# Patient Record
Sex: MALE | Race: WHITE | NOT HISPANIC OR LATINO | Employment: OTHER | URBAN - METROPOLITAN AREA
[De-identification: names, ages, dates, MRNs, and addresses within clinical notes are randomized per-mention and may not be internally consistent; named-entity substitution may affect disease eponyms.]

---

## 2023-03-06 ENCOUNTER — TELEPHONE (OUTPATIENT)
Dept: PRIMARY CARE | Facility: CLINIC | Age: 38
End: 2023-03-06
Payer: COMMERCIAL

## 2023-03-07 NOTE — TELEPHONE ENCOUNTER
Pt would like PCP to contact GI doctor. Per patient GI doctor states they will speak with PCP if they have further questions/issues because GI has not found anything.

## 2023-03-08 DIAGNOSIS — G89.29 CHRONIC ABDOMINAL PAIN: Primary | ICD-10-CM

## 2023-03-08 DIAGNOSIS — R10.9 CHRONIC ABDOMINAL PAIN: Primary | ICD-10-CM

## 2023-03-08 NOTE — TELEPHONE ENCOUNTER
Instructions per colonoscopy visit were to fu in office with them to discuss results and tx. I still discussed with Dr Fleming and she doesn't anticipate further GI work up and at this point feels it is muscular. Pain mngt consult would be next step.

## 2023-04-11 DIAGNOSIS — L65.9 ALOPECIA: Primary | ICD-10-CM

## 2023-04-11 RX ORDER — FINASTERIDE 5 MG/1
TABLET, FILM COATED ORAL
Qty: 30 TABLET | Refills: 3 | Status: SHIPPED | OUTPATIENT
Start: 2023-04-11 | End: 2024-04-05 | Stop reason: WASHOUT

## 2023-05-29 DIAGNOSIS — B00.1 RECURRENT COLD SORES: Primary | ICD-10-CM

## 2023-05-30 RX ORDER — VALACYCLOVIR HYDROCHLORIDE 1 G/1
TABLET, FILM COATED ORAL
Qty: 40 TABLET | Refills: 0 | Status: SHIPPED | OUTPATIENT
Start: 2023-05-30 | End: 2023-10-09 | Stop reason: SDUPTHER

## 2023-06-20 DIAGNOSIS — Z72.51 SEXUAL BEHAVIOR WITH HIGH RISK OF EXPOSURE TO COMMUNICABLE DISEASE: Primary | ICD-10-CM

## 2023-06-20 DIAGNOSIS — Z20.9 SEXUAL BEHAVIOR WITH HIGH RISK OF EXPOSURE TO COMMUNICABLE DISEASE: Primary | ICD-10-CM

## 2023-06-26 LAB
ALBUMIN (G/DL) IN SER/PLAS: 4.7 G/DL (ref 3.4–5)
ANION GAP IN SER/PLAS: 11 MMOL/L (ref 10–20)
CALCIUM (MG/DL) IN SER/PLAS: 9.6 MG/DL (ref 8.6–10.6)
CARBON DIOXIDE, TOTAL (MMOL/L) IN SER/PLAS: 30 MMOL/L (ref 21–32)
CHLORIDE (MMOL/L) IN SER/PLAS: 103 MMOL/L (ref 98–107)
CREATININE (MG/DL) IN SER/PLAS: 1.07 MG/DL (ref 0.5–1.3)
GFR MALE: >90 ML/MIN/1.73M2
GLUCOSE (MG/DL) IN SER/PLAS: 76 MG/DL (ref 74–99)
HIV 1/ 2 AG/AB SCREEN: NONREACTIVE
PHOSPHATE (MG/DL) IN SER/PLAS: 3.2 MG/DL (ref 2.5–4.9)
POTASSIUM (MMOL/L) IN SER/PLAS: 4.1 MMOL/L (ref 3.5–5.3)
SODIUM (MMOL/L) IN SER/PLAS: 140 MMOL/L (ref 136–145)
SYPHILIS TOTAL AB: NONREACTIVE
UREA NITROGEN (MG/DL) IN SER/PLAS: 19 MG/DL (ref 6–23)

## 2023-06-27 LAB
CHLAMYDIA TRACH., AMPLIFIED: NEGATIVE
N. GONORRHEA, AMPLIFIED: NEGATIVE

## 2023-07-12 DIAGNOSIS — K21.9 GASTROESOPHAGEAL REFLUX DISEASE, UNSPECIFIED WHETHER ESOPHAGITIS PRESENT: Primary | ICD-10-CM

## 2023-07-12 DIAGNOSIS — E78.5 DYSLIPIDEMIA: ICD-10-CM

## 2023-07-12 RX ORDER — ATORVASTATIN CALCIUM 40 MG/1
TABLET, FILM COATED ORAL
Qty: 90 TABLET | Refills: 0 | Status: SHIPPED | OUTPATIENT
Start: 2023-07-12 | End: 2023-10-09 | Stop reason: SDUPTHER

## 2023-07-12 RX ORDER — HYDROGEN PEROXIDE 3 %
SOLUTION, NON-ORAL MISCELLANEOUS
Qty: 90 CAPSULE | Refills: 0 | Status: SHIPPED | OUTPATIENT
Start: 2023-07-12 | End: 2023-10-09 | Stop reason: SDUPTHER

## 2023-08-03 PROBLEM — G89.29 CHRONIC HEADACHES: Status: ACTIVE | Noted: 2023-08-03

## 2023-08-03 PROBLEM — U07.1 COVID-19 VIRUS INFECTION: Status: ACTIVE | Noted: 2023-08-03

## 2023-08-03 PROBLEM — M54.50 LOWER BACK PAIN: Status: ACTIVE | Noted: 2023-08-03

## 2023-08-03 PROBLEM — K21.9 GERD (GASTROESOPHAGEAL REFLUX DISEASE): Status: ACTIVE | Noted: 2023-08-03

## 2023-08-03 PROBLEM — R51.9 CHRONIC HEADACHES: Status: ACTIVE | Noted: 2023-08-03

## 2023-08-03 PROBLEM — H69.93 DYSFUNCTION OF BOTH EUSTACHIAN TUBES: Status: ACTIVE | Noted: 2023-08-03

## 2023-08-03 PROBLEM — E78.6 LOW HDL (UNDER 40): Status: ACTIVE | Noted: 2023-08-03

## 2023-08-03 PROBLEM — R73.01 IFG (IMPAIRED FASTING GLUCOSE): Status: ACTIVE | Noted: 2023-08-03

## 2023-08-03 PROBLEM — L65.9 ALOPECIA: Status: ACTIVE | Noted: 2023-08-03

## 2023-08-03 PROBLEM — G44.209 TENSION HEADACHE: Status: ACTIVE | Noted: 2023-08-03

## 2023-08-03 PROBLEM — F31.9 BIPOLAR AFFECTIVE DISORDER (MULTI): Status: ACTIVE | Noted: 2023-08-03

## 2023-08-03 PROBLEM — Z77.21 PERSONAL HISTORY OF EXPOSURE TO POTENTIALLY HAZARDOUS BODY FLUIDS: Status: ACTIVE | Noted: 2023-08-03

## 2023-08-03 PROBLEM — R10.9 ABDOMINAL PAIN: Status: ACTIVE | Noted: 2023-08-03

## 2023-08-03 PROBLEM — R31.21 ASYMPTOMATIC MICROSCOPIC HEMATURIA: Status: ACTIVE | Noted: 2023-08-03

## 2023-08-03 PROBLEM — N50.89 LUMP IN SCROTUM: Status: ACTIVE | Noted: 2023-08-03

## 2023-08-03 PROBLEM — K27.9 PEPTIC ULCER: Status: ACTIVE | Noted: 2023-08-03

## 2023-08-03 PROBLEM — M77.12 LATERAL EPICONDYLITIS OF LEFT ELBOW: Status: ACTIVE | Noted: 2023-08-03

## 2023-08-03 PROBLEM — M79.18 MUSCULOSKELETAL PAIN: Status: ACTIVE | Noted: 2023-08-03

## 2023-08-03 PROBLEM — M77.10 TENNIS ELBOW: Status: ACTIVE | Noted: 2023-08-03

## 2023-08-03 PROBLEM — R10.31 RIGHT LOWER QUADRANT ABDOMINAL PAIN: Status: ACTIVE | Noted: 2023-08-03

## 2023-08-03 PROBLEM — G56.22 CUBITAL TUNNEL SYNDROME ON LEFT: Status: ACTIVE | Noted: 2023-08-03

## 2023-08-03 PROBLEM — R63.4 WEIGHT LOSS: Status: ACTIVE | Noted: 2023-08-03

## 2023-08-03 PROBLEM — Q78.2 BONY SCLEROSIS (HHS-HCC): Status: ACTIVE | Noted: 2023-08-03

## 2023-08-03 PROBLEM — D64.9 ANEMIA: Status: ACTIVE | Noted: 2023-08-03

## 2023-08-03 PROBLEM — F41.9 ANXIETY: Status: ACTIVE | Noted: 2023-08-03

## 2023-08-03 PROBLEM — E78.00 ELEVATED LDL CHOLESTEROL LEVEL: Status: ACTIVE | Noted: 2023-08-03

## 2023-08-03 RX ORDER — HYDROXYZINE HYDROCHLORIDE 25 MG/1
TABLET, FILM COATED ORAL
COMMUNITY
Start: 2022-09-30 | End: 2023-12-18 | Stop reason: WASHOUT

## 2023-08-03 RX ORDER — ALPRAZOLAM 0.5 MG/1
0.5 TABLET ORAL NIGHTLY
COMMUNITY

## 2023-08-03 RX ORDER — DULOXETIN HYDROCHLORIDE 60 MG/1
1 CAPSULE, DELAYED RELEASE ORAL DAILY
COMMUNITY
Start: 2018-07-11 | End: 2023-12-18 | Stop reason: WASHOUT

## 2023-08-03 RX ORDER — LURASIDONE HYDROCHLORIDE 20 MG/1
60 TABLET, FILM COATED ORAL
COMMUNITY
Start: 2017-01-18 | End: 2023-12-18 | Stop reason: WASHOUT

## 2023-08-03 RX ORDER — LAMOTRIGINE 200 MG/1
2 TABLET ORAL DAILY
COMMUNITY
Start: 2017-01-18

## 2023-08-03 RX ORDER — DOXYCYCLINE HYCLATE 100 MG
TABLET ORAL
COMMUNITY
End: 2023-11-10 | Stop reason: SDUPTHER

## 2023-08-03 RX ORDER — EMTRICITABINE AND TENOFOVIR ALAFENAMIDE 200; 25 MG/1; MG/1
1 TABLET ORAL DAILY
COMMUNITY
End: 2023-10-24

## 2023-08-08 LAB
ALANINE AMINOTRANSFERASE (SGPT) (U/L) IN SER/PLAS: 22 U/L (ref 10–52)
ALBUMIN (G/DL) IN SER/PLAS: 4.5 G/DL (ref 3.4–5)
ALKALINE PHOSPHATASE (U/L) IN SER/PLAS: 43 U/L (ref 33–120)
ANION GAP IN SER/PLAS: 11 MMOL/L (ref 10–20)
APPEARANCE, URINE: CLEAR
ASPARTATE AMINOTRANSFERASE (SGOT) (U/L) IN SER/PLAS: 20 U/L (ref 9–39)
BILIRUBIN TOTAL (MG/DL) IN SER/PLAS: 0.6 MG/DL (ref 0–1.2)
BILIRUBIN, URINE: NEGATIVE
BLOOD, URINE: ABNORMAL
CALCIUM (MG/DL) IN SER/PLAS: 9.8 MG/DL (ref 8.6–10.6)
CALCIUM OXALATE CRYSTALS, URINE: NORMAL /HPF
CARBON DIOXIDE, TOTAL (MMOL/L) IN SER/PLAS: 29 MMOL/L (ref 21–32)
CHLORIDE (MMOL/L) IN SER/PLAS: 105 MMOL/L (ref 98–107)
CHOLESTEROL (MG/DL) IN SER/PLAS: 132 MG/DL (ref 0–199)
CHOLESTEROL IN HDL (MG/DL) IN SER/PLAS: 46.8 MG/DL
CHOLESTEROL/HDL RATIO: 2.8
COLOR, URINE: YELLOW
CREATININE (MG/DL) IN SER/PLAS: 1.11 MG/DL (ref 0.5–1.3)
ERYTHROCYTE DISTRIBUTION WIDTH (RATIO) BY AUTOMATED COUNT: 11.9 % (ref 11.5–14.5)
ERYTHROCYTE MEAN CORPUSCULAR HEMOGLOBIN CONCENTRATION (G/DL) BY AUTOMATED: 33 G/DL (ref 32–36)
ERYTHROCYTE MEAN CORPUSCULAR VOLUME (FL) BY AUTOMATED COUNT: 95 FL (ref 80–100)
ERYTHROCYTES (10*6/UL) IN BLOOD BY AUTOMATED COUNT: 4.34 X10E12/L (ref 4.5–5.9)
GFR MALE: 88 ML/MIN/1.73M2
GLUCOSE (MG/DL) IN SER/PLAS: 96 MG/DL (ref 74–99)
GLUCOSE, URINE: NEGATIVE MG/DL
HEMATOCRIT (%) IN BLOOD BY AUTOMATED COUNT: 41.2 % (ref 41–52)
HEMOGLOBIN (G/DL) IN BLOOD: 13.6 G/DL (ref 13.5–17.5)
HEPATITIS B VIRUS SURFACE AB (MIU/ML) IN SERUM: 100.4 MIU/ML
HIV 1/ 2 AG/AB SCREEN: NONREACTIVE
KETONES, URINE: NEGATIVE MG/DL
LDL: 73 MG/DL (ref 0–99)
LEUKOCYTE ESTERASE, URINE: NEGATIVE
LEUKOCYTES (10*3/UL) IN BLOOD BY AUTOMATED COUNT: 5.3 X10E9/L (ref 4.4–11.3)
MUCUS, URINE: NORMAL /LPF
NITRITE, URINE: NEGATIVE
NRBC (PER 100 WBCS) BY AUTOMATED COUNT: 0 /100 WBC (ref 0–0)
PH, URINE: 5 (ref 5–8)
PLATELETS (10*3/UL) IN BLOOD AUTOMATED COUNT: 275 X10E9/L (ref 150–450)
POTASSIUM (MMOL/L) IN SER/PLAS: 4.3 MMOL/L (ref 3.5–5.3)
PROTEIN TOTAL: 7.3 G/DL (ref 6.4–8.2)
PROTEIN, URINE: NEGATIVE MG/DL
RBC, URINE: 2 /HPF (ref 0–5)
SODIUM (MMOL/L) IN SER/PLAS: 141 MMOL/L (ref 136–145)
SPECIFIC GRAVITY, URINE: 1.02 (ref 1–1.03)
SYPHILIS TOTAL AB: NONREACTIVE
THYROTROPIN (MIU/L) IN SER/PLAS BY DETECTION LIMIT <= 0.05 MIU/L: 1.19 MIU/L (ref 0.44–3.98)
TRIGLYCERIDE (MG/DL) IN SER/PLAS: 59 MG/DL (ref 0–149)
UREA NITROGEN (MG/DL) IN SER/PLAS: 15 MG/DL (ref 6–23)
UROBILINOGEN, URINE: <2 MG/DL (ref 0–1.9)
VLDL: 12 MG/DL (ref 0–40)
WBC, URINE: 2 /HPF (ref 0–5)

## 2023-08-09 DIAGNOSIS — A74.9 CHLAMYDIA: Primary | ICD-10-CM

## 2023-08-09 LAB
CHLAMYDIA TRACH., AMPLIFIED: POSITIVE
N. GONORRHEA, AMPLIFIED: NEGATIVE

## 2023-08-09 RX ORDER — DOXYCYCLINE 100 MG/1
100 CAPSULE ORAL 2 TIMES DAILY
Qty: 14 CAPSULE | Refills: 0 | Status: SHIPPED | OUTPATIENT
Start: 2023-08-09 | End: 2023-08-16

## 2023-08-10 ENCOUNTER — APPOINTMENT (OUTPATIENT)
Dept: PRIMARY CARE | Facility: CLINIC | Age: 38
End: 2023-08-10
Payer: COMMERCIAL

## 2023-08-28 DIAGNOSIS — B00.1 RECURRENT COLD SORES: ICD-10-CM

## 2023-09-27 RX ORDER — VALACYCLOVIR HYDROCHLORIDE 1 G/1
TABLET, FILM COATED ORAL
Qty: 40 TABLET | Refills: 3 | OUTPATIENT
Start: 2023-09-27

## 2023-10-09 ENCOUNTER — OFFICE VISIT (OUTPATIENT)
Dept: PRIMARY CARE | Facility: CLINIC | Age: 38
End: 2023-10-09
Payer: COMMERCIAL

## 2023-10-09 VITALS
HEART RATE: 76 BPM | RESPIRATION RATE: 12 BRPM | HEIGHT: 75 IN | WEIGHT: 199 LBS | BODY MASS INDEX: 24.74 KG/M2 | DIASTOLIC BLOOD PRESSURE: 76 MMHG | OXYGEN SATURATION: 97 % | SYSTOLIC BLOOD PRESSURE: 122 MMHG

## 2023-10-09 DIAGNOSIS — B00.1 RECURRENT COLD SORES: ICD-10-CM

## 2023-10-09 DIAGNOSIS — E78.5 DYSLIPIDEMIA: ICD-10-CM

## 2023-10-09 DIAGNOSIS — Z86.19 HISTORY OF CHLAMYDIA INFECTION: ICD-10-CM

## 2023-10-09 DIAGNOSIS — R35.0 URINARY FREQUENCY: ICD-10-CM

## 2023-10-09 DIAGNOSIS — E78.00 ELEVATED LDL CHOLESTEROL LEVEL: ICD-10-CM

## 2023-10-09 DIAGNOSIS — K21.9 GASTROESOPHAGEAL REFLUX DISEASE, UNSPECIFIED WHETHER ESOPHAGITIS PRESENT: ICD-10-CM

## 2023-10-09 DIAGNOSIS — D12.6 TUBULAR ADENOMA OF COLON: ICD-10-CM

## 2023-10-09 DIAGNOSIS — Z00.00 HEALTHCARE MAINTENANCE: Primary | ICD-10-CM

## 2023-10-09 PROBLEM — R73.01 IFG (IMPAIRED FASTING GLUCOSE): Status: RESOLVED | Noted: 2023-08-03 | Resolved: 2023-10-09

## 2023-10-09 PROCEDURE — 1036F TOBACCO NON-USER: CPT | Performed by: FAMILY MEDICINE

## 2023-10-09 PROCEDURE — 99395 PREV VISIT EST AGE 18-39: CPT | Performed by: FAMILY MEDICINE

## 2023-10-09 PROCEDURE — 90686 IIV4 VACC NO PRSV 0.5 ML IM: CPT | Performed by: FAMILY MEDICINE

## 2023-10-09 PROCEDURE — 90471 IMMUNIZATION ADMIN: CPT | Performed by: FAMILY MEDICINE

## 2023-10-09 RX ORDER — ATORVASTATIN CALCIUM 40 MG/1
40 TABLET, FILM COATED ORAL NIGHTLY
Qty: 90 TABLET | Refills: 1 | Status: SHIPPED | OUTPATIENT
Start: 2023-10-09 | End: 2024-06-04 | Stop reason: SDUPTHER

## 2023-10-09 RX ORDER — VALACYCLOVIR HYDROCHLORIDE 1 G/1
TABLET, FILM COATED ORAL
Qty: 40 TABLET | Refills: 1 | Status: SHIPPED | OUTPATIENT
Start: 2023-10-09

## 2023-10-09 RX ORDER — HYDROGEN PEROXIDE 3 %
20 SOLUTION, NON-ORAL MISCELLANEOUS
Qty: 90 CAPSULE | Refills: 1 | Status: SHIPPED | OUTPATIENT
Start: 2023-10-09 | End: 2023-12-18 | Stop reason: WASHOUT

## 2023-10-09 ASSESSMENT — ENCOUNTER SYMPTOMS
EYE REDNESS: 0
FATIGUE: 0
EYE PAIN: 0
CONSTIPATION: 0
DIFFICULTY URINATING: 0
COUGH: 0
HEADACHES: 0
ADENOPATHY: 0
DIARRHEA: 0
FREQUENCY: 1
BLOOD IN STOOL: 0
WEAKNESS: 0
BRUISES/BLEEDS EASILY: 0
SHORTNESS OF BREATH: 0
ARTHRALGIAS: 0
DYSURIA: 0
FEVER: 0
ABDOMINAL PAIN: 0
NERVOUS/ANXIOUS: 0
DIZZINESS: 0
CHILLS: 0
BACK PAIN: 0
CHEST TIGHTNESS: 0
DYSPHORIC MOOD: 0
APPETITE CHANGE: 0
SORE THROAT: 0
ABDOMINAL DISTENTION: 0

## 2023-10-09 NOTE — PROGRESS NOTES
"Subjective   Patient ID: Diony Marte is a 38 y.o. male who presents for Annual Exam.    HPI     Review of Systems    Objective   /76   Pulse 76   Resp 12   Ht 1.905 m (6' 3\")   Wt 90.3 kg (199 lb)   SpO2 97%   BMI 24.87 kg/m²     Physical Exam    Assessment/Plan          "

## 2023-10-09 NOTE — PROGRESS NOTES
"Subjective   Patient ID: Diony Marte is a 38 y.o. male who presents for Annual Exam.  PMHX, PSHx, Fam hx, and Social hx reviewed.   New concerns - over the past 2 yrs he's having worsening urinary frequency. He is urinating about 4-6x per hour. Drinks 2 cups of water per hour. Denies other  symptoms. 1 month ago was treated for Chlamydia, to recheck next month.  Vaccines Flu shot today  Dentist seen at least yearly yes  Vision concerns none  Hearing concerns none  Diet is  overall healthy.   Smoker - no  Alcohol use - 5 drinks/wee  Exercising 6 days per week.   Sexually active - yes, no issues  Colonoscopy NA           Review of Systems   Constitutional:  Negative for appetite change, chills, fatigue and fever.   HENT:  Negative for congestion, hearing loss and sore throat.    Eyes:  Negative for pain, redness and visual disturbance.   Respiratory:  Negative for cough, chest tightness and shortness of breath.    Cardiovascular:  Negative for chest pain and leg swelling.   Gastrointestinal:  Negative for abdominal distention, abdominal pain, blood in stool, constipation and diarrhea.   Genitourinary:  Positive for frequency. Negative for difficulty urinating and dysuria.   Musculoskeletal:  Negative for arthralgias and back pain.   Skin:  Negative for rash.   Neurological:  Negative for dizziness, weakness and headaches.   Hematological:  Negative for adenopathy. Does not bruise/bleed easily.   Psychiatric/Behavioral:  Negative for dysphoric mood. The patient is not nervous/anxious.        Objective   /76   Pulse 76   Resp 12   Ht 1.905 m (6' 3\")   Wt 90.3 kg (199 lb)   SpO2 97%   BMI 24.87 kg/m²    Physical Exam  Constitutional:       General: He is not in acute distress.     Appearance: Normal appearance. He is not ill-appearing.   HENT:      Head: Normocephalic and atraumatic.      Right Ear: Tympanic membrane, ear canal and external ear normal.      Left Ear: Tympanic membrane, ear canal " and external ear normal.      Nose: Nose normal.      Mouth/Throat:      Mouth: Mucous membranes are moist.      Pharynx: No oropharyngeal exudate or posterior oropharyngeal erythema.   Eyes:      Extraocular Movements: Extraocular movements intact.      Conjunctiva/sclera: Conjunctivae normal.      Pupils: Pupils are equal, round, and reactive to light.   Neck:      Vascular: No carotid bruit.   Cardiovascular:      Rate and Rhythm: Normal rate and regular rhythm.      Heart sounds: Normal heart sounds. No murmur heard.  Pulmonary:      Breath sounds: Normal breath sounds. No wheezing, rhonchi or rales.   Abdominal:      General: Bowel sounds are normal. There is no distension.      Palpations: Abdomen is soft. There is no mass.      Tenderness: There is no abdominal tenderness.   Musculoskeletal:         General: No swelling or deformity.      Cervical back: Neck supple. No tenderness.   Lymphadenopathy:      Cervical: No cervical adenopathy.   Skin:     General: Skin is warm and dry.      Findings: No lesion or rash.   Neurological:      Mental Status: He is alert and oriented to person, place, and time.      Sensory: No sensory deficit.      Motor: No weakness.      Coordination: Coordination normal.      Deep Tendon Reflexes: Reflexes normal.   Psychiatric:         Mood and Affect: Mood normal.         Behavior: Behavior normal.         Judgment: Judgment normal.           Assessment/Plan   Diagnoses and all orders for this visit:  Healthcare maintenance - Flu shot given today. Labs reviewed and discussed. Colonoscopy current, due again in 5yrs.   Urinary frequency - chronic, worsening. Referring to Urology. Recheck urine for chlamydia in 1month.   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   Follow up in 6 months, 15min

## 2023-10-22 DIAGNOSIS — Z77.21 PERSONAL HISTORY OF EXPOSURE TO POTENTIALLY HAZARDOUS BODY FLUIDS: Primary | ICD-10-CM

## 2023-10-23 ENCOUNTER — CLINICAL SUPPORT (OUTPATIENT)
Dept: IMMUNOLOGY | Facility: CLINIC | Age: 38
End: 2023-10-23
Payer: COMMERCIAL

## 2023-10-23 DIAGNOSIS — Z77.21 PERSONAL HISTORY OF EXPOSURE TO POTENTIALLY HAZARDOUS BODY FLUIDS: ICD-10-CM

## 2023-10-23 DIAGNOSIS — Z86.19 HISTORY OF CHLAMYDIA INFECTION: ICD-10-CM

## 2023-10-23 LAB
ALBUMIN SERPL BCP-MCNC: 4.7 G/DL (ref 3.4–5)
ANION GAP SERPL CALC-SCNC: 13 MMOL/L (ref 10–20)
BUN SERPL-MCNC: 14 MG/DL (ref 6–23)
CALCIUM SERPL-MCNC: 9.6 MG/DL (ref 8.6–10.6)
CHLORIDE SERPL-SCNC: 101 MMOL/L (ref 98–107)
CO2 SERPL-SCNC: 29 MMOL/L (ref 21–32)
CREAT SERPL-MCNC: 1.14 MG/DL (ref 0.5–1.3)
GFR SERPL CREATININE-BSD FRML MDRD: 85 ML/MIN/1.73M*2
GLUCOSE SERPL-MCNC: 76 MG/DL (ref 74–99)
HIV 1+2 AB+HIV1 P24 AG SERPL QL IA: NONREACTIVE
PHOSPHATE SERPL-MCNC: 3.2 MG/DL (ref 2.5–4.9)
POTASSIUM SERPL-SCNC: 3.6 MMOL/L (ref 3.5–5.3)
SODIUM SERPL-SCNC: 139 MMOL/L (ref 136–145)
T PALLIDUM AB SER QL: NONREACTIVE

## 2023-10-23 PROCEDURE — 87800 DETECT AGNT MULT DNA DIREC: CPT

## 2023-10-23 PROCEDURE — 87389 HIV-1 AG W/HIV-1&-2 AB AG IA: CPT | Mod: CMCLAB

## 2023-10-23 PROCEDURE — 80069 RENAL FUNCTION PANEL: CPT

## 2023-10-23 PROCEDURE — 36415 COLL VENOUS BLD VENIPUNCTURE: CPT

## 2023-10-23 PROCEDURE — 86780 TREPONEMA PALLIDUM: CPT

## 2023-10-24 LAB
C TRACH RRNA SPEC QL NAA+PROBE: NEGATIVE
N GONORRHOEA DNA SPEC QL PROBE+SIG AMP: NEGATIVE

## 2023-10-24 RX ORDER — EMTRICITABINE AND TENOFOVIR ALAFENAMIDE 200; 25 MG/1; MG/1
1 TABLET ORAL DAILY
Qty: 90 TABLET | Refills: 3 | Status: SHIPPED | OUTPATIENT
Start: 2023-10-24 | End: 2023-12-19 | Stop reason: SDUPTHER

## 2023-10-25 ENCOUNTER — TELEPHONE (OUTPATIENT)
Dept: PRIMARY CARE | Facility: CLINIC | Age: 38
End: 2023-10-25
Payer: COMMERCIAL

## 2023-10-25 DIAGNOSIS — A74.9 CHLAMYDIA: ICD-10-CM

## 2023-10-25 NOTE — TELEPHONE ENCOUNTER
----- Message from Hay Robledo MD sent at 10/24/2023 12:53 PM EDT -----  STD panel is negative.  ----- Message -----  From: Lab, Background User  Sent: 10/23/2023   7:44 PM EDT  To: Hay Robledo MD

## 2023-11-07 RX ORDER — DOXYCYCLINE 100 MG/1
CAPSULE ORAL
Qty: 14 CAPSULE | Refills: 51 | OUTPATIENT
Start: 2023-11-07

## 2023-11-10 DIAGNOSIS — Z77.21 PERSONAL HISTORY OF EXPOSURE TO POTENTIALLY HAZARDOUS BODY FLUIDS: ICD-10-CM

## 2023-11-10 RX ORDER — DOXYCYCLINE HYCLATE 100 MG
TABLET ORAL
Qty: 14 TABLET | Refills: 0 | Status: SHIPPED | OUTPATIENT
Start: 2023-11-10 | End: 2023-12-18 | Stop reason: ALTCHOICE

## 2023-12-14 ENCOUNTER — OFFICE VISIT (OUTPATIENT)
Dept: UROLOGY | Facility: HOSPITAL | Age: 38
End: 2023-12-14
Payer: COMMERCIAL

## 2023-12-14 DIAGNOSIS — R35.0 URINARY FREQUENCY: Primary | ICD-10-CM

## 2023-12-14 LAB
POC APPEARANCE, URINE: CLEAR
POC BILIRUBIN, URINE: NEGATIVE
POC BLOOD, URINE: NEGATIVE
POC COLOR, URINE: YELLOW
POC GLUCOSE, URINE: NEGATIVE MG/DL
POC KETONES, URINE: NEGATIVE MG/DL
POC LEUKOCYTES, URINE: NEGATIVE
POC NITRITE,URINE: NEGATIVE
POC PH, URINE: 6 PH
POC PROTEIN, URINE: NEGATIVE MG/DL
POC SPECIFIC GRAVITY, URINE: >=1.03
POC UROBILINOGEN, URINE: 0.2 EU/DL

## 2023-12-14 PROCEDURE — 99204 OFFICE O/P NEW MOD 45 MIN: CPT | Performed by: STUDENT IN AN ORGANIZED HEALTH CARE EDUCATION/TRAINING PROGRAM

## 2023-12-14 PROCEDURE — 1036F TOBACCO NON-USER: CPT | Performed by: STUDENT IN AN ORGANIZED HEALTH CARE EDUCATION/TRAINING PROGRAM

## 2023-12-14 PROCEDURE — 81003 URINALYSIS AUTO W/O SCOPE: CPT | Performed by: STUDENT IN AN ORGANIZED HEALTH CARE EDUCATION/TRAINING PROGRAM

## 2023-12-14 PROCEDURE — 99214 OFFICE O/P EST MOD 30 MIN: CPT | Performed by: STUDENT IN AN ORGANIZED HEALTH CARE EDUCATION/TRAINING PROGRAM

## 2023-12-14 RX ORDER — OXYBUTYNIN CHLORIDE 5 MG/1
5 TABLET ORAL 2 TIMES DAILY
Qty: 60 TABLET | Refills: 5 | Status: SHIPPED | OUTPATIENT
Start: 2023-12-14 | End: 2024-06-11

## 2023-12-14 NOTE — PROGRESS NOTES
Subjective   Diony Marte is a 38 y.o. who presents today for evaluation of lower urinary tract symptoms. The patient reports frequency. He denies incomplete emptying. The patient states symptoms are of moderate severity. Onset of symptoms was several months ago and was gradual in onset. His AUA Symptom Score is 12/35 manifested as irritative symptoms including frequency. He has no personal history of prostate cancer. He reports a history of no complicating symptoms. He denies recent STI.    Objective   There were no vitals taken for this visit.  Physical Exam  Constitutional:       Appearance: Normal appearance.   HENT:      Head: Normocephalic and atraumatic.      Nose: Nose normal.   Pulmonary:      Effort: No respiratory distress.   Abdominal:      General: There is no distension.   Neurological:      Mental Status: He is alert.       Review of Systems   All other systems reviewed and are negative.       Assessment/Plan     Urinary frequency     38 year-old very pleasant gentleman presenting with the above condition. We had a very long and extensive discussion with the patient regarding the pathophysiology, differential diagnosis, risk factor, management, natural history, incidence and diagnostic work-up of the condition.     Today, we had a very long and extensive discussion with the patient regarding the pathophysiology, differential diagnosis of frequency and lower urinary tract symptoms and acute urinary retention. We discussed at length the mechanism of action, risk, benefit, adverse events and side effect of alpha-blocker in the form of tamsulosin 0.4 mg p.o nightly. We discussed in particular the risk of hypotension, lightheadedness, dizziness, and the risk of fall and bone fracture. Also discussed retrograde ejaculation of the side effects of the medication. We had another discussion with the patient regarding lifestyle modifications including low fluid intake after 5 PM, timed voiding every 2  hours, and decrease caffeine intake.     Plan  Follow up in 1 month  Urine DNA test  Cysto  Renal US          Diagnoses and all orders for this visit:  Urinary frequency  -     POCT UA Automated manually resulted  -     Cystoscopy; Future  -     US renal complete; Future  -     oxybutynin (Ditropan) 5 mg tablet; Take 1 tablet (5 mg) by mouth 2 times a day.

## 2023-12-15 DIAGNOSIS — Z77.21 PERSONAL HISTORY OF EXPOSURE TO POTENTIALLY HAZARDOUS BODY FLUIDS: ICD-10-CM

## 2023-12-18 ENCOUNTER — CLINICAL SUPPORT (OUTPATIENT)
Dept: IMMUNOLOGY | Facility: CLINIC | Age: 38
End: 2023-12-18
Payer: COMMERCIAL

## 2023-12-18 ENCOUNTER — TELEMEDICINE (OUTPATIENT)
Dept: IMMUNOLOGY | Facility: CLINIC | Age: 38
End: 2023-12-18
Payer: COMMERCIAL

## 2023-12-18 DIAGNOSIS — Z77.21 PERSONAL HISTORY OF EXPOSURE TO POTENTIALLY HAZARDOUS BODY FLUIDS: ICD-10-CM

## 2023-12-18 DIAGNOSIS — Z77.21 PERSONAL HISTORY OF EXPOSURE TO POTENTIALLY HAZARDOUS BODY FLUIDS: Primary | ICD-10-CM

## 2023-12-18 LAB
ALBUMIN SERPL BCP-MCNC: 4.8 G/DL (ref 3.4–5)
ANION GAP SERPL CALC-SCNC: 13 MMOL/L (ref 10–20)
BUN SERPL-MCNC: 12 MG/DL (ref 6–23)
CALCIUM SERPL-MCNC: 9.4 MG/DL (ref 8.6–10.6)
CHLORIDE SERPL-SCNC: 103 MMOL/L (ref 98–107)
CO2 SERPL-SCNC: 29 MMOL/L (ref 21–32)
CREAT SERPL-MCNC: 1.21 MG/DL (ref 0.5–1.3)
GFR SERPL CREATININE-BSD FRML MDRD: 80 ML/MIN/1.73M*2
GLUCOSE SERPL-MCNC: 91 MG/DL (ref 74–99)
HIV 1+2 AB+HIV1 P24 AG SERPL QL IA: NONREACTIVE
PHOSPHATE SERPL-MCNC: 3.4 MG/DL (ref 2.5–4.9)
POTASSIUM SERPL-SCNC: 4 MMOL/L (ref 3.5–5.3)
SODIUM SERPL-SCNC: 141 MMOL/L (ref 136–145)
T PALLIDUM AB SER QL: NONREACTIVE

## 2023-12-18 PROCEDURE — 80069 RENAL FUNCTION PANEL: CPT

## 2023-12-18 PROCEDURE — 99212 OFFICE O/P EST SF 10 MIN: CPT | Performed by: NURSE PRACTITIONER

## 2023-12-18 PROCEDURE — 87800 DETECT AGNT MULT DNA DIREC: CPT

## 2023-12-18 PROCEDURE — 87389 HIV-1 AG W/HIV-1&-2 AB AG IA: CPT

## 2023-12-18 PROCEDURE — 36415 COLL VENOUS BLD VENIPUNCTURE: CPT

## 2023-12-18 PROCEDURE — 86780 TREPONEMA PALLIDUM: CPT

## 2023-12-18 RX ORDER — FINASTERIDE 5 MG/1
TABLET, FILM COATED ORAL
COMMUNITY
Start: 2021-01-26 | End: 2024-04-05 | Stop reason: SDUPTHER

## 2023-12-18 RX ORDER — HYDROGEN PEROXIDE 3 %
SOLUTION, NON-ORAL MISCELLANEOUS
COMMUNITY
Start: 2022-07-22 | End: 2023-12-18 | Stop reason: WASHOUT

## 2023-12-18 RX ORDER — LURASIDONE HYDROCHLORIDE 60 MG/1
TABLET, FILM COATED ORAL
COMMUNITY
Start: 2023-10-27

## 2023-12-18 RX ORDER — ATOMOXETINE 40 MG/1
CAPSULE ORAL
COMMUNITY
Start: 2012-09-08

## 2023-12-18 ASSESSMENT — ENCOUNTER SYMPTOMS
GASTROINTESTINAL NEGATIVE: 1
ALLERGIC/IMMUNOLOGIC NEGATIVE: 1
CONSTITUTIONAL NEGATIVE: 1
EYES NEGATIVE: 1
RESPIRATORY NEGATIVE: 1
CARDIOVASCULAR NEGATIVE: 1
ENDOCRINE NEGATIVE: 1
NEUROLOGICAL NEGATIVE: 1
HEMATOLOGIC/LYMPHATIC NEGATIVE: 1
MUSCULOSKELETAL NEGATIVE: 1

## 2023-12-18 NOTE — PROGRESS NOTES
HIV PrEP Clinic Follow-up Visit:    Diony Marte is a 38 y.o. male doing a telephone visit for PrEP for prevention of HIV infection.   His work scheduled changed and he was unable to come in.     Current PrEP therapy:  Descovy    Risk factors for HIV infection include: (select all that apply):  MSM    Regular condom use? Sometimes  Received treatment for STD since last seen? 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    Recurrent cold sores    Low back pain, unspecified 12/17/2018    Lumbar pain    Otalgia, bilateral 04/10/2021    Otalgia of both ears    Other specified disorders of nose and nasal sinuses 10/12/2020    Tenderness over frontal sinus    Otitis media, unspecified, bilateral 03/11/2021    BOM (bilateral otitis media)    Otitis media, unspecified, right ear 03/07/2017    Acute otitis media, right    Pain in right knee 03/11/2016    Acute pain of right knee    Personal history of other diseases of the musculoskeletal system and connective tissue 11/30/2020    History of muscle pain    Personal history of other diseases of the musculoskeletal system and connective tissue 11/06/2014    History of neck pain    Personal history of other diseases of the musculoskeletal system and connective tissue     History of radiculopathy    Personal history of other diseases of the respiratory system 04/10/2021    History of sore throat    Personal history of other diseases of the respiratory system 03/26/2018    History of acute sinusitis    Personal history of other diseases of the respiratory system 03/22/2018     History of sore throat    Personal history of other diseases of the respiratory system 04/03/2020    History of paranasal sinus congestion    Personal history of other diseases of the respiratory system     Personal history of asthma    Personal history of other endocrine, nutritional and metabolic disease 11/26/2019    History of hyperlipidemia    Personal history of other infectious and parasitic diseases 03/26/2018    History of viral infection    Personal history of other mental and behavioral disorders 11/20/2019    History of depression    Personal history of other specified conditions 11/30/2020    History of diarrhea    Personal history of other specified conditions 04/09/2021    History of dizziness    Personal history of peptic ulcer disease 02/27/2019    History of gastric ulcer    Personal history of peptic ulcer disease     Personal history of gastric ulcer    Unspecified otitis externa, right ear 03/22/2018    Right otitis externa       Allergies  Allergies   Allergen Reactions    Aripiprazole Unknown    Midazolam Hallucinations    Amoxicillin Rash    Celecoxib Rash       Current Medications:    Current Outpatient Medications:     atomoxetine (Strattera) 40 mg capsule, , Disp: , Rfl:     finasteride (Proscar) 5 mg tablet, Take by mouth., Disp: , Rfl:     lurasidone (Latuda) 60 mg tablet, , Disp: , Rfl:     ALPRAZolam (Xanax) 0.5 mg tablet, Take 1 tablet (0.5 mg) by mouth once daily at bedtime., Disp: , Rfl:     atorvastatin (Lipitor) 40 mg tablet, Take 1 tablet (40 mg) by mouth once daily at bedtime., Disp: 90 tablet, Rfl: 1    Descovy 200-25 mg tablet, TAKE 1 TABLET DAILY, Disp: 90 tablet, Rfl: 3    finasteride (Proscar) 5 mg tablet, TAKE ONE-FOURTH (1/4) TABLET DAILY FOR ALOPECIA, Disp: 30 tablet, Rfl: 3    lamoTRIgine (LaMICtal) 200 mg tablet, Take 2 tablets (400 mg) by mouth once daily., Disp: , Rfl:     oxybutynin (Ditropan) 5 mg tablet, Take 1 tablet (5 mg) by mouth 2 times a day., Disp: 60  tablet, Rfl: 5    valACYclovir (Valtrex) 1 gram tablet, TAKE 2 TABLETS EVERY 12 HOURS FOR 1 DAY AS NEEDED AT EARLY ONSET OF COLD SORES, Disp: 40 tablet, Rfl: 1    Immunizations:  Immunization History   Administered Date(s) Administered    Flu vaccine (IIV4), preservative free *Check age/dose* 09/30/2022, 10/09/2023    HPV 9-valent vaccine (GARDASIL 9) 12/14/2021, 01/21/2022, 07/22/2022    Hep A, Unspecified 10/26/2015    Hepatitis A vaccine, age 19 years and greater (HAVRIX) 12/13/2018    Hepatitis B vaccine, adult (RECOMBIVAX, ENGERIX) 01/21/2022, 05/05/2022, 07/22/2022    Influenza, Unspecified 10/19/2015, 09/12/2020    MMR vaccine, subcutaneous (MMR II) 07/23/2021    Pfizer Gray Cap SARS-CoV-2 04/28/2022    Pfizer Purple Cap SARS-CoV-2 03/12/2021, 04/02/2021, 12/01/2021    Smallpox Monkeypox, Live Attenuated, Preservative Free 08/10/2022    Tdap vaccine, age 7 year and older (BOOSTRIX) 10/26/2015, 01/21/2022    Vaccinia (smallpox) 09/07/2022       Review of systems  Review of Systems   Constitutional: Negative.    HENT: Negative.     Eyes: Negative.    Respiratory: Negative.     Cardiovascular: Negative.    Gastrointestinal: Negative.    Endocrine: Negative.    Genitourinary: Negative.    Musculoskeletal: Negative.    Skin: Negative.    Allergic/Immunologic: Negative.    Neurological: Negative.    Hematological: Negative.    Psychiatric/Behavioral:          Mood maintained on current medications.          PHYSICAL EXAMINATION:  Visit Vitals  Smoking Status Never       Physical Exam   Physical Exam  No physical  exam done as this was a telephone visit.       Pertinent data review:  HIV 1/2 Antigen/Antibody Screen with Reflex to Confirmation   Date Value Ref Range Status   10/23/2023 Nonreactive Nonreactive Final     Syphilis Total Ab   Date Value Ref Range Status   10/23/2023 Nonreactive Nonreactive Final     Comment:     No significant level of Treponema pallidum antibody detected.   Repeat testing in 2 to 4  "weeks may be considered if early   infection or incubating syphilis infection is suspected.     No results found for: \"VDRLCSF\"  No results found for: \"RPR\"  Hepatitis C Ab   Date Value Ref Range Status   02/22/2019 NON-REACTIVE NONREACTIVE Final     Comment:      Patients receiving more than 5 mg/day of biotin may have interference   in test results.  A sample should be taken no sooner than eight hours   after  previous dose. Contact [phone] for additional information.        Hepatitis B Surface Ag   Date Value Ref Range Status   02/22/2019 NONREACTIVE NONREACTIVE Final     Comment:      Patients receiving more than 5 mg/day of biotin may have interference   in test results.  A sample should be taken no sooner than eight hours   after  previous dose. Contact [phone] for additional information.        Hepatitis B Surface Ab   Date Value Ref Range Status   08/08/2023 100.4 <10 mIU/mL Final     Comment:     INTERPRETIVE CRITERIA:  <10 mIU/mL....NONREACTIVE    >=10 mIU/mL...REACTIVE   .   Biotin interference may cause falsely decreased results.   Patients taking a Biotin dose of up to 5 mg/day should   refrain from taking Biotin for 24 hours before sample   collection. Providers may contact their local laboratory   for further information.       No results found for: \"HAVTO\"  Glucose   Date Value Ref Range Status   10/23/2023 76 74 - 99 mg/dL Final     Sodium   Date Value Ref Range Status   10/23/2023 139 136 - 145 mmol/L Final     Potassium   Date Value Ref Range Status   10/23/2023 3.6 3.5 - 5.3 mmol/L Final     Chloride   Date Value Ref Range Status   10/23/2023 101 98 - 107 mmol/L Final     Anion Gap   Date Value Ref Range Status   10/23/2023 13 10 - 20 mmol/L Final     Urea Nitrogen   Date Value Ref Range Status   10/23/2023 14 6 - 23 mg/dL Final     Creatinine   Date Value Ref Range Status   10/23/2023 1.14 0.50 - 1.30 mg/dL Final     eGFR   Date Value Ref Range Status   10/23/2023 85 >60 mL/min/1.73m*2 " Final     Comment:     Calculations of estimated GFR are performed using the 2021 CKD-EPI Study Refit equation without the race variable for the IDMS-Traceable creatinine methods.  https://jasn.asnjournals.org/content/early/2021/09/22/ASN.2379147206     Calcium   Date Value Ref Range Status   10/23/2023 9.6 8.6 - 10.6 mg/dL Final     Phosphorus   Date Value Ref Range Status   10/23/2023 3.2 2.5 - 4.9 mg/dL Final     Comment:     The performance characteristics of phosphorus testing in heparinized plasma have been validated by the individual  laboratory site where testing is performed. Testing on heparinized plasma is not approved by the FDA; however, such approval is not necessary.     Albumin   Date Value Ref Range Status   10/23/2023 4.7 3.4 - 5.0 g/dL Final       Assessment and Plan:  Diony Marte is a 38 y.o.male who had a telephone visit today for evaluation of appropriateness for continuation of  PrEP therapy as they continue to be at greater risk for acquiring HIV infection.  HIV Ag/Ab, Syphilis testing, and renal function will be collected today.  GC NAAT testing will be obtained from 2 sites:  throat, rectum.  If HIV testing negative, prescription for PrEP will be renewed.    Patient plans to come in today for blood work and swabs.   Will refill descovy and doxy prep once those labs are reviewed.  Labs again in 3 months and face to face in 6 months.   Problem List Items Addressed This Visit    None  It was good talking with you today.   Thank you for coming in for lab work later today and refills will be sent once those results are known.    Have good holidays.     Bety Jain, APRN-CNP

## 2023-12-19 DIAGNOSIS — R35.0 URINARY FREQUENCY: Primary | ICD-10-CM

## 2023-12-19 DIAGNOSIS — Z77.21 PERSONAL HISTORY OF EXPOSURE TO POTENTIALLY HAZARDOUS BODY FLUIDS: ICD-10-CM

## 2023-12-19 LAB
C TRACH RRNA SPEC QL NAA+PROBE: NEGATIVE
N GONORRHOEA DNA SPEC QL PROBE+SIG AMP: NEGATIVE

## 2023-12-19 RX ORDER — EMTRICITABINE AND TENOFOVIR ALAFENAMIDE 200; 25 MG/1; MG/1
1 TABLET ORAL DAILY
Qty: 90 TABLET | Refills: 0 | Status: SHIPPED | OUTPATIENT
Start: 2023-12-19 | End: 2024-01-03 | Stop reason: SDUPTHER

## 2023-12-19 RX ORDER — DOXYCYCLINE HYCLATE 100 MG
TABLET ORAL
Qty: 30 TABLET | Refills: 0 | Status: SHIPPED | OUTPATIENT
Start: 2023-12-19 | End: 2024-01-03 | Stop reason: SDUPTHER

## 2023-12-19 RX ORDER — VIBEGRON 75 MG/1
75 TABLET, FILM COATED ORAL DAILY
Qty: 30 TABLET | Refills: 0 | Status: SHIPPED | OUTPATIENT
Start: 2023-12-19 | End: 2024-01-22 | Stop reason: SDUPTHER

## 2023-12-26 ENCOUNTER — HOSPITAL ENCOUNTER (OUTPATIENT)
Dept: RADIOLOGY | Facility: HOSPITAL | Age: 38
Discharge: HOME | End: 2023-12-26
Payer: COMMERCIAL

## 2023-12-26 DIAGNOSIS — R35.0 URINARY FREQUENCY: ICD-10-CM

## 2023-12-26 PROCEDURE — 76770 US EXAM ABDO BACK WALL COMP: CPT | Performed by: RADIOLOGY

## 2023-12-26 PROCEDURE — 76770 US EXAM ABDO BACK WALL COMP: CPT

## 2024-01-03 DIAGNOSIS — Z77.21 PERSONAL HISTORY OF EXPOSURE TO POTENTIALLY HAZARDOUS BODY FLUIDS: ICD-10-CM

## 2024-01-03 RX ORDER — DOXYCYCLINE HYCLATE 100 MG
TABLET ORAL
Qty: 30 TABLET | Refills: 0 | Status: SHIPPED | OUTPATIENT
Start: 2024-01-03

## 2024-01-03 RX ORDER — EMTRICITABINE AND TENOFOVIR ALAFENAMIDE 200; 25 MG/1; MG/1
1 TABLET ORAL DAILY
Qty: 90 TABLET | Refills: 0 | Status: SHIPPED | OUTPATIENT
Start: 2024-01-03 | End: 2024-01-08 | Stop reason: SDUPTHER

## 2024-01-08 DIAGNOSIS — Z77.21 PERSONAL HISTORY OF EXPOSURE TO POTENTIALLY HAZARDOUS BODY FLUIDS: ICD-10-CM

## 2024-01-08 RX ORDER — EMTRICITABINE AND TENOFOVIR ALAFENAMIDE 200; 25 MG/1; MG/1
1 TABLET ORAL DAILY
Qty: 90 TABLET | Refills: 0 | Status: SHIPPED | OUTPATIENT
Start: 2024-01-08 | End: 2024-04-07

## 2024-01-10 ENCOUNTER — PROCEDURE VISIT (OUTPATIENT)
Dept: UROLOGY | Facility: HOSPITAL | Age: 39
End: 2024-01-10
Payer: COMMERCIAL

## 2024-01-10 VITALS — SYSTOLIC BLOOD PRESSURE: 111 MMHG | DIASTOLIC BLOOD PRESSURE: 70 MMHG | HEART RATE: 78 BPM

## 2024-01-10 DIAGNOSIS — R35.0 URINARY FREQUENCY: Primary | ICD-10-CM

## 2024-01-10 DIAGNOSIS — Z79.2 PROPHYLACTIC ANTIBIOTIC: ICD-10-CM

## 2024-01-10 LAB
POC APPEARANCE, URINE: CLEAR
POC BILIRUBIN, URINE: NEGATIVE
POC BLOOD, URINE: ABNORMAL
POC COLOR, URINE: YELLOW
POC GLUCOSE, URINE: NEGATIVE MG/DL
POC KETONES, URINE: NEGATIVE MG/DL
POC LEUKOCYTES, URINE: NEGATIVE
POC NITRITE,URINE: NEGATIVE
POC PH, URINE: 6 PH
POC PROTEIN, URINE: NEGATIVE MG/DL
POC SPECIFIC GRAVITY, URINE: 1.02
POC UROBILINOGEN, URINE: 0.2 EU/DL

## 2024-01-10 PROCEDURE — 81003 URINALYSIS AUTO W/O SCOPE: CPT | Performed by: STUDENT IN AN ORGANIZED HEALTH CARE EDUCATION/TRAINING PROGRAM

## 2024-01-10 PROCEDURE — 52000 CYSTOURETHROSCOPY: CPT | Performed by: STUDENT IN AN ORGANIZED HEALTH CARE EDUCATION/TRAINING PROGRAM

## 2024-01-10 PROCEDURE — 99213 OFFICE O/P EST LOW 20 MIN: CPT | Performed by: STUDENT IN AN ORGANIZED HEALTH CARE EDUCATION/TRAINING PROGRAM

## 2024-01-10 RX ORDER — CIPROFLOXACIN 500 MG/1
500 TABLET ORAL ONCE
Status: SHIPPED | OUTPATIENT
Start: 2024-01-10

## 2024-01-10 ASSESSMENT — PAIN SCALES - GENERAL: PAINLEVEL: 0-NO PAIN

## 2024-01-10 NOTE — PROGRESS NOTES
"Subjective   Diony Marte \"Iglesia" is a 38 y.o. who presents today for evaluation of lower urinary tract symptoms. The patient reports frequency. He denies incomplete emptying. The patient states symptoms are of moderate severity. Onset of symptoms was several months ago and was gradual in onset. His AUA Symptom Score is 12/35 manifested as irritative symptoms including frequency.     Objective     Physical Exam  Constitutional:       Appearance: Normal appearance.   HENT:      Head: Normocephalic and atraumatic.      Nose: Nose normal.   Pulmonary:      Effort: No respiratory distress.   Abdominal:      General: There is no distension.   Neurological:      Mental Status: He is alert.       Review of Systems   All other systems reviewed and are negative.        Cystoscopy    Date/Time: 1/10/2024 9:06 AM    Performed by: Celio Pierce MD MPH  Authorized by: Celio Pierce MD MPH    Procedure - Bladder Cystoscopy:     Procedure details: cystoscopy    Post-procedure:     Patient tolerance: Patient tolerated the procedure well with no immediate complications      Comments:        PREOPERATIVE DIAGNOSIS:  LUTs     POSTOPERATIVE DIAGNOSIS:  Same     OPERATION:  Flexible Cystourethroscopy        SURGEON:  Celio Pierce MD MPH     ANESTHESIA:  2%  lidocaine jelly     COMPLICATIONS:  None     EBL: Minimal           DISPOSITION:  The patient was discharged home after the procedure, per routine.     INDICATIONS: :    Diony Marte \"Iglesia" is a 38 y.o. who presents today for evaluation of lower urinary tract symptoms.   The indications, risks and benefits of this procedure were discussed with the patient, consent was obtained prior to the procedure, and to the best of my judgement the patient seemed to understand and agree to the procedure.     PROCEDURE:  The patient  was brought into the procedure suite and informed consent was reviewed and confirmed. Vital signs were obtained prior to the procedure: " There were no vitals taken for this visit..  The patient was escorted onto the stretcher, placed supine, prepped with betadine and draped in the usual standard surgical fashion.  Intraurethral 2% viscous lidocaine jelly was used for local analgesia.  A 16 Welsh flexible cystourethroscope was inserted into the urethra.   The penile urethra was normal. NO urethral stricture.   The prostate urethra was normal.  Upon entering the bladder the entire bladder was surveyed in a 360 degree fashion.  The left and right ureteral orifices were in normal orthotopic position effluxing clear yellow urine, bilaterally.   There was no evidence of any bladder lesions, foreign objects, stones or evidence of any mucosal changes. The cystoscope was then retroflexed.  The bladder neck was then further examined without any evidence of lesions. The scope was then removed and in an antegrade fashion, the urethra and bladder were again resurveyed with no evidence of additional lesions.  The cystoscope was then fully removed.   The patient tolerated the procedure well.  Vitals were stable after the procedure.  The patient was able to void and was discharged home.  Verbal and written Post procedure instructions were reviewed with the patient.     IMPRESSION:  Normal Cysto                 Assessment/Plan     Frequency of urine    38 year-old very pleasant gentleman presenting with the above condition. We had a very long and extensive discussion with the patient regarding the pathophysiology, differential diagnosis, risk factor, management, natural history, incidence and diagnostic work-up of the condition.      Again today Today, we had a very long and extensive discussion with the patient regarding the pathophysiology, differential diagnosis of frequency and lower urinary tract symptoms and acute urinary retention. We discussed at length the mechanism of action, risk, benefit, adverse events and side effect of Ditropan 5mg PO BID.  We had  another discussion with the patient regarding lifestyle modifications including low fluid intake after 5 PM, timed voiding every 2 hours, and decrease caffeine intake.      Plan  Follow up in 6 month  Urine DNA test  Ditropan 5mg BID  Advised to Discontinue Proscar because of sexual side effects        Diony Marte is noted in assessment to have a    *   . The patient's individualized treatment will therefore consist of:        Scribe Attestation  By signing my name below, I, Katelyn Casalla, Scribe   attest that this documentation has been prepared under the direction and in the presence of Celio Pierce MD MPH.

## 2024-01-22 DIAGNOSIS — R35.0 URINARY FREQUENCY: ICD-10-CM

## 2024-01-22 RX ORDER — VIBEGRON 75 MG/1
75 TABLET, FILM COATED ORAL DAILY
Qty: 30 TABLET | Refills: 1 | Status: SHIPPED | OUTPATIENT
Start: 2024-01-22 | End: 2024-02-09 | Stop reason: SDUPTHER

## 2024-02-05 DIAGNOSIS — K21.9 GASTROESOPHAGEAL REFLUX DISEASE, UNSPECIFIED WHETHER ESOPHAGITIS PRESENT: ICD-10-CM

## 2024-02-06 DIAGNOSIS — K21.9 GASTROESOPHAGEAL REFLUX DISEASE, UNSPECIFIED WHETHER ESOPHAGITIS PRESENT: Primary | ICD-10-CM

## 2024-02-06 RX ORDER — HYDROGEN PEROXIDE 3 %
SOLUTION, NON-ORAL MISCELLANEOUS
Qty: 90 CAPSULE | Refills: 0 | Status: SHIPPED | OUTPATIENT
Start: 2024-02-06 | End: 2024-02-06 | Stop reason: ALTCHOICE

## 2024-02-06 RX ORDER — OMEPRAZOLE 20 MG/1
20 TABLET, DELAYED RELEASE ORAL DAILY
Qty: 90 TABLET | Refills: 1 | COMMUNITY
Start: 2024-02-06 | End: 2025-02-05

## 2024-02-09 DIAGNOSIS — R35.0 URINARY FREQUENCY: ICD-10-CM

## 2024-02-09 RX ORDER — HYDROGEN PEROXIDE 3 %
SOLUTION, NON-ORAL MISCELLANEOUS
Qty: 90 CAPSULE | Refills: 1 | Status: SHIPPED | OUTPATIENT
Start: 2024-02-09

## 2024-02-09 RX ORDER — VIBEGRON 75 MG/1
75 TABLET, FILM COATED ORAL DAILY
Qty: 30 TABLET | Refills: 1 | Status: SHIPPED | OUTPATIENT
Start: 2024-02-09 | End: 2024-02-16 | Stop reason: SDUPTHER

## 2024-02-16 DIAGNOSIS — R35.0 URINARY FREQUENCY: ICD-10-CM

## 2024-02-16 RX ORDER — VIBEGRON 75 MG/1
75 TABLET, FILM COATED ORAL DAILY
Qty: 30 TABLET | Refills: 1 | Status: SHIPPED | OUTPATIENT
Start: 2024-02-16 | End: 2024-05-03

## 2024-03-15 DIAGNOSIS — L65.9 ALOPECIA: ICD-10-CM

## 2024-03-22 ENCOUNTER — APPOINTMENT (OUTPATIENT)
Dept: IMMUNOLOGY | Facility: CLINIC | Age: 39
End: 2024-03-22
Payer: COMMERCIAL

## 2024-03-25 ENCOUNTER — CLINICAL SUPPORT (OUTPATIENT)
Dept: IMMUNOLOGY | Facility: CLINIC | Age: 39
End: 2024-03-25
Payer: COMMERCIAL

## 2024-03-25 DIAGNOSIS — Z77.21 EXPOSURE TO POTENTIALLY HAZARDOUS BODY FLUIDS: ICD-10-CM

## 2024-03-25 DIAGNOSIS — Z79.899 HIGH RISK MEDICATION USE: ICD-10-CM

## 2024-03-25 LAB
ALBUMIN SERPL BCP-MCNC: 4.6 G/DL (ref 3.4–5)
ANION GAP SERPL CALC-SCNC: 11 MMOL/L (ref 10–20)
BUN SERPL-MCNC: 14 MG/DL (ref 6–23)
CALCIUM SERPL-MCNC: 9.6 MG/DL (ref 8.6–10.6)
CHLORIDE SERPL-SCNC: 102 MMOL/L (ref 98–107)
CO2 SERPL-SCNC: 29 MMOL/L (ref 21–32)
CREAT SERPL-MCNC: 1.23 MG/DL (ref 0.5–1.3)
EGFRCR SERPLBLD CKD-EPI 2021: 78 ML/MIN/1.73M*2
GLUCOSE SERPL-MCNC: 92 MG/DL (ref 74–99)
HIV 1+2 AB+HIV1 P24 AG SERPL QL IA: NONREACTIVE
PHOSPHATE SERPL-MCNC: 2.2 MG/DL (ref 2.5–4.9)
POTASSIUM SERPL-SCNC: 4.1 MMOL/L (ref 3.5–5.3)
SODIUM SERPL-SCNC: 138 MMOL/L (ref 136–145)
TREPONEMA PALLIDUM IGG+IGM AB [PRESENCE] IN SERUM OR PLASMA BY IMMUNOASSAY: NONREACTIVE

## 2024-03-25 PROCEDURE — 86780 TREPONEMA PALLIDUM: CPT

## 2024-03-25 PROCEDURE — 80069 RENAL FUNCTION PANEL: CPT

## 2024-03-25 PROCEDURE — 36415 COLL VENOUS BLD VENIPUNCTURE: CPT

## 2024-03-25 PROCEDURE — 87389 HIV-1 AG W/HIV-1&-2 AB AG IA: CPT

## 2024-03-25 PROCEDURE — 87800 DETECT AGNT MULT DNA DIREC: CPT

## 2024-03-26 LAB
C TRACH RRNA SPEC QL NAA+PROBE: NEGATIVE
N GONORRHOEA DNA SPEC QL PROBE+SIG AMP: NEGATIVE

## 2024-04-01 RX ORDER — FINASTERIDE 5 MG/1
TABLET, FILM COATED ORAL
Qty: 30 TABLET | Refills: 3 | OUTPATIENT
Start: 2024-04-01

## 2024-04-05 DIAGNOSIS — L65.9 ALOPECIA: Primary | ICD-10-CM

## 2024-04-05 RX ORDER — FINASTERIDE 5 MG/1
TABLET, FILM COATED ORAL
Qty: 30 TABLET | Refills: 1 | Status: SHIPPED | OUTPATIENT
Start: 2024-04-05

## 2024-04-08 DIAGNOSIS — E78.5 DYSLIPIDEMIA: ICD-10-CM

## 2024-04-18 ENCOUNTER — APPOINTMENT (OUTPATIENT)
Dept: PRIMARY CARE | Facility: CLINIC | Age: 39
End: 2024-04-18
Payer: COMMERCIAL

## 2024-04-29 DIAGNOSIS — R35.0 URINARY FREQUENCY: Primary | ICD-10-CM

## 2024-05-02 RX ORDER — ATORVASTATIN CALCIUM 40 MG/1
40 TABLET, FILM COATED ORAL NIGHTLY
Qty: 90 TABLET | Refills: 3 | OUTPATIENT
Start: 2024-05-02

## 2024-05-03 RX ORDER — VIBEGRON 75 MG/1
75 TABLET, FILM COATED ORAL DAILY
Qty: 90 TABLET | Refills: 0 | Status: SHIPPED | OUTPATIENT
Start: 2024-05-03 | End: 2024-05-17

## 2024-05-14 ENCOUNTER — OFFICE VISIT (OUTPATIENT)
Dept: UROLOGY | Facility: HOSPITAL | Age: 39
End: 2024-05-14
Payer: COMMERCIAL

## 2024-05-14 DIAGNOSIS — R39.9 LOWER URINARY TRACT SYMPTOMS (LUTS): Primary | ICD-10-CM

## 2024-05-14 PROCEDURE — 99214 OFFICE O/P EST MOD 30 MIN: CPT | Performed by: STUDENT IN AN ORGANIZED HEALTH CARE EDUCATION/TRAINING PROGRAM

## 2024-05-14 PROCEDURE — 1036F TOBACCO NON-USER: CPT | Performed by: STUDENT IN AN ORGANIZED HEALTH CARE EDUCATION/TRAINING PROGRAM

## 2024-05-14 RX ORDER — TADALAFIL 5 MG/1
5 TABLET ORAL DAILY
Qty: 30 TABLET | Refills: 3 | Status: SHIPPED | OUTPATIENT
Start: 2024-05-14 | End: 2024-09-11

## 2024-05-14 ASSESSMENT — PAIN SCALES - GENERAL: PAINLEVEL: 0-NO PAIN

## 2024-05-14 NOTE — PROGRESS NOTES
"Subjective   Patient ID: Diony Marte \"Leandro\" is a 38 y.o. male    HPI  38 y.o. male who presenting for 4 months follow up for urinary frequency, which has somewhat improved after eliminating caffeine from his diet. He reports no caffeine intake for the past three months, except for occasional chocolate. Previously, the patient was on oxybutynin, which was discontinued due to side effects of dry eyes. Currently, they are on gemtesa,            Review of Systems    All systems were reviewed. Anything negative was noted in the HPI.    Objective   Physical Exam    General: Well developed, well nourished, alert and cooperative, appears in no acute distress   Eyes: Non-injected conjunctiva, sclera clear, no proptosis   Cardiac: Extremities are warm and well perfused. No edema, cyanosis or pallor   Lungs: Breathing is easy, non-labored. Speaking in clear and complete sentences. Normal diaphragmatic movement   MSK: Ambulatory with steady gait, unassisted   Neuro: Alert and oriented to person, place, and time   Psych: Demonstrates good judgment and reason, without hallucinations, abnormal affect or abnormal behaviors   Skin: No obvious lesions, no rashes       No CVA tenderness bilaterally   No suprapubic pain or discomfort       Past Medical History:   Diagnosis Date    Body mass index (BMI) 27.0-27.9, adult 12/13/2018    BMI 27.0-27.9,adult    Contact with and (suspected) exposure to other viral communicable diseases 02/21/2019    Exposure to viral disease    Disorder of bone, unspecified 04/12/2021    Bone lesion    Dysphagia, pharyngoesophageal phase 02/06/2017    Pharyngoesophageal dysphagia    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    Recurrent cold sores    Low back pain, unspecified 12/17/2018    Lumbar pain    Otalgia, bilateral 04/10/2021    Otalgia of both ears    Other specified disorders of nose and nasal sinuses 10/12/2020    Tenderness over frontal sinus    Otitis media, unspecified, bilateral " 03/11/2021    BOM (bilateral otitis media)    Otitis media, unspecified, right ear 03/07/2017    Acute otitis media, right    Pain in right knee 03/11/2016    Acute pain of right knee    Personal history of other diseases of the musculoskeletal system and connective tissue 11/30/2020    History of muscle pain    Personal history of other diseases of the musculoskeletal system and connective tissue 11/06/2014    History of neck pain    Personal history of other diseases of the musculoskeletal system and connective tissue     History of radiculopathy    Personal history of other diseases of the respiratory system 04/10/2021    History of sore throat    Personal history of other diseases of the respiratory system 03/26/2018    History of acute sinusitis    Personal history of other diseases of the respiratory system 03/22/2018    History of sore throat    Personal history of other diseases of the respiratory system 04/03/2020    History of paranasal sinus congestion    Personal history of other diseases of the respiratory system     Personal history of asthma    Personal history of other endocrine, nutritional and metabolic disease 11/26/2019    History of hyperlipidemia    Personal history of other infectious and parasitic diseases 03/26/2018    History of viral infection    Personal history of other mental and behavioral disorders 11/20/2019    History of depression    Personal history of other specified conditions 11/30/2020    History of diarrhea    Personal history of other specified conditions 04/09/2021    History of dizziness    Personal history of peptic ulcer disease 02/27/2019    History of gastric ulcer    Personal history of peptic ulcer disease     Personal history of gastric ulcer    Unspecified otitis externa, right ear 03/22/2018    Right otitis externa         Past Surgical History:   Procedure Laterality Date    TONSILLECTOMY  11/06/2014    Tonsillectomy           Assessment/Plan   Follow up for  urinary frequency, well controlled wit Gemtesa     38 y.o. male who presents for the above condition, We had a very long and extensive discussion with the patient regarding the pathophysiology, differential diagnosis, risk factor, management, natural history, incidence and diagnostic work-up of the condition.      The patient is advised to reduce the frequency of gemtesa intake to three times a week for the next six months, then potentially reduce further to twice a week based on symptom control. Discussion about a newer technology, Axon nerve  neuromodulation, was initiated as a possible long-term solution to manage symptoms more permanently. The patient will consider this option and further discussions will be held in follow-up visits.    Plan:  - Follow up in 1 year    5/14/2024    Scribe Attestation  By signing my name below, I, Willie Dave, Scrmoody   attest that this documentation has been prepared under the direction and in the presence of Dr. Celio Pierce

## 2024-05-16 DIAGNOSIS — R35.0 URINARY FREQUENCY: ICD-10-CM

## 2024-05-17 RX ORDER — VIBEGRON 75 MG/1
1 TABLET, FILM COATED ORAL DAILY
Qty: 30 TABLET | Refills: 1 | Status: SHIPPED | OUTPATIENT
Start: 2024-05-17

## 2024-06-04 DIAGNOSIS — E78.5 DYSLIPIDEMIA: ICD-10-CM

## 2024-06-04 RX ORDER — ATORVASTATIN CALCIUM 40 MG/1
40 TABLET, FILM COATED ORAL NIGHTLY
Qty: 90 TABLET | Refills: 1 | Status: SHIPPED | OUTPATIENT
Start: 2024-06-04 | End: 2024-12-01

## 2024-06-17 ENCOUNTER — OFFICE VISIT (OUTPATIENT)
Dept: IMMUNOLOGY | Facility: CLINIC | Age: 39
End: 2024-06-17
Payer: COMMERCIAL

## 2024-06-17 ENCOUNTER — DOCUMENTATION (OUTPATIENT)
Dept: IMMUNOLOGY | Facility: CLINIC | Age: 39
End: 2024-06-17
Payer: COMMERCIAL

## 2024-06-17 VITALS
TEMPERATURE: 98.3 F | RESPIRATION RATE: 16 BRPM | BODY MASS INDEX: 25.69 KG/M2 | WEIGHT: 200.2 LBS | OXYGEN SATURATION: 98 % | DIASTOLIC BLOOD PRESSURE: 72 MMHG | HEIGHT: 74 IN | HEART RATE: 72 BPM | SYSTOLIC BLOOD PRESSURE: 107 MMHG

## 2024-06-17 DIAGNOSIS — Z77.21 EXPOSURE TO POTENTIALLY HAZARDOUS BODY FLUIDS: ICD-10-CM

## 2024-06-17 DIAGNOSIS — Z79.899 HIGH RISK MEDICATION USE: ICD-10-CM

## 2024-06-17 DIAGNOSIS — Z11.3 ROUTINE SCREENING FOR STI (SEXUALLY TRANSMITTED INFECTION): ICD-10-CM

## 2024-06-17 LAB
ALBUMIN SERPL BCP-MCNC: 4.5 G/DL (ref 3.4–5)
ANION GAP SERPL CALC-SCNC: 12 MMOL/L (ref 10–20)
BUN SERPL-MCNC: 14 MG/DL (ref 6–23)
CALCIUM SERPL-MCNC: 9.6 MG/DL (ref 8.6–10.6)
CHLORIDE SERPL-SCNC: 103 MMOL/L (ref 98–107)
CO2 SERPL-SCNC: 31 MMOL/L (ref 21–32)
CREAT SERPL-MCNC: 1.26 MG/DL (ref 0.5–1.3)
EGFRCR SERPLBLD CKD-EPI 2021: 76 ML/MIN/1.73M*2
GLUCOSE SERPL-MCNC: 92 MG/DL (ref 74–99)
HIV 1+2 AB+HIV1 P24 AG SERPL QL IA: NONREACTIVE
PHOSPHATE SERPL-MCNC: 3.3 MG/DL (ref 2.5–4.9)
POTASSIUM SERPL-SCNC: 4.2 MMOL/L (ref 3.5–5.3)
SODIUM SERPL-SCNC: 142 MMOL/L (ref 136–145)
TREPONEMA PALLIDUM IGG+IGM AB [PRESENCE] IN SERUM OR PLASMA BY IMMUNOASSAY: NONREACTIVE

## 2024-06-17 PROCEDURE — 1036F TOBACCO NON-USER: CPT | Performed by: NURSE PRACTITIONER

## 2024-06-17 PROCEDURE — 86780 TREPONEMA PALLIDUM: CPT | Performed by: NURSE PRACTITIONER

## 2024-06-17 PROCEDURE — 99214 OFFICE O/P EST MOD 30 MIN: CPT | Performed by: NURSE PRACTITIONER

## 2024-06-17 PROCEDURE — 84100 ASSAY OF PHOSPHORUS: CPT | Performed by: NURSE PRACTITIONER

## 2024-06-17 PROCEDURE — 87389 HIV-1 AG W/HIV-1&-2 AB AG IA: CPT | Performed by: NURSE PRACTITIONER

## 2024-06-17 PROCEDURE — 36415 COLL VENOUS BLD VENIPUNCTURE: CPT | Performed by: NURSE PRACTITIONER

## 2024-06-17 PROCEDURE — 87491 CHLMYD TRACH DNA AMP PROBE: CPT | Performed by: NURSE PRACTITIONER

## 2024-06-17 RX ORDER — ESOMEPRAZOLE MAGNESIUM 40 MG/1
CAPSULE, DELAYED RELEASE ORAL
COMMUNITY

## 2024-06-17 RX ORDER — BUPROPION HYDROCHLORIDE 150 MG/1
1 TABLET ORAL
COMMUNITY
Start: 2024-06-06

## 2024-06-17 ASSESSMENT — ENCOUNTER SYMPTOMS
PSYCHIATRIC NEGATIVE: 1
EYES NEGATIVE: 1
CARDIOVASCULAR NEGATIVE: 1
ALLERGIC/IMMUNOLOGIC NEGATIVE: 1
NEUROLOGICAL NEGATIVE: 1
CONSTITUTIONAL NEGATIVE: 1
RESPIRATORY NEGATIVE: 1
HEMATOLOGIC/LYMPHATIC NEGATIVE: 1
ENDOCRINE NEGATIVE: 1
GASTROINTESTINAL NEGATIVE: 1

## 2024-06-17 ASSESSMENT — PAIN SCALES - GENERAL: PAINLEVEL: 0-NO PAIN

## 2024-06-17 NOTE — LETTER
06/17/24    Hay Robledo MD  [address]  [place] [address]      Dear Dr. Hay Robledo MD,    I am writing to confirm that your patient, Leandro Marte, received care in my office on 06/17/24. I have enclosed a summary of the care provided to Leandro for your reference.    Please contact me with any questions you may have regarding the visit.    Sincerely,         Bety Jain, APRN-CNP  [address]  [address]  [facility] [address]  [phone]    CC: No Recipients

## 2024-06-17 NOTE — PROGRESS NOTES
HIV PrEP Clinic Follow-up Visit:    Diony Marte is a 38 y.o. male being seen for PrEP for prevention of HIV infection.  Current PrEP therapy:  Descovy    Risk factors for HIV infection include: (select all that apply):  MSM    Regular condom use? Never  Received treatment for STD since last seen? 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    Recurrent cold sores    Low back pain, unspecified 12/17/2018    Lumbar pain    Otalgia, bilateral 04/10/2021    Otalgia of both ears    Other specified disorders of nose and nasal sinuses 10/12/2020    Tenderness over frontal sinus    Otitis media, unspecified, bilateral 03/11/2021    BOM (bilateral otitis media)    Otitis media, unspecified, right ear 03/07/2017    Acute otitis media, right    Pain in right knee 03/11/2016    Acute pain of right knee    Personal history of other diseases of the musculoskeletal system and connective tissue 11/30/2020    History of muscle pain    Personal history of other diseases of the musculoskeletal system and connective tissue 11/06/2014    History of neck pain    Personal history of other diseases of the musculoskeletal system and connective tissue     History of radiculopathy    Personal history of other diseases of the respiratory system 04/10/2021    History of sore throat    Personal history of other diseases of the respiratory system 03/26/2018    History of acute sinusitis    Personal history of other diseases of the respiratory system 03/22/2018    History of sore throat    Personal history of other diseases of the  respiratory system 04/03/2020    History of paranasal sinus congestion    Personal history of other diseases of the respiratory system     Personal history of asthma    Personal history of other endocrine, nutritional and metabolic disease 11/26/2019    History of hyperlipidemia    Personal history of other infectious and parasitic diseases 03/26/2018    History of viral infection    Personal history of other mental and behavioral disorders 11/20/2019    History of depression    Personal history of other specified conditions 11/30/2020    History of diarrhea    Personal history of other specified conditions 04/09/2021    History of dizziness    Personal history of peptic ulcer disease 02/27/2019    History of gastric ulcer    Personal history of peptic ulcer disease     Personal history of gastric ulcer    Unspecified otitis externa, right ear 03/22/2018    Right otitis externa       Allergies  Allergies   Allergen Reactions    Aripiprazole Unknown    Midazolam Hallucinations    Amoxicillin Rash    Celecoxib Rash       Current Medications:    Current Outpatient Medications:     ALPRAZolam (Xanax) 0.5 mg tablet, Take 1 tablet (0.5 mg) by mouth once daily at bedtime., Disp: , Rfl:     atomoxetine (Strattera) 40 mg capsule, , Disp: , Rfl:     atorvastatin (Lipitor) 40 mg tablet, Take 1 tablet (40 mg) by mouth once daily at bedtime., Disp: 90 tablet, Rfl: 1    doxycycline (Vibra-Tabs) 100 mg tablet, Take 2 tablets after unprotected sexual encounter. Not to be taken daily., Disp: 30 tablet, Rfl: 0    esomeprazole (NexIUM) 20 mg DR capsule, TAKE 1 CAPSULE DAILY AS NEEDED FOR DYSPEPSIA, Disp: 90 capsule, Rfl: 1    finasteride (Proscar) 5 mg tablet, Take 1/4 tablet daily, Disp: 30 tablet, Rfl: 1    Gemtesa 75 mg tablet, TAKE 1 TABLET BY MOUTH ONCE DAILY., Disp: 30 tablet, Rfl: 1    lamoTRIgine (LaMICtal) 200 mg tablet, Take 2 tablets (400 mg) by mouth once daily., Disp: , Rfl:     lurasidone (Latuda) 60 mg tablet, , Disp: ,  Rfl:     omeprazole OTC (PriLOSEC OTC) 20 mg EC tablet, Take 1 tablet (20 mg) by mouth once daily. Do not crush, chew, or split., Disp: 90 tablet, Rfl: 1    tadalafil (Cialis) 5 mg tablet, Take 1 tablet (5 mg) by mouth once daily., Disp: 30 tablet, Rfl: 3    valACYclovir (Valtrex) 1 gram tablet, TAKE 2 TABLETS EVERY 12 HOURS FOR 1 DAY AS NEEDED AT EARLY ONSET OF COLD SORES, Disp: 40 tablet, Rfl: 1    Current Facility-Administered Medications:     ciprofloxacin (Cipro) tablet 500 mg, 500 mg, oral, Once, Celio Pierce MD MPH    Immunizations:  Immunization History   Administered Date(s) Administered    Flu vaccine (IIV4), preservative free *Check age/dose* 09/30/2022, 10/09/2023    HPV 9-valent vaccine (GARDASIL 9) 12/14/2021, 01/21/2022, 07/22/2022    Hep A, Unspecified 10/26/2015    Hepatitis A vaccine, age 19 years and greater (HAVRIX) 12/13/2018    Hepatitis B vaccine, adult *Check Product/Dose* 01/21/2022, 05/05/2022, 07/22/2022    Influenza, Unspecified 10/19/2015, 09/12/2020    MMR vaccine, subcutaneous (MMR II) 07/23/2021    Pfizer Gray Cap SARS-CoV-2 04/28/2022    Pfizer Purple Cap SARS-CoV-2 03/12/2021, 04/02/2021, 12/01/2021    Small pox and monkeypox vaccine (Jynneos) *check dose/route* 08/10/2022    Tdap vaccine, age 7 year and older (BOOSTRIX, ADACEL) 10/26/2015, 01/21/2022    Vaccinia (smallpox) 09/07/2022       Review of systems  Review of Systems   Constitutional: Negative.    HENT: Negative.     Eyes: Negative.    Respiratory: Negative.     Cardiovascular: Negative.    Gastrointestinal: Negative.    Endocrine: Negative.    Genitourinary: Negative.    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   Used to get shots but doesn't anymore.  He has been told he is really too young for back surgery.  He manages.    Skin: Negative.    Allergic/Immunologic: Negative.    Neurological: Negative.    Hematological:  "Negative.    Psychiatric/Behavioral: Negative.         PHYSICAL EXAMINATION:  Visit Vitals  Smoking Status Never       Physical Exam   Physical Exam  Vitals reviewed.   Constitutional:       Appearance: Normal appearance.   HENT:      Head: Normocephalic.   Eyes:      Pupils: Pupils are equal, round, and reactive to light.   Cardiovascular:      Rate and Rhythm: Normal rate and regular rhythm.      Heart sounds: Normal heart sounds.   Pulmonary:      Effort: Pulmonary effort is normal.      Breath sounds: Normal breath sounds.   Abdominal:      General: Bowel sounds are normal.      Palpations: Abdomen is soft.   Musculoskeletal:         General: Normal range of motion.      Cervical back: Normal range of motion.   Skin:     General: Skin is warm and dry.   Neurological:      Mental Status: He is alert and oriented to person, place, and time.   Psychiatric:         Mood and Affect: Mood normal.         Behavior: Behavior normal.         Pertinent data review:  HIV 1/2 Antigen/Antibody Screen with Reflex to Confirmation   Date Value Ref Range Status   03/25/2024 Nonreactive Nonreactive Final     Syphilis Total Ab   Date Value Ref Range Status   03/25/2024 Nonreactive Nonreactive Final     Comment:     No significant level of Treponema pallidum antibody detected.   Repeat testing in 2 to 4 weeks may be considered if early   infection or incubating syphilis infection is suspected.     No results found for: \"VDRLCSF\"  No results found for: \"RPR\"  Hepatitis C Ab   Date Value Ref Range Status   02/22/2019 NON-REACTIVE NONREACTIVE Final     Comment:      Patients receiving more than 5 mg/day of biotin may have interference   in test results.  A sample should be taken no sooner than eight hours   after  previous dose. Contact [phone] for additional information.        Hepatitis B Surface Ag   Date Value Ref Range Status   02/22/2019 NONREACTIVE NONREACTIVE Final     Comment:      Patients receiving more than 5 mg/day " "of biotin may have interference   in test results.  A sample should be taken no sooner than eight hours   after  previous dose. Contact [phone] for additional information.        Hepatitis B Surface Ab   Date Value Ref Range Status   08/08/2023 100.4 <10 mIU/mL Final     Comment:     INTERPRETIVE CRITERIA:  <10 mIU/mL....NONREACTIVE    >=10 mIU/mL...REACTIVE   .   Biotin interference may cause falsely decreased results.   Patients taking a Biotin dose of up to 5 mg/day should   refrain from taking Biotin for 24 hours before sample   collection. Providers may contact their local laboratory   for further information.       No results found for: \"HAVTO\"  Glucose   Date Value Ref Range Status   03/25/2024 92 74 - 99 mg/dL Final     Sodium   Date Value Ref Range Status   03/25/2024 138 136 - 145 mmol/L Final     Potassium   Date Value Ref Range Status   03/25/2024 4.1 3.5 - 5.3 mmol/L Final     Chloride   Date Value Ref Range Status   03/25/2024 102 98 - 107 mmol/L Final     Anion Gap   Date Value Ref Range Status   03/25/2024 11 10 - 20 mmol/L Final     Urea Nitrogen   Date Value Ref Range Status   03/25/2024 14 6 - 23 mg/dL Final     Creatinine   Date Value Ref Range Status   03/25/2024 1.23 0.50 - 1.30 mg/dL Final     eGFR   Date Value Ref Range Status   03/25/2024 78 >60 mL/min/1.73m*2 Final     Comment:     Calculations of estimated GFR are performed using the 2021 CKD-EPI Study Refit equation without the race variable for the IDMS-Traceable creatinine methods.  https://jasn.asnjournals.org/content/early/2021/09/22/ASN.1939194112     Calcium   Date Value Ref Range Status   03/25/2024 9.6 8.6 - 10.6 mg/dL Final     Phosphorus   Date Value Ref Range Status   03/25/2024 2.2 (L) 2.5 - 4.9 mg/dL Final     Comment:     The performance characteristics of phosphorus testing in heparinized plasma have been validated by the individual  laboratory site where testing is performed. Testing on heparinized plasma is not " approved by the FDA; however, such approval is not necessary.     Albumin   Date Value Ref Range Status   03/25/2024 4.6 3.4 - 5.0 g/dL Final       Assessment and Plan:  Diony Marte is a 38 y.o.male seen today for evaluation of appropriateness for continuation of  PrEP therapy as they continue to be at greater risk for acquiring HIV infection.  HIV Ag/Ab, Syphilis testing, and renal function will be collected today.  GC NAAT testing will be obtained from 2 sites: throat, rectum.  If HIV testing negative, prescription for PrEP will be renewed.    He is already on doxy pep.  He did get the mpoxx two years ago.  Discussed safety in the heat we are supposed to have this week.  Will get swabs and labs today.   See again in 6 months.  Problem List Items Addressed This Visit    None  Thank you for coming in to see us today.  We are getting routine labs.  Please call us with any questions or concerns.     Bety Jain, JOSE-CNP

## 2024-06-17 NOTE — PROGRESS NOTES
PT had no additional concerns.  PT is staying adherent to PrEP.   I will have 3 month lab orders placed.      Moises Horton  HIV Prevention Specialist/PrEP Navigator

## 2024-06-18 LAB
C TRACH RRNA SPEC QL NAA+PROBE: NEGATIVE
N GONORRHOEA DNA SPEC QL PROBE+SIG AMP: NEGATIVE

## 2024-06-19 DIAGNOSIS — B00.1 RECURRENT COLD SORES: ICD-10-CM

## 2024-06-21 RX ORDER — VALACYCLOVIR HYDROCHLORIDE 1 G/1
TABLET, FILM COATED ORAL
Qty: 40 TABLET | Refills: 3 | OUTPATIENT
Start: 2024-06-21

## 2024-07-09 DIAGNOSIS — Z77.21 PERSONAL HISTORY OF EXPOSURE TO POTENTIALLY HAZARDOUS BODY FLUIDS: Primary | ICD-10-CM

## 2024-07-09 RX ORDER — EMTRICITABINE AND TENOFOVIR ALAFENAMIDE 200; 25 MG/1; MG/1
TABLET ORAL
Status: CANCELLED
Start: 2024-07-09

## 2024-07-10 RX ORDER — EMTRICITABINE AND TENOFOVIR ALAFENAMIDE 200; 25 MG/1; MG/1
1 TABLET ORAL DAILY
Qty: 30 TABLET | Refills: 2 | Status: SHIPPED | OUTPATIENT
Start: 2024-07-10 | End: 2024-08-09

## 2024-07-24 DIAGNOSIS — Z77.21 PERSONAL HISTORY OF EXPOSURE TO POTENTIALLY HAZARDOUS BODY FLUIDS: ICD-10-CM

## 2024-07-24 DIAGNOSIS — R35.0 URINARY FREQUENCY: ICD-10-CM

## 2024-07-24 RX ORDER — EMTRICITABINE AND TENOFOVIR ALAFENAMIDE 200; 25 MG/1; MG/1
1 TABLET ORAL DAILY
Qty: 90 TABLET | Refills: 0 | Status: SHIPPED | OUTPATIENT
Start: 2024-07-24

## 2024-07-24 RX ORDER — VIBEGRON 75 MG/1
TABLET, FILM COATED ORAL
Qty: 90 TABLET | Refills: 3 | Status: SHIPPED | OUTPATIENT
Start: 2024-07-24

## 2024-07-24 RX ORDER — DOXYCYCLINE HYCLATE 100 MG
TABLET ORAL
Qty: 30 TABLET | Refills: 0 | Status: SHIPPED | OUTPATIENT
Start: 2024-07-24

## 2024-08-23 DIAGNOSIS — E78.5 DYSLIPIDEMIA: ICD-10-CM

## 2024-08-23 DIAGNOSIS — B00.1 RECURRENT COLD SORES: ICD-10-CM

## 2024-08-23 DIAGNOSIS — K21.9 GASTROESOPHAGEAL REFLUX DISEASE, UNSPECIFIED WHETHER ESOPHAGITIS PRESENT: ICD-10-CM

## 2024-08-23 RX ORDER — VALACYCLOVIR HYDROCHLORIDE 1 G/1
TABLET, FILM COATED ORAL
Qty: 40 TABLET | Refills: 0 | Status: SHIPPED | OUTPATIENT
Start: 2024-08-23

## 2024-08-23 RX ORDER — HYDROGEN PEROXIDE 3 %
SOLUTION, NON-ORAL MISCELLANEOUS
Qty: 90 CAPSULE | Refills: 0 | Status: SHIPPED | OUTPATIENT
Start: 2024-08-23

## 2024-08-23 RX ORDER — ATORVASTATIN CALCIUM 40 MG/1
40 TABLET, FILM COATED ORAL NIGHTLY
Qty: 90 TABLET | Refills: 0 | Status: SHIPPED | OUTPATIENT
Start: 2024-08-23 | End: 2025-02-19

## 2024-09-09 DIAGNOSIS — Z77.21 PERSONAL HISTORY OF EXPOSURE TO POTENTIALLY HAZARDOUS BODY FLUIDS: ICD-10-CM

## 2024-09-16 ENCOUNTER — APPOINTMENT (OUTPATIENT)
Dept: IMMUNOLOGY | Facility: CLINIC | Age: 39
End: 2024-09-16
Payer: COMMERCIAL

## 2024-09-17 ENCOUNTER — APPOINTMENT (OUTPATIENT)
Dept: IMMUNOLOGY | Facility: CLINIC | Age: 39
End: 2024-09-17
Payer: COMMERCIAL

## 2024-09-20 ENCOUNTER — CLINICAL SUPPORT (OUTPATIENT)
Dept: IMMUNOLOGY | Facility: CLINIC | Age: 39
End: 2024-09-20
Payer: COMMERCIAL

## 2024-09-20 DIAGNOSIS — Z77.21 PERSONAL HISTORY OF EXPOSURE TO POTENTIALLY HAZARDOUS BODY FLUIDS: ICD-10-CM

## 2024-09-20 DIAGNOSIS — Z00.00 HEALTHCARE MAINTENANCE: ICD-10-CM

## 2024-09-20 DIAGNOSIS — Z23 NEED FOR VACCINATION: ICD-10-CM

## 2024-09-20 DIAGNOSIS — Z00.00 HEALTHCARE MAINTENANCE: Primary | ICD-10-CM

## 2024-09-20 LAB
ALBUMIN SERPL BCP-MCNC: 4.8 G/DL (ref 3.4–5)
ALP SERPL-CCNC: 48 U/L (ref 33–120)
ALT SERPL W P-5'-P-CCNC: 29 U/L (ref 10–52)
ANION GAP SERPL CALC-SCNC: 12 MMOL/L (ref 10–20)
APPEARANCE UR: CLEAR
AST SERPL W P-5'-P-CCNC: 20 U/L (ref 9–39)
BILIRUB SERPL-MCNC: 0.5 MG/DL (ref 0–1.2)
BILIRUB UR STRIP.AUTO-MCNC: NEGATIVE MG/DL
BUN SERPL-MCNC: 16 MG/DL (ref 6–23)
CALCIUM SERPL-MCNC: 9.4 MG/DL (ref 8.6–10.6)
CHLORIDE SERPL-SCNC: 99 MMOL/L (ref 98–107)
CHOLEST SERPL-MCNC: 153 MG/DL (ref 0–199)
CHOLESTEROL/HDL RATIO: 3.1
CO2 SERPL-SCNC: 30 MMOL/L (ref 21–32)
COLOR UR: YELLOW
CREAT SERPL-MCNC: 1.16 MG/DL (ref 0.5–1.3)
EGFRCR SERPLBLD CKD-EPI 2021: 83 ML/MIN/1.73M*2
ERYTHROCYTE [DISTWIDTH] IN BLOOD BY AUTOMATED COUNT: 12.2 % (ref 11.5–14.5)
GLUCOSE SERPL-MCNC: 84 MG/DL (ref 74–99)
GLUCOSE UR STRIP.AUTO-MCNC: NORMAL MG/DL
HCT VFR BLD AUTO: 40.2 % (ref 41–52)
HDLC SERPL-MCNC: 49.7 MG/DL
HGB BLD-MCNC: 13.3 G/DL (ref 13.5–17.5)
HIV 1+2 AB+HIV1 P24 AG SERPL QL IA: NONREACTIVE
HOLD SPECIMEN: NORMAL
KETONES UR STRIP.AUTO-MCNC: NEGATIVE MG/DL
LDLC SERPL CALC-MCNC: 89 MG/DL
LEUKOCYTE ESTERASE UR QL STRIP.AUTO: NEGATIVE
MCH RBC QN AUTO: 30.9 PG (ref 26–34)
MCHC RBC AUTO-ENTMCNC: 33.1 G/DL (ref 32–36)
MCV RBC AUTO: 93 FL (ref 80–100)
NITRITE UR QL STRIP.AUTO: NEGATIVE
NON HDL CHOLESTEROL: 103 MG/DL (ref 0–149)
NRBC BLD-RTO: 0 /100 WBCS (ref 0–0)
PH UR STRIP.AUTO: 7.5 [PH]
PHOSPHATE SERPL-MCNC: 2.7 MG/DL (ref 2.5–4.9)
PLATELET # BLD AUTO: 290 X10*3/UL (ref 150–450)
POTASSIUM SERPL-SCNC: 3.9 MMOL/L (ref 3.5–5.3)
PROT SERPL-MCNC: 7.3 G/DL (ref 6.4–8.2)
PROT UR STRIP.AUTO-MCNC: NEGATIVE MG/DL
RBC # BLD AUTO: 4.31 X10*6/UL (ref 4.5–5.9)
RBC # UR STRIP.AUTO: NEGATIVE /UL
SODIUM SERPL-SCNC: 137 MMOL/L (ref 136–145)
SP GR UR STRIP.AUTO: 1.02
TREPONEMA PALLIDUM IGG+IGM AB [PRESENCE] IN SERUM OR PLASMA BY IMMUNOASSAY: NONREACTIVE
TRIGL SERPL-MCNC: 70 MG/DL (ref 0–149)
UROBILINOGEN UR STRIP.AUTO-MCNC: NORMAL MG/DL
VLDL: 14 MG/DL (ref 0–40)
WBC # BLD AUTO: 4.5 X10*3/UL (ref 4.4–11.3)

## 2024-09-20 PROCEDURE — 90471 IMMUNIZATION ADMIN: CPT | Performed by: NURSE PRACTITIONER

## 2024-09-20 PROCEDURE — 84100 ASSAY OF PHOSPHORUS: CPT

## 2024-09-20 PROCEDURE — 86780 TREPONEMA PALLIDUM: CPT

## 2024-09-20 PROCEDURE — 84075 ASSAY ALKALINE PHOSPHATASE: CPT

## 2024-09-20 PROCEDURE — 80061 LIPID PANEL: CPT

## 2024-09-20 PROCEDURE — 85027 COMPLETE CBC AUTOMATED: CPT

## 2024-09-20 PROCEDURE — 87389 HIV-1 AG W/HIV-1&-2 AB AG IA: CPT

## 2024-09-20 PROCEDURE — 81003 URINALYSIS AUTO W/O SCOPE: CPT

## 2024-09-20 PROCEDURE — 87491 CHLMYD TRACH DNA AMP PROBE: CPT

## 2024-09-20 PROCEDURE — 36415 COLL VENOUS BLD VENIPUNCTURE: CPT

## 2024-09-21 LAB
C TRACH RRNA SPEC QL NAA+PROBE: NEGATIVE
N GONORRHOEA DNA SPEC QL PROBE+SIG AMP: NEGATIVE

## 2024-10-07 DIAGNOSIS — M25.512 ACUTE PAIN OF LEFT SHOULDER: Primary | ICD-10-CM

## 2024-10-08 ENCOUNTER — PATIENT MESSAGE (OUTPATIENT)
Dept: UROLOGY | Facility: HOSPITAL | Age: 39
End: 2024-10-08
Payer: COMMERCIAL

## 2024-10-10 ENCOUNTER — APPOINTMENT (OUTPATIENT)
Dept: PRIMARY CARE | Facility: CLINIC | Age: 39
End: 2024-10-10
Payer: COMMERCIAL

## 2024-10-10 VITALS
HEIGHT: 75 IN | WEIGHT: 192 LBS | RESPIRATION RATE: 12 BRPM | OXYGEN SATURATION: 99 % | SYSTOLIC BLOOD PRESSURE: 124 MMHG | DIASTOLIC BLOOD PRESSURE: 76 MMHG | HEART RATE: 100 BPM | BODY MASS INDEX: 23.87 KG/M2

## 2024-10-10 DIAGNOSIS — Z00.00 HEALTHCARE MAINTENANCE: Primary | ICD-10-CM

## 2024-10-10 DIAGNOSIS — E78.5 DYSLIPIDEMIA: ICD-10-CM

## 2024-10-10 DIAGNOSIS — K21.9 GASTROESOPHAGEAL REFLUX DISEASE, UNSPECIFIED WHETHER ESOPHAGITIS PRESENT: ICD-10-CM

## 2024-10-10 DIAGNOSIS — F41.9 ANXIETY: ICD-10-CM

## 2024-10-10 DIAGNOSIS — L65.9 ALOPECIA: ICD-10-CM

## 2024-10-10 DIAGNOSIS — B00.1 RECURRENT COLD SORES: ICD-10-CM

## 2024-10-10 PROBLEM — Q78.2 BONY SCLEROSIS (HHS-HCC): Status: RESOLVED | Noted: 2023-08-03 | Resolved: 2024-10-10

## 2024-10-10 PROBLEM — M25.512 ACUTE PAIN OF LEFT SHOULDER: Status: ACTIVE | Noted: 2024-10-10

## 2024-10-10 PROCEDURE — 99395 PREV VISIT EST AGE 18-39: CPT | Performed by: FAMILY MEDICINE

## 2024-10-10 PROCEDURE — 1036F TOBACCO NON-USER: CPT | Performed by: FAMILY MEDICINE

## 2024-10-10 PROCEDURE — 3008F BODY MASS INDEX DOCD: CPT | Performed by: FAMILY MEDICINE

## 2024-10-10 RX ORDER — DULOXETIN HYDROCHLORIDE 20 MG/1
40 CAPSULE, DELAYED RELEASE ORAL DAILY
Start: 2024-10-10 | End: 2025-10-10

## 2024-10-10 RX ORDER — ESOMEPRAZOLE MAGNESIUM 40 MG/1
40 CAPSULE, DELAYED RELEASE ORAL
Qty: 90 CAPSULE | Refills: 1 | Status: SHIPPED | OUTPATIENT
Start: 2024-10-10

## 2024-10-10 RX ORDER — FINASTERIDE 5 MG/1
TABLET, FILM COATED ORAL
Qty: 30 TABLET | Refills: 1 | Status: SHIPPED | OUTPATIENT
Start: 2024-10-10

## 2024-10-10 RX ORDER — VALACYCLOVIR HYDROCHLORIDE 1 G/1
TABLET, FILM COATED ORAL
Qty: 40 TABLET | Refills: 0 | Status: CANCELLED | OUTPATIENT
Start: 2024-10-10

## 2024-10-10 RX ORDER — ATORVASTATIN CALCIUM 40 MG/1
40 TABLET, FILM COATED ORAL NIGHTLY
Qty: 90 TABLET | Refills: 1 | Status: SHIPPED | OUTPATIENT
Start: 2024-10-10 | End: 2025-04-08

## 2024-10-10 ASSESSMENT — ENCOUNTER SYMPTOMS
DIARRHEA: 0
APPETITE CHANGE: 0
EYE PAIN: 0
COUGH: 0
CHEST TIGHTNESS: 0
EYE REDNESS: 0
DIZZINESS: 0
BRUISES/BLEEDS EASILY: 0
SORE THROAT: 0
ADENOPATHY: 0
FATIGUE: 0
CHILLS: 0
ARTHRALGIAS: 1
WEAKNESS: 0
HEADACHES: 0
SHORTNESS OF BREATH: 0
BLOOD IN STOOL: 0
ABDOMINAL DISTENTION: 0
ABDOMINAL PAIN: 0
DYSPHORIC MOOD: 0
CONSTIPATION: 0
DIFFICULTY URINATING: 0
NERVOUS/ANXIOUS: 0
DYSURIA: 0
FEVER: 0
BACK PAIN: 0

## 2024-10-10 NOTE — PROGRESS NOTES
"Subjective   Patient ID: Diony Marte \"Leandro\" is a 39 y.o. male who presents for Annual Exam.  PMHX, PSHx, Fam hx, and Social hx reviewed.   New concerns none  Vaccines current  Dentist seen at least yearly yes  Vision concerns no  Hearing concerns no  Diet is overall healthy.   Smoker - no  Lung CT/screening - NA  AAA screening - NA  Alcohol use - 3-6 drinks per wek  Exercising 4 days per week.   Sexually active - yes, no issues  Colonoscopy 2023 ,current, due in 5 yrs    Pt has Dyslipidemia.   Lipid panel showed LDL 89.  Currently taking Atorvastatin and is tolerating well without muscle pains or weakness.     GERD is well controlled on Nexium . Pt is taking medication daily. Denies epigastric pain, nausea, heartburn or water brash.     Pt has chronic and stable anxiety.  Pt has supportive family/friends.   Pt seeing a counselor.   Taking Latuda, Lamictal, Wellbutrin, and Cymabalta and it is helping.   Mood, energy, motivation, interests, sleep and appetite are adequate. Anxiety is stable  Pt denies suicidal ideations.             Review of Systems   Constitutional:  Negative for appetite change, chills, fatigue and fever.   HENT:  Negative for congestion, hearing loss and sore throat.    Eyes:  Negative for pain, redness and visual disturbance.   Respiratory:  Negative for cough, chest tightness and shortness of breath.    Cardiovascular:  Negative for chest pain and leg swelling.   Gastrointestinal:  Negative for abdominal distention, abdominal pain, blood in stool, constipation and diarrhea.   Genitourinary:  Negative for difficulty urinating and dysuria.   Musculoskeletal:  Positive for arthralgias (he brings up ~2months of L chest,/shoulder pain after heavy workout. Tried resting for 1 month but began having pain again with resuming workouts.). Negative for back pain.   Skin:  Negative for rash.   Neurological:  Negative for dizziness, weakness and headaches.   Hematological:  Negative for adenopathy. " "Does not bruise/bleed easily.   Psychiatric/Behavioral:  Negative for dysphoric mood. The patient is not nervous/anxious.        Objective   /76   Pulse 100   Resp 12   Ht 1.905 m (6' 3\")   Wt 87.1 kg (192 lb)   SpO2 99%   BMI 24.00 kg/m²    Physical Exam  Constitutional:       General: He is not in acute distress.     Appearance: Normal appearance. He is not ill-appearing.   HENT:      Head: Normocephalic and atraumatic.      Right Ear: Tympanic membrane, ear canal and external ear normal.      Left Ear: Tympanic membrane, ear canal and external ear normal.      Nose: Nose normal.      Mouth/Throat:      Mouth: Mucous membranes are moist.      Pharynx: No oropharyngeal exudate or posterior oropharyngeal erythema.   Eyes:      Extraocular Movements: Extraocular movements intact.      Conjunctiva/sclera: Conjunctivae normal.      Pupils: Pupils are equal, round, and reactive to light.   Neck:      Thyroid: No thyroid mass or thyromegaly.      Vascular: No carotid bruit.   Cardiovascular:      Rate and Rhythm: Normal rate and regular rhythm.      Heart sounds: Normal heart sounds. No murmur heard.  Pulmonary:      Breath sounds: Normal breath sounds. No wheezing, rhonchi or rales.   Abdominal:      General: Bowel sounds are normal. There is no distension.      Palpations: Abdomen is soft. There is no mass.      Tenderness: There is no abdominal tenderness.   Musculoskeletal:         General: No swelling or deformity.      Cervical back: Neck supple. No tenderness.   Lymphadenopathy:      Cervical: No cervical adenopathy.   Skin:     General: Skin is warm and dry.      Findings: No lesion or rash.   Neurological:      Mental Status: He is alert and oriented to person, place, and time.      Sensory: No sensory deficit.      Motor: No weakness.      Coordination: Coordination normal.      Deep Tendon Reflexes: Reflexes normal.   Psychiatric:         Mood and Affect: Mood normal.         Behavior: Behavior " normal.         Judgment: Judgment normal.       Assessment/Plan   Diagnoses and all orders for this visit:  Healthcare maintenance - vaccines current. Labs reviewed and discussed. Colonoscopy current.  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

## 2024-10-10 NOTE — PROGRESS NOTES
"Subjective   Patient ID: Leandro Marte is a 39 y.o. male who presents for Annual Exam.    HPI     Review of Systems    Objective   /76   Pulse 100   Resp 12   Ht 1.905 m (6' 3\")   Wt 87.1 kg (192 lb)   SpO2 99%   BMI 24.00 kg/m²     Physical Exam    Assessment/Plan          "

## 2024-10-18 ENCOUNTER — OFFICE VISIT (OUTPATIENT)
Dept: ORTHOPEDIC SURGERY | Facility: HOSPITAL | Age: 39
End: 2024-10-18
Payer: COMMERCIAL

## 2024-10-18 ENCOUNTER — HOSPITAL ENCOUNTER (OUTPATIENT)
Dept: RADIOLOGY | Facility: HOSPITAL | Age: 39
Discharge: HOME | End: 2024-10-18
Payer: COMMERCIAL

## 2024-10-18 VITALS — BODY MASS INDEX: 23.62 KG/M2 | HEIGHT: 75 IN | WEIGHT: 190 LBS

## 2024-10-18 DIAGNOSIS — S43.432A LABRAL TEAR OF SHOULDER, LEFT, INITIAL ENCOUNTER: ICD-10-CM

## 2024-10-18 DIAGNOSIS — M25.512 LEFT SHOULDER PAIN, UNSPECIFIED CHRONICITY: ICD-10-CM

## 2024-10-18 PROCEDURE — 3008F BODY MASS INDEX DOCD: CPT | Performed by: ORTHOPAEDIC SURGERY

## 2024-10-18 PROCEDURE — 73030 X-RAY EXAM OF SHOULDER: CPT | Mod: LT

## 2024-10-18 PROCEDURE — 99203 OFFICE O/P NEW LOW 30 MIN: CPT | Performed by: ORTHOPAEDIC SURGERY

## 2024-10-18 PROCEDURE — 99213 OFFICE O/P EST LOW 20 MIN: CPT | Performed by: ORTHOPAEDIC SURGERY

## 2024-10-18 ASSESSMENT — PAIN DESCRIPTION - DESCRIPTORS: DESCRIPTORS: ACHING

## 2024-10-18 ASSESSMENT — PAIN SCALES - GENERAL: PAINLEVEL_OUTOF10: 4

## 2024-10-18 ASSESSMENT — PAIN - FUNCTIONAL ASSESSMENT: PAIN_FUNCTIONAL_ASSESSMENT: 0-10

## 2024-10-18 NOTE — PROGRESS NOTES
Migration with left shoulder he has deep anterior shoulder pain for several months worse when working out particularly bench pressing he took a hiatus from exercising and it got better and then returned immediately when he tried bench pressing again pain is deep anterior portion of the shoulder no extension into the arm no paresthesias no neck pain no right shoulder symptoms.    The patient is pleasant and cooperative.  The patient is alert and oriented ×3.  Auditory function is intact.  The patient is a good historian.  The patient is not in acute distress.  Eye exam significant for nonicteric sclera, intact ocular muscle movement.  Breathing is rhythmic symmetric and nonlabored.  Left radial pulse palpable brisk Apley refill light touch sensation intact.  Active shoulder motion elevation 180 external rotation and abduction 90 internal rotation and abduction 70 degrees with pain.  Positive Jobes and Mills's tests.  No apprehension.    Radiographs appear normal no fracture or significant subluxation or arthritic degenerative changes.    Left shoulder pain    Patient has left shoulder pain and exam very suggestive of a SLAP lesion.  I am concerned about labrum pathology and I like the patient have an MRI arthrogram and follow-up after that has been done.    This was dictated using voice recognition software and not corrected for grammatical or spelling errors.

## 2024-10-28 ENCOUNTER — APPOINTMENT (OUTPATIENT)
Dept: UROLOGY | Facility: CLINIC | Age: 39
End: 2024-10-28
Payer: COMMERCIAL

## 2024-10-28 DIAGNOSIS — N52.9 ERECTILE DISORDER: Primary | ICD-10-CM

## 2024-10-28 PROCEDURE — 99214 OFFICE O/P EST MOD 30 MIN: CPT | Performed by: STUDENT IN AN ORGANIZED HEALTH CARE EDUCATION/TRAINING PROGRAM

## 2024-10-28 PROCEDURE — G2211 COMPLEX E/M VISIT ADD ON: HCPCS | Performed by: STUDENT IN AN ORGANIZED HEALTH CARE EDUCATION/TRAINING PROGRAM

## 2024-10-28 RX ORDER — TADALAFIL 10 MG/1
10 TABLET ORAL AS NEEDED
Qty: 15 TABLET | Refills: 3 | Status: SHIPPED | OUTPATIENT
Start: 2024-10-28 | End: 2024-11-27

## 2024-11-04 DIAGNOSIS — Z77.21 PERSONAL HISTORY OF EXPOSURE TO POTENTIALLY HAZARDOUS BODY FLUIDS: ICD-10-CM

## 2024-11-04 DIAGNOSIS — B00.1 RECURRENT COLD SORES: ICD-10-CM

## 2024-11-04 RX ORDER — EMTRICITABINE AND TENOFOVIR ALAFENAMIDE 200; 25 MG/1; MG/1
1 TABLET ORAL DAILY
Qty: 30 TABLET | Refills: 0 | Status: SHIPPED | OUTPATIENT
Start: 2024-11-04

## 2024-11-05 RX ORDER — VALACYCLOVIR HYDROCHLORIDE 1 G/1
TABLET, FILM COATED ORAL
Qty: 24 TABLET | Refills: 3 | OUTPATIENT
Start: 2024-11-05

## 2024-11-06 ENCOUNTER — DOCUMENTATION (OUTPATIENT)
Dept: IMMUNOLOGY | Facility: CLINIC | Age: 39
End: 2024-11-06
Payer: COMMERCIAL

## 2024-11-08 ENCOUNTER — OFFICE VISIT (OUTPATIENT)
Dept: URGENT CARE | Age: 39
End: 2024-11-08
Payer: COMMERCIAL

## 2024-11-08 ENCOUNTER — PATIENT MESSAGE (OUTPATIENT)
Dept: UROLOGY | Facility: CLINIC | Age: 39
End: 2024-11-08
Payer: COMMERCIAL

## 2024-11-08 VITALS
SYSTOLIC BLOOD PRESSURE: 116 MMHG | TEMPERATURE: 98.1 F | RESPIRATION RATE: 16 BRPM | OXYGEN SATURATION: 98 % | HEART RATE: 81 BPM | DIASTOLIC BLOOD PRESSURE: 83 MMHG

## 2024-11-08 DIAGNOSIS — M54.50 ACUTE MIDLINE LOW BACK PAIN WITHOUT SCIATICA: Primary | ICD-10-CM

## 2024-11-08 RX ORDER — PREDNISONE 20 MG/1
40 TABLET ORAL DAILY
Qty: 10 TABLET | Refills: 0 | Status: SHIPPED | OUTPATIENT
Start: 2024-11-08 | End: 2024-11-13

## 2024-11-08 RX ORDER — METHOCARBAMOL 750 MG/1
750 TABLET, FILM COATED ORAL 3 TIMES DAILY
Qty: 30 TABLET | Refills: 0 | Status: SHIPPED | OUTPATIENT
Start: 2024-11-08 | End: 2024-11-18

## 2024-11-08 RX ORDER — KETOROLAC TROMETHAMINE 30 MG/ML
30 INJECTION, SOLUTION INTRAMUSCULAR; INTRAVENOUS ONCE
Status: COMPLETED | OUTPATIENT
Start: 2024-11-08 | End: 2024-11-08

## 2024-11-08 ASSESSMENT — ENCOUNTER SYMPTOMS
CONSTITUTIONAL NEGATIVE: 1
BACK PAIN: 1

## 2024-11-08 NOTE — PATIENT INSTRUCTIONS
Take meds as prescribed.     Avoid squatting, bending at waist, kneeling, lifting greater than 15 pounds.       Rest, do not stay in one position for extended period of time.     Apply warm compresses and gentle massages 2-3 times daily     Follow up with PCP or Orthopedist if symptoms persist.     For severe or worsening symptoms, please go to ER

## 2024-11-08 NOTE — PROGRESS NOTES
"Subjective   Patient ID: Diony Marte \"Leandro\" is a 39 y.o. male. They present today with a chief complaint of Back Pain (Pt has lower back pain and said he carried a 300lb TV last week but no major injuries to cause pain ).    History of Present Illness  39-year-old presents clinic today with complaints of low back pain.  Patient states it is midline.  He states that he was carrying a large TV but did not feel an initial pain.  States it started bothering him the next day.  Denies radiation into the lower extremities.  Denies loss of bowel or bladder.  He has been trying ibuprofen, Advil with little relief.      Back Pain        Past Medical History  Allergies as of 11/08/2024 - Reviewed 11/08/2024   Allergen Reaction Noted    Midazolam Hallucinations 08/03/2023    Amoxicillin Rash 08/03/2023    Aripiprazole Unknown and Rash 08/03/2023    Celecoxib Rash 12/14/2023       (Not in a hospital admission)       Past Medical History:   Diagnosis Date    Body mass index (BMI) 27.0-27.9, adult 12/13/2018    BMI 27.0-27.9,adult    Contact with and (suspected) exposure to other viral communicable diseases 02/21/2019    Exposure to viral disease    Disorder of bone, unspecified 04/12/2021    Bone lesion    Dysphagia, pharyngoesophageal phase 02/06/2017    Pharyngoesophageal dysphagia    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    Recurrent cold sores    Low back pain, unspecified 12/17/2018    Lumbar pain    Otalgia, bilateral 04/10/2021    Otalgia of both ears    Other specified disorders of nose and nasal sinuses 10/12/2020    Tenderness over frontal sinus    Otitis media, unspecified, bilateral 03/11/2021    BOM (bilateral otitis media)    Otitis media, unspecified, right ear 03/07/2017    Acute otitis media, right    Pain in right knee 03/11/2016    Acute pain of right knee    Personal history of other diseases of the musculoskeletal system and connective tissue 11/30/2020    History of muscle pain    Personal " history of other diseases of the musculoskeletal system and connective tissue 11/06/2014    History of neck pain    Personal history of other diseases of the musculoskeletal system and connective tissue     History of radiculopathy    Personal history of other diseases of the respiratory system 04/10/2021    History of sore throat    Personal history of other diseases of the respiratory system 03/26/2018    History of acute sinusitis    Personal history of other diseases of the respiratory system 03/22/2018    History of sore throat    Personal history of other diseases of the respiratory system 04/03/2020    History of paranasal sinus congestion    Personal history of other diseases of the respiratory system     Personal history of asthma    Personal history of other endocrine, nutritional and metabolic disease 11/26/2019    History of hyperlipidemia    Personal history of other infectious and parasitic diseases 03/26/2018    History of viral infection    Personal history of other mental and behavioral disorders 11/20/2019    History of depression    Personal history of other specified conditions 11/30/2020    History of diarrhea    Personal history of other specified conditions 04/09/2021    History of dizziness    Personal history of peptic ulcer disease 02/27/2019    History of gastric ulcer    Personal history of peptic ulcer disease     Personal history of gastric ulcer    Unspecified otitis externa, right ear 03/22/2018    Right otitis externa       Past Surgical History:   Procedure Laterality Date    TONSILLECTOMY  11/06/2014    Tonsillectomy        reports that he has never smoked. He has never used smokeless tobacco. He reports current alcohol use of about 5.0 standard drinks of alcohol per week. He reports current drug use. Drug: Marijuana.    Review of Systems  Review of Systems   Constitutional: Negative.    Musculoskeletal:  Positive for back pain.                                  Objective    Vitals:     11/08/24 1459   BP: 116/83   BP Location: Left arm   Patient Position: Sitting   Pulse: 81   Resp: 16   Temp: 36.7 °C (98.1 °F)   SpO2: 98%     No LMP for male patient.    Physical Exam  Constitutional:       Appearance: Normal appearance.   Musculoskeletal:      Comments: No tenderness over lumbar spine, no SI joint tenderness, minor discomfort with bilateral straight leg raise   Neurological:      Mental Status: He is alert.         Procedures    Point of Care Test & Imaging Results from this visit  No results found for this visit on 11/08/24.   No results found.    Diagnostic study results (if any) were reviewed by Ayush Garíca PA-C.    Assessment/Plan   Allergies, medications, history, and pertinent labs/EKGs/Imaging reviewed by Ayush García PA-C.     Medical Decision Making  On physical examination there is no significant tenderness over the lumbar spine itself.  Straight leg raise does cause some mild aggravation.  This may be a muscular strain versus a herniated disc.  We did administer a Toradol injection.  Patient has tolerated NSAIDs in the past.  No known allergy to any.  Denies any kidney, heart or bleeding disorders.  I did start patient on prednisone to start tomorrow along with a muscle relaxer.  I did review the risks and benefits of these medications and patient did proceed.  I did provide him with an orthopedic referral in case symptoms do not improve.    Orders and Diagnoses  Diagnoses and all orders for this visit:  Acute midline low back pain without sciatica  -     ketorolac (Toradol) injection 30 mg  -     predniSONE (Deltasone) 20 mg tablet; Take 2 tablets (40 mg) by mouth once daily for 5 days.  -     methocarbamol (Robaxin) 750 mg tablet; Take 1 tablet (750 mg) by mouth 3 times a day for 10 days. Alternatively may take 1-2 tablets at night before bed if drowsiness occurs      Medical Admin Record      Patient disposition: Home    Electronically signed by Ayush García PA-C  3:25  PM

## 2024-12-11 ENCOUNTER — HOSPITAL ENCOUNTER (OUTPATIENT)
Dept: RADIOLOGY | Facility: HOSPITAL | Age: 39
Discharge: HOME | End: 2024-12-11
Payer: COMMERCIAL

## 2024-12-11 DIAGNOSIS — S43.432A LABRAL TEAR OF SHOULDER, LEFT, INITIAL ENCOUNTER: ICD-10-CM

## 2024-12-11 PROCEDURE — 2550000001 HC RX 255 CONTRASTS: Performed by: ORTHOPAEDIC SURGERY

## 2024-12-11 PROCEDURE — 73222 MRI JOINT UPR EXTREM W/DYE: CPT | Mod: LEFT SIDE | Performed by: RADIOLOGY

## 2024-12-11 PROCEDURE — 2500000004 HC RX 250 GENERAL PHARMACY W/ HCPCS (ALT 636 FOR OP/ED)

## 2024-12-11 PROCEDURE — 73040 CONTRAST X-RAY OF SHOULDER: CPT | Mod: LT

## 2024-12-11 PROCEDURE — A9575 INJ GADOTERATE MEGLUMI 0.1ML: HCPCS | Performed by: ORTHOPAEDIC SURGERY

## 2024-12-11 PROCEDURE — 73222 MRI JOINT UPR EXTREM W/DYE: CPT | Mod: LT

## 2024-12-11 RX ORDER — LIDOCAINE HYDROCHLORIDE 10 MG/ML
4 INJECTION, SOLUTION EPIDURAL; INFILTRATION; INTRACAUDAL; PERINEURAL ONCE
Status: DISCONTINUED | OUTPATIENT
Start: 2024-12-11 | End: 2024-12-12 | Stop reason: HOSPADM

## 2024-12-11 RX ORDER — LIDOCAINE HYDROCHLORIDE 10 MG/ML
INJECTION, SOLUTION INFILTRATION; PERINEURAL
Status: COMPLETED
Start: 2024-12-11 | End: 2024-12-11

## 2024-12-11 RX ORDER — GADOTERATE MEGLUMINE 376.9 MG/ML
0.1 INJECTION INTRAVENOUS
Status: COMPLETED | OUTPATIENT
Start: 2024-12-11 | End: 2024-12-11

## 2024-12-11 NOTE — POST-PROCEDURE NOTE
Interventional Radiology Brief Postprocedure Note    Attending: Crystal Wills    Assistant: N/A    Diagnosis: pain    Description of procedure: The risks, benefits and alternatives of the procedure were discussed  with the patient. The patient was given the chance to ask questions,  and all were answered prior to proceeding. At this time, written  informed consent was obtained.      The patient was placed in the supine position on the fluoroscopic  table and was prepped and draped in the usual sterile fashion. The  left shoulder joint was localized under fluoroscopy. Lidocainewas  used for local anesthesia. Under fluoroscopic guidance a 20 gauge  needle was inserted into the left shoulder joint. Approximately 10 ML  of a solution containing lidocaine, Omnipaque 240 iodinated contrast  and Multihance gadolinium contrast was injected into the left  shoulder joint. Initially the contrast appeared extra articular.  Needle repositioning was performed and small amount of  intra-articular contrast subsequently was also identified..      The patient tolerated the procedure well and no immediate  complication was noted.     Anesthesia:  Local    Complications: None    Estimated Blood Loss: none    Medications (Filter: Administrations occurring from 1646 to 1646 on 12/11/24) As of 12/11/24 1646      None          No specimens collected      See detailed result report with images in PACS.    The patient tolerated the procedure well without incident or complication and is in stable condition.

## 2024-12-16 ENCOUNTER — OFFICE VISIT (OUTPATIENT)
Dept: ORTHOPEDIC SURGERY | Facility: HOSPITAL | Age: 39
End: 2024-12-16
Payer: COMMERCIAL

## 2024-12-16 DIAGNOSIS — Z77.21 PERSONAL HISTORY OF EXPOSURE TO POTENTIALLY HAZARDOUS BODY FLUIDS: ICD-10-CM

## 2024-12-16 DIAGNOSIS — S43.432D TYPE 2 SUPERIOR LABRUM EXTENDING FROM ANTERIOR TO POSTERIOR (SLAP) LESION OF LEFT SHOULDER, SUBSEQUENT ENCOUNTER: ICD-10-CM

## 2024-12-16 PROCEDURE — 99213 OFFICE O/P EST LOW 20 MIN: CPT | Performed by: ORTHOPAEDIC SURGERY

## 2024-12-16 PROCEDURE — L3670 SO ACRO/CLAV CAN WEB PRE OTS: HCPCS | Performed by: ORTHOPAEDIC SURGERY

## 2024-12-16 RX ORDER — DOXYCYCLINE HYCLATE 100 MG
TABLET ORAL
Qty: 30 TABLET | Refills: 0 | Status: SHIPPED | OUTPATIENT
Start: 2024-12-16

## 2024-12-16 NOTE — PROGRESS NOTES
Patient here to review the MRI scan of his left shoulder.  The MRI scan was reviewed in detail it does show a SLAP lesion superiorly extending posteriorly.    SLAP lesion left shoulder we discussed options for treatment plan surgical nonsurgical options.  The potential benefits possible risks alternatives rehabilitation sequence were discussed the patient is interested in pursuing surgery the procedure would be: Left shoulder arthroscopy with repair of SLAP lesion        Patient would like to schedule sometime in February    This was dictated using voice recognition software and not corrected for grammatical or spelling errors.

## 2024-12-17 ENCOUNTER — PREP FOR PROCEDURE (OUTPATIENT)
Dept: ORTHOPEDIC SURGERY | Facility: HOSPITAL | Age: 39
End: 2024-12-17
Payer: COMMERCIAL

## 2024-12-17 ENCOUNTER — OFFICE VISIT (OUTPATIENT)
Dept: IMMUNOLOGY | Facility: CLINIC | Age: 39
End: 2024-12-17
Payer: COMMERCIAL

## 2024-12-17 VITALS
SYSTOLIC BLOOD PRESSURE: 105 MMHG | DIASTOLIC BLOOD PRESSURE: 74 MMHG | BODY MASS INDEX: 24.92 KG/M2 | OXYGEN SATURATION: 99 % | HEART RATE: 79 BPM | WEIGHT: 199.4 LBS

## 2024-12-17 DIAGNOSIS — Z77.21 PERSONAL HISTORY OF EXPOSURE TO POTENTIALLY HAZARDOUS BODY FLUIDS: ICD-10-CM

## 2024-12-17 DIAGNOSIS — S43.432D TYPE 2 SUPERIOR LABRUM EXTENDING FROM ANTERIOR TO POSTERIOR (SLAP) LESION OF LEFT SHOULDER, SUBSEQUENT ENCOUNTER: ICD-10-CM

## 2024-12-17 PROBLEM — S43.432A TYPE 2 SUPERIOR LABRUM EXTENDING FROM ANTERIOR TO POSTERIOR (SLAP) LESION OF LEFT SHOULDER: Status: ACTIVE | Noted: 2024-12-17

## 2024-12-17 LAB
ALBUMIN SERPL BCP-MCNC: 4.8 G/DL (ref 3.4–5)
ANION GAP SERPL CALC-SCNC: 12 MMOL/L (ref 10–20)
BUN SERPL-MCNC: 19 MG/DL (ref 6–23)
CALCIUM SERPL-MCNC: 9.4 MG/DL (ref 8.6–10.6)
CHLORIDE SERPL-SCNC: 102 MMOL/L (ref 98–107)
CO2 SERPL-SCNC: 28 MMOL/L (ref 21–32)
CREAT SERPL-MCNC: 1.14 MG/DL (ref 0.5–1.3)
EGFRCR SERPLBLD CKD-EPI 2021: 85 ML/MIN/1.73M*2
GLUCOSE SERPL-MCNC: 96 MG/DL (ref 74–99)
HIV 1+2 AB+HIV1 P24 AG SERPL QL IA: NONREACTIVE
PHOSPHATE SERPL-MCNC: 3 MG/DL (ref 2.5–4.9)
POTASSIUM SERPL-SCNC: 4.2 MMOL/L (ref 3.5–5.3)
SODIUM SERPL-SCNC: 138 MMOL/L (ref 136–145)
TREPONEMA PALLIDUM IGG+IGM AB [PRESENCE] IN SERUM OR PLASMA BY IMMUNOASSAY: NONREACTIVE

## 2024-12-17 PROCEDURE — 36415 COLL VENOUS BLD VENIPUNCTURE: CPT | Performed by: NURSE PRACTITIONER

## 2024-12-17 PROCEDURE — 1036F TOBACCO NON-USER: CPT | Performed by: NURSE PRACTITIONER

## 2024-12-17 PROCEDURE — 86780 TREPONEMA PALLIDUM: CPT | Performed by: NURSE PRACTITIONER

## 2024-12-17 PROCEDURE — 87389 HIV-1 AG W/HIV-1&-2 AB AG IA: CPT | Performed by: NURSE PRACTITIONER

## 2024-12-17 PROCEDURE — 80069 RENAL FUNCTION PANEL: CPT | Performed by: NURSE PRACTITIONER

## 2024-12-17 PROCEDURE — 87491 CHLMYD TRACH DNA AMP PROBE: CPT | Performed by: NURSE PRACTITIONER

## 2024-12-17 PROCEDURE — 99214 OFFICE O/P EST MOD 30 MIN: CPT | Performed by: NURSE PRACTITIONER

## 2024-12-17 ASSESSMENT — ENCOUNTER SYMPTOMS
ENDOCRINE NEGATIVE: 1
NEUROLOGICAL NEGATIVE: 1
CARDIOVASCULAR NEGATIVE: 1
GASTROINTESTINAL NEGATIVE: 1
RESPIRATORY NEGATIVE: 1
EYES NEGATIVE: 1
ACTIVITY CHANGE: 1
ALLERGIC/IMMUNOLOGIC NEGATIVE: 1
HEMATOLOGIC/LYMPHATIC NEGATIVE: 1

## 2024-12-17 NOTE — LETTER
12/17/24    Hay Robledo MD  [address]  [place] [address]      Dear Dr. Hay Robledo MD,    I am writing to confirm that your patient, Leandro Marte, received care in my office on 12/17/24. I have enclosed a summary of the care provided to Leandro for your reference.    Please contact me with any questions you may have regarding the visit.    Sincerely,         Bety Jain, APRN-CNP  [address]  [address]  [place] [place] [address]  [phone]    CC: No Recipients

## 2024-12-17 NOTE — PROGRESS NOTES
HIV PrEP Clinic Follow-up Visit:    Diony Marte is a 39 y.o. male being seen for PrEP for prevention of HIV infection.  Current PrEP therapy:  Descovy    Risk factors for HIV infection include: (select all that apply):  MSM    Regular condom use? Sometimes  Received treatment for STD since last seen? No   Has used doxy pep two times over the last 3 months   We will do a throat swab today.     Social drinks, some MJ vaping.   No tobacco use.      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    Recurrent cold sores    Low back pain, unspecified 12/17/2018    Lumbar pain    Otalgia, bilateral 04/10/2021    Otalgia of both ears    Other specified disorders of nose and nasal sinuses 10/12/2020    Tenderness over frontal sinus    Otitis media, unspecified, bilateral 03/11/2021    BOM (bilateral otitis media)    Otitis media, unspecified, right ear 03/07/2017    Acute otitis media, right    Pain in right knee 03/11/2016    Acute pain of right knee    Personal history of other diseases of the musculoskeletal system and connective tissue 11/30/2020    History of muscle pain    Personal history of other diseases of the musculoskeletal system and connective tissue 11/06/2014    History of neck pain    Personal history of other diseases of the musculoskeletal system and connective tissue     History of radiculopathy    Personal history of other diseases of the respiratory system 04/10/2021    History of sore throat    Personal history of other diseases of the respiratory system 03/26/2018    History of acute sinusitis     Personal history of other diseases of the respiratory system 03/22/2018    History of sore throat    Personal history of other diseases of the respiratory system 04/03/2020    History of paranasal sinus congestion    Personal history of other diseases of the respiratory system     Personal history of asthma    Personal history of other endocrine, nutritional and metabolic disease 11/26/2019    History of hyperlipidemia    Personal history of other infectious and parasitic diseases 03/26/2018    History of viral infection    Personal history of other mental and behavioral disorders 11/20/2019    History of depression    Personal history of other specified conditions 11/30/2020    History of diarrhea    Personal history of other specified conditions 04/09/2021    History of dizziness    Personal history of peptic ulcer disease 02/27/2019    History of gastric ulcer    Personal history of peptic ulcer disease     Personal history of gastric ulcer    Unspecified otitis externa, right ear 03/22/2018    Right otitis externa       Allergies  Allergies   Allergen Reactions    Midazolam Hallucinations    Amoxicillin Rash    Aripiprazole Unknown and Rash       Current Medications:    Current Outpatient Medications:     ALPRAZolam (Xanax) 0.5 mg tablet, Take 1 tablet (0.5 mg) by mouth once daily at bedtime., Disp: , Rfl:     atorvastatin (Lipitor) 40 mg tablet, Take 1 tablet (40 mg) by mouth once daily at bedtime., Disp: 90 tablet, Rfl: 1    buPROPion XL (Wellbutrin XL) 150 mg 24 hr tablet, Take 1 tablet (150 mg) by mouth early in the morning.., Disp: , Rfl:     Descovy 200-25 mg tablet, TAKE 1 TABLET DAILY, Disp: 30 tablet, Rfl: 0    doxycycline (Vibra-Tabs) 100 mg tablet, TAKE 2 TABLETS WITHIN 24 TO 72 HOURS AFTER UNPROTECTED SEXUAL ENCOUNTER, NOT TO BE TAKEN DAILY. Take with a large glass of water and do not lie down for 30 minutes after., Disp: 30 tablet, Rfl: 0    DULoxetine (Cymbalta) 20 mg DR capsule, Take 2 capsules  (40 mg) by mouth once daily. Do not crush or chew., Disp: , Rfl:     esomeprazole (NexIUM) 40 mg DR capsule, Take 1 capsule (40 mg) by mouth once daily in the morning. Take before meals., Disp: 90 capsule, Rfl: 1    finasteride (Proscar) 5 mg tablet, Take 1/4 tablet daily, Disp: 30 tablet, Rfl: 1    Gemtesa 75 mg tablet, TAKE 1 TABLET IN THE MOUTH OR THROAT ONCE DAILY, Disp: 90 tablet, Rfl: 3    lamoTRIgine (LaMICtal) 200 mg tablet, Take 2 tablets (400 mg) by mouth once daily., Disp: , Rfl:     lurasidone (Latuda) 60 mg tablet, 2 tablets (120 mg)., Disp: , Rfl:     valACYclovir (Valtrex) 1 gram tablet, TAKE 2 TABLETS EVERY 12 HOURS FOR 1 DAY AS NEEDED AT EARLY ONSET OF COLD SORES, Disp: 40 tablet, Rfl: 0    methocarbamol (Robaxin) 750 mg tablet, Take 1 tablet (750 mg) by mouth 3 times a day for 10 days. Alternatively may take 1-2 tablets at night before bed if drowsiness occurs, Disp: 30 tablet, Rfl: 0    tadalafil (Cialis) 10 mg tablet, Take 1 tablet (10 mg) by mouth if needed for erectile dysfunction., Disp: 15 tablet, Rfl: 3    Current Facility-Administered Medications:     ciprofloxacin (Cipro) tablet 500 mg, 500 mg, oral, Once, Celio Pierce MD MPH    Immunizations:  Immunization History   Administered Date(s) Administered    Flu vaccine (IIV4), preservative free *Check age/dose* 09/30/2022, 10/09/2023    Flu vaccine, quadrivalent, no egg protein, age 6 month or greater (FLUCELVAX) 09/11/2019    Flu vaccine, trivalent, preservative free, age 6 months and greater (Fluarix/Fluzone/Flulaval) 10/19/2015, 09/20/2024    HPV 9-valent vaccine (GARDASIL 9) 12/14/2021, 01/21/2022, 07/22/2022    Hep A, Unspecified 10/26/2015    Hepatitis A vaccine, age 19 years and greater (HAVRIX) 12/13/2018    Hepatitis A vaccine, pediatric/adolescent (HAVRIX, VAQTA) 10/26/2015    Hepatitis B vaccine, adult *Check Product/Dose* 01/21/2022, 05/05/2022, 07/22/2022    Influenza, Unspecified 11/12/2003, 11/02/2004, 11/18/2005,  11/22/2006, 10/20/2008, 10/19/2015, 09/12/2020    Influenza, seasonal, injectable 10/31/2014    MMR vaccine, subcutaneous (MMR II) 07/23/2021    Meningococcal MCV4, Unspecified 05/10/2006    Pfizer Gray Cap SARS-CoV-2 04/28/2022    Pfizer Purple Cap SARS-CoV-2 03/12/2021, 04/02/2021, 12/01/2021    Small pox and monkeypox vaccine (Jynneos) *check dose/route* 08/10/2022, 09/07/2022    Td vaccine, age 7 years and older (TDVAX) 09/30/2003    Tdap vaccine, age 7 year and older (BOOSTRIX, ADACEL) 10/26/2015, 01/21/2022    Vaccinia (smallpox) 09/07/2022       Review of systems  Review of Systems   Constitutional:  Positive for activity change.        Hurt L shoulder at the gym.    HENT: Negative.     Eyes: Negative.    Respiratory: Negative.     Cardiovascular: Negative.    Gastrointestinal: Negative.    Endocrine: Negative.    Genitourinary:         Has over active bladder.    Musculoskeletal:         L shoulder pain - apparently he tore something at the gym - and it requires surgery.   He is having this done on 1/30/25 - will be in a sling for 4-6 weeks, full recovery could take 12 weeks or so.       Some low back pain - but he basically lives with this.    Skin: Negative.    Allergic/Immunologic: Negative.    Neurological: Negative.    Hematological: Negative.    Psychiatric/Behavioral:          He is on a new regimen mentally and is in a better place.  Feels like he is well maintained.        PHYSICAL EXAMINATION:  Visit Vitals  /74 (BP Location: Right arm, Patient Position: Sitting)   Pulse 79   Wt 90.4 kg (199 lb 6.4 oz)   SpO2 99%   BMI 24.92 kg/m²   Smoking Status Never   BSA 2.19 m²       Physical Exam   Physical Exam  Vitals reviewed.   Constitutional:       Appearance: Normal appearance. He is normal weight.   HENT:      Head: Normocephalic.   Cardiovascular:      Rate and Rhythm: Normal rate and regular rhythm.      Heart sounds: Normal heart sounds.   Pulmonary:      Effort: Pulmonary effort is normal.  "     Breath sounds: Normal breath sounds.   Abdominal:      General: Bowel sounds are normal.      Palpations: Abdomen is soft.   Musculoskeletal:      Cervical back: Normal range of motion.      Comments: Injured L shoulder - surgery scheduled for 1/30/25.    Skin:     General: Skin is warm and dry.      Capillary Refill: Capillary refill takes less than 2 seconds.   Neurological:      Mental Status: He is alert and oriented to person, place, and time.   Psychiatric:         Mood and Affect: Mood normal.         Behavior: Behavior normal.       Pertinent data review:  HIV 1/2 Antigen/Antibody Screen with Reflex to Confirmation   Date Value Ref Range Status   09/20/2024 Nonreactive Nonreactive Final     Syphilis Total Ab   Date Value Ref Range Status   09/20/2024 Nonreactive Nonreactive Final     Comment:     No significant level of Treponema pallidum antibody detected.   Repeat testing in 2 to 4 weeks may be considered if early   infection or incubating syphilis infection is suspected.     No results found for: \"VDRLCSF\"  No results found for: \"RPR\"  Hepatitis C Ab   Date Value Ref Range Status   02/22/2019 NON-REACTIVE NONREACTIVE Final     Comment:      Patients receiving more than 5 mg/day of biotin may have interference   in test results.  A sample should be taken no sooner than eight hours   after  previous dose. Contact [phone] for additional information.        Hepatitis B Surface Ag   Date Value Ref Range Status   02/22/2019 NONREACTIVE NONREACTIVE Final     Comment:      Patients receiving more than 5 mg/day of biotin may have interference   in test results.  A sample should be taken no sooner than eight hours   after  previous dose. Contact [phone] for additional information.        Hepatitis B Surface Ab   Date Value Ref Range Status   08/08/2023 100.4 <10 mIU/mL Final     Comment:     INTERPRETIVE CRITERIA:  <10 mIU/mL....NONREACTIVE    >=10 mIU/mL...REACTIVE   .   Biotin interference may " "cause falsely decreased results.   Patients taking a Biotin dose of up to 5 mg/day should   refrain from taking Biotin for 24 hours before sample   collection. Providers may contact their local laboratory   for further information.       No results found for: \"HAVTO\"  Glucose   Date Value Ref Range Status   09/20/2024 84 74 - 99 mg/dL Final     Sodium   Date Value Ref Range Status   09/20/2024 137 136 - 145 mmol/L Final     Potassium   Date Value Ref Range Status   09/20/2024 3.9 3.5 - 5.3 mmol/L Final     Chloride   Date Value Ref Range Status   09/20/2024 99 98 - 107 mmol/L Final     Anion Gap   Date Value Ref Range Status   09/20/2024 12 10 - 20 mmol/L Final     Urea Nitrogen   Date Value Ref Range Status   09/20/2024 16 6 - 23 mg/dL Final     Creatinine   Date Value Ref Range Status   09/20/2024 1.16 0.50 - 1.30 mg/dL Final     eGFR   Date Value Ref Range Status   09/20/2024 83 >60 mL/min/1.73m*2 Final     Comment:     Calculations of estimated GFR are performed using the 2021 CKD-EPI Study Refit equation without the race variable for the IDMS-Traceable creatinine methods.  https://jasn.asnjournals.org/content/early/2021/09/22/ASN.5593126495     Calcium   Date Value Ref Range Status   09/20/2024 9.4 8.6 - 10.6 mg/dL Final     Phosphorus   Date Value Ref Range Status   09/20/2024 2.7 2.5 - 4.9 mg/dL Final     Comment:     The performance characteristics of phosphorus testing in heparinized plasma have been validated by the individual  laboratory site where testing is performed. Testing on heparinized plasma is not approved by the FDA; however, such approval is not necessary.     Albumin   Date Value Ref Range Status   09/20/2024 4.8 3.4 - 5.0 g/dL Final       Assessment and Plan:  Diony Marte is a 39 y.o.male seen today for evaluation of appropriateness for continuation of  PrEP therapy as they continue to be at greater risk for acquiring HIV infection.  HIV Ag/Ab, Syphilis testing, and renal function " will be collected today.  GC NAAT testing will be obtained from 1 site: , throat.  If HIV testing negative, prescription for PrEP will be renewed.    Getting routine bloods and throat swab today.  Labs again in 3 months - see him again in 6 months.   Problem List Items Addressed This Visit       Personal history of exposure to potentially hazardous body fluids    Relevant Orders    C. trachomatis / N. gonorrhoeae, Amplified    C. trachomatis / N. gonorrhoeae, Amplified    HIV 1/2 Antigen/Antibody Screen with Reflex to Confirmation    Renal Function Panel    Syphilis Screen with Reflex   Thanks for coming in to see us today.   Have safe holidays and good luck with the surgery.   See you next year.     Bety Jain, APRPAPA-CNP

## 2024-12-18 DIAGNOSIS — Z77.21 PERSONAL HISTORY OF EXPOSURE TO POTENTIALLY HAZARDOUS BODY FLUIDS: ICD-10-CM

## 2024-12-18 LAB
C TRACH RRNA SPEC QL NAA+PROBE: NEGATIVE
C TRACH RRNA SPEC QL NAA+PROBE: NEGATIVE
N GONORRHOEA DNA SPEC QL PROBE+SIG AMP: NEGATIVE
N GONORRHOEA DNA SPEC QL PROBE+SIG AMP: NEGATIVE

## 2024-12-18 RX ORDER — EMTRICITABINE AND TENOFOVIR ALAFENAMIDE 200; 25 MG/1; MG/1
1 TABLET ORAL DAILY
Qty: 30 TABLET | Refills: 2 | Status: SHIPPED | OUTPATIENT
Start: 2024-12-18

## 2024-12-20 RX ORDER — OXYBUTYNIN CHLORIDE 5 MG/1
TABLET ORAL
Refills: 0 | OUTPATIENT
Start: 2024-12-20

## 2024-12-31 ENCOUNTER — PRE-ADMISSION TESTING (OUTPATIENT)
Dept: PREADMISSION TESTING | Facility: HOSPITAL | Age: 39
End: 2024-12-31
Payer: COMMERCIAL

## 2024-12-31 VITALS
BODY MASS INDEX: 24.71 KG/M2 | OXYGEN SATURATION: 97 % | SYSTOLIC BLOOD PRESSURE: 116 MMHG | DIASTOLIC BLOOD PRESSURE: 76 MMHG | WEIGHT: 198.7 LBS | HEIGHT: 75 IN | RESPIRATION RATE: 14 BRPM | HEART RATE: 78 BPM | TEMPERATURE: 97.9 F

## 2024-12-31 DIAGNOSIS — S43.432D TYPE 2 SUPERIOR LABRUM EXTENDING FROM ANTERIOR TO POSTERIOR (SLAP) LESION OF LEFT SHOULDER, SUBSEQUENT ENCOUNTER: ICD-10-CM

## 2024-12-31 DIAGNOSIS — Z01.818 PREOP TESTING: Primary | ICD-10-CM

## 2024-12-31 LAB
BASOPHILS # BLD AUTO: 0.03 X10*3/UL (ref 0–0.1)
BASOPHILS NFR BLD AUTO: 0.6 %
EOSINOPHIL # BLD AUTO: 0.1 X10*3/UL (ref 0–0.7)
EOSINOPHIL NFR BLD AUTO: 1.9 %
ERYTHROCYTE [DISTWIDTH] IN BLOOD BY AUTOMATED COUNT: 11.9 % (ref 11.5–14.5)
HCT VFR BLD AUTO: 38.4 % (ref 41–52)
HGB BLD-MCNC: 13 G/DL (ref 13.5–17.5)
IMM GRANULOCYTES # BLD AUTO: 0.01 X10*3/UL (ref 0–0.7)
IMM GRANULOCYTES NFR BLD AUTO: 0.2 % (ref 0–0.9)
LYMPHOCYTES # BLD AUTO: 2.63 X10*3/UL (ref 1.2–4.8)
LYMPHOCYTES NFR BLD AUTO: 50.9 %
MCH RBC QN AUTO: 31.2 PG (ref 26–34)
MCHC RBC AUTO-ENTMCNC: 33.9 G/DL (ref 32–36)
MCV RBC AUTO: 92 FL (ref 80–100)
MONOCYTES # BLD AUTO: 0.49 X10*3/UL (ref 0.1–1)
MONOCYTES NFR BLD AUTO: 9.5 %
NEUTROPHILS # BLD AUTO: 1.91 X10*3/UL (ref 1.2–7.7)
NEUTROPHILS NFR BLD AUTO: 36.9 %
NRBC BLD-RTO: ABNORMAL /100{WBCS}
PLATELET # BLD AUTO: 281 X10*3/UL (ref 150–450)
RBC # BLD AUTO: 4.17 X10*6/UL (ref 4.5–5.9)
WBC # BLD AUTO: 5.2 X10*3/UL (ref 4.4–11.3)

## 2024-12-31 PROCEDURE — 85025 COMPLETE CBC W/AUTO DIFF WBC: CPT

## 2024-12-31 PROCEDURE — 99204 OFFICE O/P NEW MOD 45 MIN: CPT | Performed by: NURSE PRACTITIONER

## 2024-12-31 PROCEDURE — 36415 COLL VENOUS BLD VENIPUNCTURE: CPT

## 2024-12-31 ASSESSMENT — DUKE ACTIVITY SCORE INDEX (DASI)
CAN YOU PARTICIPATE IN MODERATE RECREATIONAL ACTIVITIES LIKE GOLF, BOWLING, DANCING, DOUBLES TENNIS OR THROWING A BASEBALL OR FOOTBALL: YES
CAN YOU WALK INDOORS, SUCH AS AROUND YOUR HOUSE: YES
CAN YOU WALK A BLOCK OR TWO ON LEVEL GROUND: YES
CAN YOU HAVE SEXUAL RELATIONS: YES
TOTAL_SCORE: 50.7
CAN YOU DO YARD WORK LIKE RAKING LEAVES, WEEDING OR PUSHING A MOWER: YES
CAN YOU PARTICIPATE IN STRENOUS SPORTS LIKE SWIMMING, SINGLES TENNIS, FOOTBALL, BASKETBALL, OR SKIING: NO
DASI METS SCORE: 9
CAN YOU TAKE CARE OF YOURSELF (EAT, DRESS, BATHE, OR USE TOILET): YES
CAN YOU DO MODERATE WORK AROUND THE HOUSE LIKE VACUUMING, SWEEPING FLOORS OR CARRYING GROCERIES: YES
CAN YOU RUN A SHORT DISTANCE: YES
CAN YOU DO LIGHT WORK AROUND THE HOUSE LIKE DUSTING OR WASHING DISHES: YES
CAN YOU DO HEAVY WORK AROUND THE HOUSE LIKE SCRUBBING FLOORS OR LIFTING AND MOVING HEAVY FURNITURE: YES
CAN YOU CLIMB A FLIGHT OF STAIRS OR WALK UP A HILL: YES

## 2024-12-31 ASSESSMENT — PAIN DESCRIPTION - DESCRIPTORS: DESCRIPTORS: ACHING

## 2024-12-31 ASSESSMENT — PAIN - FUNCTIONAL ASSESSMENT: PAIN_FUNCTIONAL_ASSESSMENT: 0-10

## 2024-12-31 ASSESSMENT — PAIN SCALES - GENERAL: PAINLEVEL_OUTOF10: 4

## 2024-12-31 NOTE — PREPROCEDURE INSTRUCTIONS
Medication List            Accurate as of December 31, 2024  9:03 AM. Always use your most recent med list.                ALPRAZolam 0.5 mg tablet  Commonly known as: Xanax  Medication Adjustments for Surgery: Take/Use as prescribed     atorvastatin 40 mg tablet  Commonly known as: Lipitor  Take 1 tablet (40 mg) by mouth once daily at bedtime.  Medication Adjustments for Surgery: Take/Use as prescribed     buPROPion  mg 24 hr tablet  Commonly known as: Wellbutrin XL  Medication Adjustments for Surgery: Take/Use as prescribed     Descovy 200-25 mg tablet  Generic drug: emtricitabine-tenofovir alafen  Take 1 tablet by mouth once daily.  Medication Adjustments for Surgery: Take/Use as prescribed     doxycycline 100 mg tablet  Commonly known as: Vibra-Tabs  TAKE 2 TABLETS WITHIN 24 TO 72 HOURS AFTER UNPROTECTED SEXUAL ENCOUNTER, NOT TO BE TAKEN DAILY. Take with a large glass of water and do not lie down for 30 minutes after.  Medication Adjustments for Surgery: Take/Use as prescribed     DULoxetine 20 mg DR capsule  Commonly known as: Cymbalta  Take 2 capsules (40 mg) by mouth once daily. Do not crush or chew.  Medication Adjustments for Surgery: Take/Use as prescribed     esomeprazole 40 mg DR capsule  Commonly known as: NexIUM  Take 1 capsule (40 mg) by mouth once daily in the morning. Take before meals.  Medication Adjustments for Surgery: Take/Use as prescribed     finasteride 5 mg tablet  Commonly known as: Proscar  Take 1/4 tablet daily  Medication Adjustments for Surgery: Take/Use as prescribed     Gemtesa 75 mg tablet  Generic drug: vibegron  TAKE 1 TABLET IN THE MOUTH OR THROAT ONCE DAILY  Medication Adjustments for Surgery: Take/Use as prescribed     lamoTRIgine 200 mg tablet  Commonly known as: LaMICtal  Medication Adjustments for Surgery: Take/Use as prescribed     lurasidone 60 mg tablet  Commonly known as: Latuda  Medication Adjustments for Surgery: Take/Use as prescribed     methocarbamol 750 mg  tablet  Commonly known as: Robaxin  Take 1 tablet (750 mg) by mouth 3 times a day for 10 days. Alternatively may take 1-2 tablets at night before bed if drowsiness occurs  Medication Adjustments for Surgery: Take/Use as prescribed     tadalafil 10 mg tablet  Commonly known as: Cialis  Take 1 tablet (10 mg) by mouth if needed for erectile dysfunction.  Medication Adjustments for Surgery: Take last dose 3 days before surgery  Notes to patient: Last dose preoperatively on 1/26/25     valACYclovir 1 gram tablet  Commonly known as: Valtrex  TAKE 2 TABLETS EVERY 12 HOURS FOR 1 DAY AS NEEDED AT EARLY ONSET OF COLD SORES  Medication Adjustments for Surgery: Take/Use as prescribed            NPO Instructions:     Do not eat any food after midnight the night before your surgery/procedure.  You may have clear liquids until TWO hours before surgery/procedure. This includes water, black tea/coffee, (no milk or cream) apple juice and electrolyte drinks (Gatorade).  You may chew gum up to TWO hours before your surgery/procedure.     Additional Instructions:      Seven/Six Days before Surgery:  Review your medication instructions, stop indicated medications  Five Days before Surgery:  Review your medication instructions, stop indicated medications  Three Days before Surgery:  Review your medication instructions, stop indicated medications  The Day before Surgery:  No smoking or alcohol use 24 hours before surgery  Review your medication instructions, stop indicated medications  You will be contacted regarding the time of your arrival to facility and surgery time  Do not eat any food after Midnight  Day of Surgery:  Review your medication instructions, take indicated medications  If you have diabetes, please check your fasting blood sugar upon awakening.  If fasting blood sugar is <80 mg/dl, drink 100 ml of apple juice, time limit of 2 hours before  You may have clear liquids until TWO hours before surgery/procedure.  This includes  water, black tea/coffee, (no milk or cream) apple juice and electrolyte drinks (Gatorade)  You may chew gum up to TWO hours before your surgery/procedure  Wear  comfortable loose fitting clothing  Do not use moisturizers, creams, lotions or perfume  All jewelry and valuables should be left at home     CONTACT SURGEON'S OFFICE IF YOU DEVELOP:  * Fever = 100.4 F   * New respiratory symptoms (e.g. cough, shortness of breath, respiratory distress, sore throat)  * Recent loss of taste or smell  *Flu like symptoms such as headache, fatigue or gastrointestinal symptoms  * You develop any open sores, shingles, burning or painful urination   AND/OR:  * You no longer wish to have the surgery.  * Any other personal circumstances change that may lead to the need to cancel or defer this surgery.  *You were admitted to any hospital within one week of your planned procedure.     SMOKING:  *Quitting smoking can make a huge difference to your health and recovery from surgery.    *If you need help with quitting, call 6-742-QUIT-NOW.     THE DAY BEFORE SURGERY:  *Do not eat any food after midnight the night before your surgery.   *You may have up to TEN OUNCES of clear liquids until TWO hours before your instructed ARRIVAL TIME to hospital. This includes water, black tea/coffee, (no milk or cream) apple juice, clear broth and electrolyte drinks (Gatorade). Please avoid clear liquids that are red in color.   *You may chew gum/mints up to TWO hours before your surgery/procedure.     SURGICAL TIME:  *You will be contacted between 2 p.m. and 3 p.m. the business day before your surgery with your arrival time.  *If you haven't received a call by 3pm, call [phone]  *Scheduled surgery times may change and you will be notified if this occurs-check your personal voicemail for any updates.     ON THE MORNING OF SURGERY:  *Wear comfortable, loose fitting clothing.   *Do not use moisturizers, creams, lotions or perfume.  *All jewelry and  valuables should be left at home.  *Prosthetic devices such as contact lenses, hearing aids, dentures, eyelash extensions, hairpins and body piercing must be removed before surgery.     BRING WITH YOU:  *Photo ID and insurance card  *Current list of medications and allergies  *Pacemaker/Defibrillator/Heart stent cards  *CPAP machine and mask  *Slings/splints/crutches  *Copy of your complete Advanced Directive/DHPOA-if applicable  *Neurostimulator implant remote     PARKING AND ARRIVAL:  *Check in at the Main Entrance desk and let them know you are here for surgery.     IF YOU ARE HAVING OUTPATIENT/SAME DAY SURGERY:  *A responsible adult MUST accompany you at the time of discharge and stay with you for 24 hours after your surgery.  *You may NOT drive yourself home after surgery.  *You may use a taxi or ride sharing service ([employer], Uber) to return home ONLY if you are accompanied by a friend or family member.  *Instructions for resuming your medications will be provided by your surgeon.     Thank you for coming to Pre Admission testing.      If I have prescribed medication please don't forget to  at your pharmacy.      Any questions about today's visit call [phone] and leave a message in the general mailbox.     Patient instructed to ambulate as soon as possible postoperatively to decrease thromboembolic risk.     PLACIDO Haney     Thank you for visiting [facility].  If you have any changes to your health condition, please call the surgeons office to alert them and give them details of your symptoms.        Preoperative Fasting Guidelines     Why must I stop eating and drinking near surgery time?  With sedation, food or liquid in your stomach can enter your lungs causing serious complications  Increases nausea and vomiting     When do I need to stop eating and drinking before my surgery?  Do not eat any food after midnight the night before your surgery/procedure.  You may  have up to TEN ounces of clear liquid until TWO hours before your instructed arrival time to the hospital.  This includes water, black tea/coffee, (no milk or cream) apple juice, and electrolyte drinks (Gatorade)  You may chew gum until TWO hours before your surgery/procedure        Additional Instructions:      The Day before Surgery:  -Review your medication instructions, stop indicated medications  -You will be contacted in the evening regarding the time of your arrival to facility and surgery time     Day of Surgery:  -Review your medication instructions, take indicated medications  -Wear comfortable loose fitting clothing  -Do not use moisturizers, creams, lotions or perfume  -All jewelry and valuables should be left at home                   Preoperative Brain Exercises     What are brain exercises?  A brain exercise is any activity that engages your thinking (cognitive) skills.     What types of activities are considered brain exercises?  Jigsaw puzzles, crossword puzzles, word jumble, memory games, word search, and many more.  Many can be found free online or on your phone via a mobile jean [employer]ude.     Why should I do brain exercises before my surgery?  More recent research has shown brain exercise before surgery can lower the risk of postoperative delirium (confusion) which can be especially important for older adults.  Patients who did brain exercises for 5 to 10 hours the days before surgery, cut their risk of postoperative delirium in half up to 1 week after surgery.                         [facility]     Preoperative Deep Breathing Exercises     Why it is important to do deep breathing exercises before my surgery?  Deep breathing exercises strengthen your breathing muscles.  This helps you to recover after your surgery and decreases the chance of breathing complications.        How are the deep breathing exercises done?  Sit straight with your back supported.  Breathe in deeply and  slowly through your nose. Your lower rib cage should expand and your abdomen may move forward.  Hold that breath for 3 to 5 seconds.  Breathe out through pursed lips, slowly and completely.  Rest and repeat 10 times every hour while awake.  Rest longer if you become dizzy or lightheaded.                      [facility]     Preoperative Deep Breathing Exercises     Why it is important to do deep breathing exercises before my surgery?  Deep breathing exercises strengthen your breathing muscles.  This helps you to recover after your surgery and decreases the chance of breathing complications.        How are the deep breathing exercises done?  Sit straight with your back supported.  Breathe in deeply and slowly through your nose. Your lower rib cage should expand and your abdomen may move forward.  Hold that breath for 3 to 5 seconds.  Breathe out through pursed lips, slowly and completely.  Rest and repeat 10 times every hour while awake.  Rest longer if you become dizzy or lightheaded.        Patient Information: Incentive Spirometer  What is an incentive spirometer?  An incentive spirometer is a device used before and after surgery to “exercise” your lungs.  It helps you to take deeper breaths to expand your lungs.  Below is an example of a basic incentive spirometer.  The device you receive may differ slightly but they all function the same.    Why do I need to use an incentive spirometer?  Using your incentive spirometer prepares your lungs for surgery and helps prevent lung problems after surgery.  How do I use my incentive spirometer?  When you're using your incentive spirometer, make sure to breathe through your mouth. If you breathe through your nose, the incentive spirometer won't work properly. You can hold your nose if you have trouble.  If you feel dizzy at any time, stop and rest. Try again at a later time.  Follow the steps below:  Set up your incentive spirometer, expand the  flexible tubing and connect to the outlet.  Sit upright in a chair or bed. Hold the incentive spirometer at eye level.   Put the mouthpiece in your mouth and close your lips tightly around it. Slowly breathe out (exhale) completely.  Breathe in (inhale) slowly through your mouth as deeply as you can. As you take a breath, you will see the piston rise inside the large column. While the piston rises, the indicator should move upwards. It should stay in between the 2 arrows (see Figure).  Try to get the piston as high as you can, while keeping the indicator between the arrows.   If the indicator doesn't stay between the arrows, you're breathing either too fast or too slow.  When you get it as high as you can, hold your breath for 10 seconds, or as long as possible. While you're holding your breath, the piston will slowly fall to the base of the spirometer.  Once the piston reaches the bottom of the spirometer, breathe out slowly through your mouth. Rest for a few seconds.  Repeat 10 times. Try to get the piston to the same level with each breath.  Repeat every hour while awake  You can carefully clean the outside of the mouthpiece with an alcohol wipe or soap and water.       Patient and Family Education             Ways You Can Help Prevent Blood Clots                    This handout explains some simple things you can do to help prevent blood clots.      Blood clots are blockages that can form in the body's veins. When a blood clot forms in your deep veins, it may be called a deep vein thrombosis, or DVT for short. Blood clots can happen in any part of the body where blood flows, but they are most common in the arms and legs. If a piece of a blood clot breaks free and travels to the lungs, it is called a pulmonary embolus (PE). A PE can be a very serious problem.         Being in the hospital or having surgery can raise your chances of getting a blood clot because you may not be well enough to move around as much as  you normally do.         Ways you can help prevent blood clots in the hospital           Wearing SCDs. SCDs stands for Sequential Compression Devices.   SCDs are special sleeves that wrap around your legs  They attach to a pump that fills them with air to gently squeeze your legs every few minutes.   This helps return the blood in your legs to your heart.   SCDs should only be taken off when walking or bathing.   SCDs may not be comfortable, but they can help save your life.                                            Wearing compression stockings - if your doctor orders them. These special snug fitting stockings gently squeeze your legs to help blood flow.       Walking. Walking helps move the blood in your legs.   If your doctor says it is ok, try walking the halls at least   5 times a day. Ask us to help you get up, so you don't fall.      Taking any blood thinning medicines your doctor orders.        Page 1 of 2            [facility]; 3/23   Ways you can help prevent blood clots at home         Wearing compression stockings - if your doctor orders them. ? Walking - to help move the blood in your legs.       Taking any blood thinning medicines your doctor orders.      Signs of a blood clot or PE        Tell your doctor or nurse know right away if you have of the problems listed below.    If you are at home, seek medical care right away. Call 911 for chest pain or problems breathing.                Signs of a blood clot (DVT) - such as pain,  swelling, redness or warmth in your arm or leg      Signs of a pulmonary embolism (PE) - such as chest pain or feeling short of breath

## 2024-12-31 NOTE — CPM/PAT H&P
"CPM/PAT Evaluation       Name: Diony Marte (Diony Marte \"Leandro\")  /Age: 1985/39 y.o.     In-Person       Chief Complaint: Type 2 superior labrum extending from anterior to posterior (SLAP) lesion of left shoulder, subsequent encounter     HPI  Patient is an alert and oriented 39 year old male scheduled for a LEFT Shoulder arthroscopy with repair SLAP lesion on 25 with Dr. Mejia for Type 2 superior labrum extending from anterior to posterior (SLAP) lesion of left shoulder, subsequent encounter . PMHX includes HLD, GERD, anxiety, GERARDO. [place] [facility] [place] perioperative risk stratification and optimization    Past Medical History:   Diagnosis Date    Body mass index (BMI) 27.0-27.9, adult 2018    BMI 27.0-27.9,adult    Contact with and (suspected) exposure to other viral communicable diseases 2019    Exposure to viral disease    Disorder of bone, unspecified 2021    Bone lesion    Dysphagia, pharyngoesophageal phase 2017    Pharyngoesophageal dysphagia    Herpesviral vesicular dermatitis 2019    Recurrent cold sores    Low back pain, unspecified 2018    Lumbar pain    Otalgia, bilateral 04/10/2021    Otalgia of both ears    Other specified disorders of nose and nasal sinuses 10/12/2020    Tenderness over frontal sinus    Otitis media, unspecified, bilateral 2021    BOM (bilateral otitis media)    Otitis media, unspecified, right ear 2017    Acute otitis media, right    Pain in right knee 2016    Acute pain of right knee    Personal history of other diseases of the musculoskeletal system and connective tissue 2020    History of muscle pain    Personal history of other diseases of the musculoskeletal system and connective tissue 2014    History of neck pain    Personal history of other diseases of the musculoskeletal system and connective tissue     History of radiculopathy    Personal history of other diseases of " the respiratory system 04/10/2021    History of sore throat    Personal history of other diseases of the respiratory system 03/26/2018    History of acute sinusitis    Personal history of other diseases of the respiratory system 03/22/2018    History of sore throat    Personal history of other diseases of the respiratory system 04/03/2020    History of paranasal sinus congestion    Personal history of other diseases of the respiratory system     Personal history of asthma    Personal history of other endocrine, nutritional and metabolic disease 11/26/2019    History of hyperlipidemia    Personal history of other infectious and parasitic diseases 03/26/2018    History of viral infection    Personal history of other mental and behavioral disorders 11/20/2019    History of depression    Personal history of other specified conditions 11/30/2020    History of diarrhea    Personal history of other specified conditions 04/09/2021    History of dizziness    Personal history of peptic ulcer disease 02/27/2019    History of gastric ulcer    Personal history of peptic ulcer disease     Personal history of gastric ulcer    Unspecified otitis externa, right ear 03/22/2018    Right otitis externa       Past Surgical History:   Procedure Laterality Date    TONSILLECTOMY  11/06/2014    Tonsillectomy       Patient  reports being sexually active and has had partner(s) who are male.    Family History   Problem Relation Name Age of Onset    Hyperlipidemia Other      Breast cancer Other      Diabetes Other      Neuropathy Other      Depression Other      Alcohol abuse Other      Colon cancer Other      Autism spectrum disorder Other         Allergies   Allergen Reactions    Midazolam Hallucinations    Amoxicillin Rash    Aripiprazole Unknown and Rash       Prior to Admission medications    Medication Sig Start Date End Date Taking? Authorizing Provider   ALPRAZolam (Xanax) 0.5 mg tablet Take 1 tablet (0.5 mg) by mouth once daily at  bedtime.    Historical Provider, MD   atorvastatin (Lipitor) 40 mg tablet Take 1 tablet (40 mg) by mouth once daily at bedtime. 10/10/24 4/8/25  Hay Robledo MD   buPROPion XL (Wellbutrin XL) 150 mg 24 hr tablet Take 1 tablet (150 mg) by mouth early in the morning.. 6/6/24   Historical Provider, MD   Descovy 200-25 mg tablet Take 1 tablet by mouth once daily. 12/18/24   PLACIDO Alberto   doxycycline (Vibra-Tabs) 100 mg tablet TAKE 2 TABLETS WITHIN 24 TO 72 HOURS AFTER UNPROTECTED SEXUAL ENCOUNTER, NOT TO BE TAKEN DAILY. Take with a large glass of water and do not lie down for 30 minutes after. 12/16/24   PLACIDO Alberto   DULoxetine (Cymbalta) 20 mg DR capsule Take 2 capsules (40 mg) by mouth once daily. Do not crush or chew. 10/10/24 10/10/25  Hay Robledo MD   esomeprazole (NexIUM) 40 mg DR capsule Take 1 capsule (40 mg) by mouth once daily in the morning. Take before meals. 10/10/24   Hay Robledo MD   finasteride (Proscar) 5 mg tablet Take 1/4 tablet daily 10/10/24   Hay Robledo MD   Gemtesa 75 mg tablet TAKE 1 TABLET IN THE MOUTH OR THROAT ONCE DAILY 7/24/24   Bruce Lockwood MD   lamoTRIgine (LaMICtal) 200 mg tablet Take 2 tablets (400 mg) by mouth once daily. 1/18/17   Historical Provider, MD   lurasidone (Latuda) 60 mg tablet 2 tablets (120 mg). 10/27/23   Historical Provider, MD   methocarbamol (Robaxin) 750 mg tablet Take 1 tablet (750 mg) by mouth 3 times a day for 10 days. Alternatively may take 1-2 tablets at night before bed if drowsiness occurs 11/8/24 11/18/24  Ayush García PA-C   tadalafil (Cialis) 10 mg tablet Take 1 tablet (10 mg) by mouth if needed for erectile dysfunction. 10/28/24 11/27/24  Celio Pierce MD MPH   valACYclovir (Valtrex) 1 gram tablet TAKE 2 TABLETS EVERY 12 HOURS FOR 1 DAY AS NEEDED AT EARLY ONSET OF COLD SORES 8/23/24   Hay Robledo MD      Review of Systems   Constitutional: Negative for chills, decreased appetite,  diaphoresis, fever and malaise/fatigue.   Eyes:  Negative for blurred vision and double vision.   Cardiovascular:  Negative for chest pain, claudication, cyanosis, dyspnea on exertion, irregular heartbeat, leg swelling, near-syncope and palpitations.   Respiratory:  Negative for cough, hemoptysis, shortness of breath and wheezing.    Endocrine: Negative for cold intolerance, heat intolerance, polydipsia, polyphagia and polyuria.   Gastrointestinal:  Negative for abdominal pain, constipation, diarrhea, dysphagia, nausea and vomiting.   Genitourinary:  Negative for bladder incontinence, dysuria, hematuria, incomplete emptying, nocturia, frequency, pelvic pain and urgency.   Neurological:  Negative for headaches, light-headedness, paresthesias, sensory change and weakness.   Psychiatric/Behavioral:  Negative for altered mental status.    Musculoskeletal: Positive for myalgias, arthralgias     Vitals and nursing note reviewed.     Physical exam  Constitutional:       Appearance: Normal appearance. He is Normal.   HENT:      Head: Normocephalic and atraumatic.      Mouth/Throat:      Mouth: Mucous membranes are moist.      Pharynx: Oropharynx is clear.   Eyes:      Extraocular Movements: Extraocular movements intact.      Conjunctiva/sclera: Conjunctivae normal.      Pupils: Pupils are equal, round, and reactive to light.   Cardiovascular:      PMI at left midclavicular line. Normal rate. Regular rhythm. Normal S1. Normal S2.       Murmurs: There is no murmur.      No gallop.  No click. No rub.       No audible carotid bruit     No lower extremity edema on exam  Pulmonary:      Effort: Pulmonary effort is normal.      Breath sounds: Normal breath sounds.   Abdominal:      General: Abdomen is flat. Bowel sounds are normal.      Palpations: Abdomen is soft and non-tender  Musculoskeletal:      Cervical back: Normal range of motion and neck supple.   Skin:     General: Skin is warm and dry.      Capillary Refill: Capillary  refill takes less than 2 seconds.   Neurological:      General: No focal deficit present.      Mental Status: He is alert and oriented to person, place, and time. Mental status is at baseline.   Psychiatric:         Mood and Affect: Mood normal.         Behavior: Behavior normal.         Thought Content: Thought content normal.         Judgment: Judgment normal.     Vitals and nursing note reviewed. Physical exam within normal limits.   DASI Risk Score      Flowsheet Row Pre-Admission Testing from 12/31/2024 in [facility]   Can you take care of yourself (eat, dress, bathe, or use toilet)?  2.75 filed at 12/31/2024 0932   Can you walk indoors, such as around your house? 1.75 filed at 12/31/2024 0932   Can you walk a block or two on level ground?  2.75 filed at 12/31/2024 0932   Can you climb a flight of stairs or walk up a hill? 5.5 filed at 12/31/2024 0932   Can you run a short distance? 8 filed at 12/31/2024 0932   Can you do light work around the house like dusting or washing dishes? 2.7 filed at 12/31/2024 0932   Can you do moderate work around the house like vacuuming, sweeping floors or carrying groceries? 3.5 filed at 12/31/2024 0932   Can you do heavy work around the house like scrubbing floors or lifting and moving heavy furniture?  8 filed at 12/31/2024 0932   Can you do yard work like raking leaves, weeding or pushing a mower? 4.5 filed at 12/31/2024 0932   Can you have sexual relations? 5.25 filed at 12/31/2024 0932   Can you participate in moderate recreational activities like golf, bowling, dancing, doubles tennis or throwing a baseball or football? 6 filed at 12/31/2024 0932   Can you participate in strenous sports like swimming, singles tennis, football, basketball, or skiing? 0 filed at 12/31/2024 0932   DASI SCORE 50.7 filed at 12/31/2024 0932   METS Score (Will be calculated only when all the questions are answered) 9 filed at 12/31/2024 0932          Caprini DVT Assessment       Flowsheet Row Pre-Admission Testing from 12/31/2024 in [facility]   DVT Score 3 filed at 12/31/2024 0931   Surgical Factors Major surgery planned, including arthroscopic and laproscopic (1-2 hours) filed at 12/31/2024 0931   BMI 30 or less filed at 12/31/2024 0931          Modified Frailty Index    No data to display       CHADS2 Stroke Risk         N/A 3 to 100%: High Risk   2 to < 3%: Medium Risk   0 to < 2%: Low Risk     Last Change: N/A          This score determines the patient's risk of having a stroke if the patient has atrial fibrillation.        This score is not applicable to this patient. Components are not calculated.          Revised Cardiac Risk Index      Flowsheet Row Pre-Admission Testing from 12/31/2024 in [facility]   High-Risk Surgery (Intraperitoneal, Intrathoracic,Suprainguinal vascular) 0 filed at 12/31/2024 0932   History of ischemic heart disease (History of MI, History of positive exercuse test, Current chest paint considered due to myocardial ischemia, Use of nitrate therapy, ECG with pathological Q Waves) 0 filed at 12/31/2024 0932   History of congestive heart failure (pulmonary edemia, bilateral rales or S3 gallop, Paroxysmal nocturnal dyspnea, CXR showing pulmonary vascular redistribution) 0 filed at 12/31/2024 0932   History of cerebrovascular disease (Prior TIA or stroke) 0 filed at 12/31/2024 0932   Pre-operative insulin treatment 0 filed at 12/31/2024 0932   Pre-operative creatinine>2 mg/dl 0 filed at 12/31/2024 0932   Revised Cardiac Risk Calculator 0 filed at 12/31/2024 0932          Apfel Simplified Score    No data to display       Risk Analysis Index Results This Encounter    No data found in the last 10 encounters.       Stop Bang Score      Flowsheet Row Pre-Admission Testing from 12/31/2024 in [facility]   Do you snore loudly? 0 filed at 12/31/2024 0840   Do you often feel tired or fatigued after your sleep? 0 filed at  12/31/2024 0840   Has anyone ever observed you stop breathing in your sleep? 1 filed at 12/31/2024 0840   Do you have or are you being treated for high blood pressure? 1 filed at 12/31/2024 0840   Recent BMI (Calculated) 24.8 filed at 12/31/2024 0840   Is BMI greater than 35 kg/m2? 0=No filed at 12/31/2024 0840   Age older than 50 years old? 0=No filed at 12/31/2024 0840   Is your neck circumference greater than 17 inches (Male) or 16 inches (Female)? 0 filed at 12/31/2024 0840   Gender - Male 1=Yes filed at 12/31/2024 0840   STOP-BANG Total Score 3 filed at 12/31/2024 0840          Prodigy: High Risk  Total Score: 8              Prodigy Gender Score          ARISCAT Score for Postoperative Pulmonary Complications    No data to display       Praneeth Perioperative Risk for Myocardial Infarction or Cardiac Arrest (JACOB)      Flowsheet Row Pre-Admission Testing from 12/31/2024 in [facility]   Age 0.76 filed at 12/31/2024 0932   Functional Status  0 filed at 12/31/2024 0932   ASA Class  -3.29 filed at 12/31/2024 0932   Creatinine 0 filed at 12/31/2024 0932   Type of Procedure  0.80 filed at 12/31/2024 0932   JACOB Total Score  -7 filed at 12/31/2024 0932   JACOB % 0.09 filed at 12/31/2024 0932              Assessment & Plan:    Neuro:  Anxiety, depression, bipolar II (wellbutrin, duloxetine, alprazolam, latuda, lamictal)     HEENT/Airway:  Patient did have MONTEZ, had weight loss was retested and no longer has MONTEZ    STOP-BANG Score-3  points low risk for MONTEZ    Mallampati::  II    TM distance::  >3 FB    Neck ROM::  Full  Dentures-denies  Crowns-denies  Implants-denies    Cardiovascular:  HLD (atorvastatin)    METS: 9  RCRI: 0 points, 3.9%  risk for postoperative MACE   JACOB: 0.09% risk for postoperative MACE    Pulmonary:  No diagnosis or significant findings on chart review or clinical presentation and evaluation.   ARISCAT: <26 points, 1.6% risk of in-hospital postoperative pulmonary complication  PRODIGY:  Low risk for opioid induced respiratory depression  Smoking History-He has never smoked.  Discussed smoking cessation and deep breathing handout given    Renal/Urinary:   OAB (gemtesa)    Preventative measures include BP monitoring, medication compliance, and hydration management.   CMP-reviewed   Creatinine-1.14  GFR-85    Endocrine:  No diagnosis or significant findings on chart review or clinical presentation and evaluation.     Hematologic/Immunology:  Exposure to hazardous body fluids see's ID (descovy as prevention)   The patient is not a Jehovah’s witness and will accept blood and blood products if medically indicated.   History of previous blood transfusions No  CBC-Pending  HGB-Pending  Caprini Score 3, patient at Low for postoperative DVT. Pt supplied education/VTE handout  Anticoagulation use: No     Gastrointestinal:   No diagnosis or significant findings on chart review or clinical presentation and evaluation.   Recreational drug use: marijuana  Alcohol use 1 liquor drinks per day    Infectious disease:   No diagnosis or significant findings on chart review or clinical presentation and evaluation.     Musculoskeletal:   SLAP tear (robaxin) .   JHFRAT score- 0 points. low risk for falls    Anesthesia:  ASA 2 - Patient with mild systemic disease with no functional limitations  Anticipated anesthesia-General  History of General anesthesia- yes  Complications- unsure as it was in 1994 that  he had his last surgery   No family history of anesthesia complications    Abnormalities noted on PAT evaluation: No    Labs & Imaging ordered:  CBC   Nickel/metal allergy-negative  Shellfish allergy-negative    Discussed with patient medication instructions, NPO guidelines, and any questions or concerns.     time spent 40+

## 2025-01-07 ENCOUNTER — OFFICE VISIT (OUTPATIENT)
Dept: UROLOGY | Facility: HOSPITAL | Age: 40
End: 2025-01-07
Payer: COMMERCIAL

## 2025-01-07 DIAGNOSIS — R35.0 URINARY FREQUENCY: Primary | ICD-10-CM

## 2025-01-07 PROCEDURE — 99214 OFFICE O/P EST MOD 30 MIN: CPT | Performed by: UROLOGY

## 2025-01-07 PROCEDURE — 1036F TOBACCO NON-USER: CPT | Performed by: UROLOGY

## 2025-01-07 ASSESSMENT — PATIENT HEALTH QUESTIONNAIRE - PHQ9
2. FEELING DOWN, DEPRESSED OR HOPELESS: NOT AT ALL
SUM OF ALL RESPONSES TO PHQ9 QUESTIONS 1 AND 2: 0
1. LITTLE INTEREST OR PLEASURE IN DOING THINGS: NOT AT ALL

## 2025-01-07 NOTE — PROGRESS NOTES
NAME:Diony Marte  DATE: 1/7/2025               Subjective:   Chief complaint: OAB    HPI:  39 y.o. male presenting for evaluation of OAB at the request of Dr. Stan CHACON HPI:   Voiding Symptoms  Frequency: Q every 45min   Urgency: Yes  Urge Incontinence: Yes  Nocturia: 6 times per night   Stream: strong  Hesitancy: No  Straining: No  Intermittency: No  Sensation of Incomplete Emptying: No  Stress Incontinence: No  Pads:  No   Dysuria:  No  Gross Hematuria:  No  UTI:  No  Stones:  No    Consumes 2 cups of coffee, 90 oz of water per day.     Yesmedications for his bladder in the past.  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    Recurrent cold sores    Low back pain, unspecified 12/17/2018    Lumbar pain    Otalgia, bilateral 04/10/2021    Otalgia of both ears    Other specified disorders of nose and nasal sinuses 10/12/2020    Tenderness over frontal sinus    Otitis media, unspecified, bilateral 03/11/2021    BOM (bilateral otitis media)    Otitis media, unspecified, right ear 03/07/2017    Acute otitis media, right    Pain in right knee 03/11/2016    Acute pain of right knee    Personal history of other diseases of the musculoskeletal system and connective tissue 11/30/2020    History of muscle pain    Personal history of other diseases of the musculoskeletal system and connective tissue 11/06/2014    History of neck pain    Personal history of other diseases of the musculoskeletal system and connective tissue     History of radiculopathy     Personal history of other diseases of the respiratory system 04/10/2021    History of sore throat    Personal history of other diseases of the respiratory system 03/26/2018    History of acute sinusitis    Personal history of other diseases of the respiratory system 03/22/2018    History of sore throat    Personal history of other diseases of the respiratory system 04/03/2020    History of paranasal sinus congestion    Personal history of other diseases of the respiratory system     Personal history of asthma    Personal history of other endocrine, nutritional and metabolic disease 11/26/2019    History of hyperlipidemia    Personal history of other infectious and parasitic diseases 03/26/2018    History of viral infection    Personal history of other mental and behavioral disorders 11/20/2019    History of depression    Personal history of other specified conditions 11/30/2020    History of diarrhea    Personal history of other specified conditions 04/09/2021    History of dizziness    Personal history of peptic ulcer disease 02/27/2019    History of gastric ulcer    Personal history of peptic ulcer disease     Personal history of gastric ulcer    Unspecified otitis externa, right ear 03/22/2018    Right otitis externa     Past Surgical History:   Procedure Laterality Date    TONSILLECTOMY  11/06/2014    Tonsillectomy     Social History     Tobacco Use    Smoking status: Never    Smokeless tobacco: Never   Substance Use Topics    Alcohol use: Yes     Alcohol/week: 7.0 standard drinks of alcohol     Types: 7 Standard drinks or equivalent per week     Family History   Problem Relation Name Age of Onset    Hyperlipidemia Other      Breast cancer Other      Diabetes Other      Neuropathy Other      Depression Other      Alcohol abuse Other      Colon cancer Other      Autism spectrum disorder Other       Current Outpatient Medications on File Prior to Visit   Medication Sig Dispense Refill    ALPRAZolam (Xanax) 0.5 mg  tablet Take 1 tablet (0.5 mg) by mouth once daily at bedtime.      atorvastatin (Lipitor) 40 mg tablet Take 1 tablet (40 mg) by mouth once daily at bedtime. 90 tablet 1    buPROPion XL (Wellbutrin XL) 150 mg 24 hr tablet Take 2 tablets (300 mg) by mouth early in the morning..      Descovy 200-25 mg tablet Take 1 tablet by mouth once daily. 30 tablet 2    doxycycline (Vibra-Tabs) 100 mg tablet TAKE 2 TABLETS WITHIN 24 TO 72 HOURS AFTER UNPROTECTED SEXUAL ENCOUNTER, NOT TO BE TAKEN DAILY. Take with a large glass of water and do not lie down for 30 minutes after. (Patient taking differently: once daily as needed. TAKE 2 TABLETS WITHIN 24 TO 72 HOURS AFTER UNPROTECTED SEXUAL ENCOUNTER, NOT TO BE TAKEN DAILY. Take with a large glass of water and do not lie down for 30 minutes after.) 30 tablet 0    DULoxetine (Cymbalta) 20 mg DR capsule Take 2 capsules (40 mg) by mouth once daily. Do not crush or chew.      esomeprazole (NexIUM) 40 mg DR capsule Take 1 capsule (40 mg) by mouth once daily in the morning. Take before meals. 90 capsule 1    finasteride (Proscar) 5 mg tablet Take 1/4 tablet daily 30 tablet 1    Gemtesa 75 mg tablet TAKE 1 TABLET IN THE MOUTH OR THROAT ONCE DAILY 90 tablet 3    lamoTRIgine (LaMICtal) 200 mg tablet Take 2 tablets (400 mg) by mouth once daily.      lurasidone (Latuda) 60 mg tablet 2 tablets (120 mg) once daily.      methocarbamol (Robaxin) 750 mg tablet Take 1 tablet (750 mg) by mouth 3 times a day for 10 days. Alternatively may take 1-2 tablets at night before bed if drowsiness occurs 30 tablet 0    tadalafil (Cialis) 10 mg tablet Take 1 tablet (10 mg) by mouth if needed for erectile dysfunction. 15 tablet 3    valACYclovir (Valtrex) 1 gram tablet TAKE 2 TABLETS EVERY 12 HOURS FOR 1 DAY AS NEEDED AT EARLY ONSET OF COLD SORES 40 tablet 0     Current Facility-Administered Medications on File Prior to Visit   Medication Dose Route Frequency Provider Last Rate Last Admin    ciprofloxacin (Cipro)  tablet 500 mg  500 mg oral Once Celio Pierce MD MPH         Diony is allergic to midazolam, amoxicillin, and aripiprazole.     Review of Systems    14 point ROS reviewed and discussed with the patient. Pertinent positives/negatives discussed in the History of Present Illness (HPI).      FH:  Prostate CA: Yes, grandfather  Bladder CA: No  Kidney CA: No  Stones: Yes    Social History  Occupation: IT consulting  Tob: No  Etoh: Yes      Objective:     There is no height or weight on file to calculate BMI.   There were no vitals taken for this visit.     Physical Exam   1. Constitutional: NAD, Well-developed, Well-nourished  2. Respiratory: Unlabored breathing, no audible wheezes, no use of accessory muscles   3. Cardiovascular: No JVD, extremities perfused, no edema  4. Abdomen: Soft, non-tender, non-distended, no masses or hernia.  No CVA tenderness.    5. Skin: no visible lesions, plaque, rashes, jaundice  6. Neuro: Gait normal, no focal neurologic deficit  7. Psychiatric: Mood and affect normal and appropriate, alert and oriented    Labs  Lab Results   Component Value Date    GFRMALE 88 08/08/2023     Lab Results   Component Value Date    CREATININE 1.14 12/17/2024     Lab Results   Component Value Date    CHOL 153 09/20/2024     Lab Results   Component Value Date    HDL 49.7 09/20/2024     Lab Results   Component Value Date    CHHDL 3.1 09/20/2024     Lab Results   Component Value Date    LDLF 73 08/08/2023     Lab Results   Component Value Date    VLDL 14 09/20/2024     Lab Results   Component Value Date    TRIG 70 09/20/2024     Lab Results   Component Value Date    HGBA1C 5.2 02/24/2020     Lab Results   Component Value Date    HCT 38.4 (L) 12/31/2024       Assessment/Plan:   Diony Marte presents with     1. Urinary frequency      OAB    We discussed the pathophysiology of overactive bladder. We discussed possible treatment options including doing conservative voiding techniques, medications, and  surgical options.. He was counseled regarding bladder retraining, diet choices, and fluid restriction.  A handout about this given as well as a voiding diary    Patient was informed that first line treatment is behavioral therapy.  This includes:   -Fluid balancing and sometimes restriction   -Reducing caffeine or other bladder stimulants   -Bladder retraining  -Delayed voiding to help defer the overwhelming urge    Patient was informed that second line treatment includes medications. We discussed Mirabegron and that the side effects include possible increase in blood pressure in a small minority of patients, however insurance does not always cover this. We also discussed anticholinergic medications which can have the side effects of dry eyes, dry mouth, constipation and rarely cognitive changes.    I mentioned 3rd line management options of neuromodulation and Botox injections as well    Patient continues to have bothersome urinary symptoms despite conservative measures and had tried at least 2 medications (anti-cholinergic or beta-agonist).  We discussed OAB pathway as above.  We went into detail about Axonics neuromodulation.  We discussed that this is a long-term treatment that helps to restore communication between the brain, bladder and bowel.    We discussed the peripheral nerve evaluation (PNE).  That it would be done in the office setting with local anesthetic.  Two tiny wires, or leads, would be inserted in the upper buttock.  This would be guided by bony landmarks and patients sensory responses.  Once we confirmed the correct location the leads would be connected to an external device and secured to the patients back.  They would return home with no major restrictions, just no showering or strenuous activity.  They will keep a voiding diary to monitor their responses. They will follow up in office the week after to have the leads removed and review the voiding diary.  A successful trial is deemed if >50%  response.    If we decide to move forward with permanent implant that will be done in the operating room with light sedation.  A permanent lead would be placed under fluoroscopic guidance to confirm correct positioning.  This is then connected to an internal pulse generator which can last up to 20 years.    Risks of change in sensation, pain, irritation, bleeding, movement of the lead and implant infection discussed.    Patient in agreement and wants to proceed.

## 2025-01-30 ENCOUNTER — HOSPITAL ENCOUNTER (OUTPATIENT)
Facility: HOSPITAL | Age: 40
Setting detail: OUTPATIENT SURGERY
Discharge: HOME | End: 2025-01-30
Attending: ORTHOPAEDIC SURGERY | Admitting: ORTHOPAEDIC SURGERY
Payer: COMMERCIAL

## 2025-01-30 ENCOUNTER — ANESTHESIA (OUTPATIENT)
Dept: OPERATING ROOM | Facility: HOSPITAL | Age: 40
End: 2025-01-30
Payer: COMMERCIAL

## 2025-01-30 ENCOUNTER — ANESTHESIA EVENT (OUTPATIENT)
Dept: OPERATING ROOM | Facility: HOSPITAL | Age: 40
End: 2025-01-30
Payer: COMMERCIAL

## 2025-01-30 VITALS
SYSTOLIC BLOOD PRESSURE: 135 MMHG | OXYGEN SATURATION: 96 % | DIASTOLIC BLOOD PRESSURE: 82 MMHG | HEART RATE: 88 BPM | BODY MASS INDEX: 24.56 KG/M2 | TEMPERATURE: 98.6 F | RESPIRATION RATE: 16 BRPM | WEIGHT: 197.53 LBS | HEIGHT: 75 IN

## 2025-01-30 DIAGNOSIS — S43.432D TYPE 2 SUPERIOR LABRUM EXTENDING FROM ANTERIOR TO POSTERIOR (SLAP) LESION OF LEFT SHOULDER, SUBSEQUENT ENCOUNTER: Primary | ICD-10-CM

## 2025-01-30 PROCEDURE — 2500000004 HC RX 250 GENERAL PHARMACY W/ HCPCS (ALT 636 FOR OP/ED): Performed by: ANESTHESIOLOGY

## 2025-01-30 PROCEDURE — 64415 NJX AA&/STRD BRCH PLXS IMG: CPT | Performed by: ANESTHESIOLOGY

## 2025-01-30 PROCEDURE — A29807 PR SHLDR ARTHROSCOP,SURG,REPAIR,SLAP LESION: Performed by: ANESTHESIOLOGY

## 2025-01-30 PROCEDURE — 3700000001 HC GENERAL ANESTHESIA TIME - INITIAL BASE CHARGE: Performed by: ORTHOPAEDIC SURGERY

## 2025-01-30 PROCEDURE — 2720000007 HC OR 272 NO HCPCS: Performed by: ORTHOPAEDIC SURGERY

## 2025-01-30 PROCEDURE — 2780000003 HC OR 278 NO HCPCS: Performed by: ORTHOPAEDIC SURGERY

## 2025-01-30 PROCEDURE — 7100000002 HC RECOVERY ROOM TIME - EACH INCREMENTAL 1 MINUTE: Performed by: ORTHOPAEDIC SURGERY

## 2025-01-30 PROCEDURE — 2500000004 HC RX 250 GENERAL PHARMACY W/ HCPCS (ALT 636 FOR OP/ED)

## 2025-01-30 PROCEDURE — 7100000010 HC PHASE TWO TIME - EACH INCREMENTAL 1 MINUTE: Performed by: ORTHOPAEDIC SURGERY

## 2025-01-30 PROCEDURE — 29807 SHO ARTHRS SRG RPR SLAP LES: CPT | Performed by: ORTHOPAEDIC SURGERY

## 2025-01-30 PROCEDURE — 3700000002 HC GENERAL ANESTHESIA TIME - EACH INCREMENTAL 1 MINUTE: Performed by: ORTHOPAEDIC SURGERY

## 2025-01-30 PROCEDURE — A4649 SURGICAL SUPPLIES: HCPCS | Performed by: ORTHOPAEDIC SURGERY

## 2025-01-30 PROCEDURE — 7100000009 HC PHASE TWO TIME - INITIAL BASE CHARGE: Performed by: ORTHOPAEDIC SURGERY

## 2025-01-30 PROCEDURE — C1713 ANCHOR/SCREW BN/BN,TIS/BN: HCPCS | Performed by: ORTHOPAEDIC SURGERY

## 2025-01-30 PROCEDURE — 7100000001 HC RECOVERY ROOM TIME - INITIAL BASE CHARGE: Performed by: ORTHOPAEDIC SURGERY

## 2025-01-30 PROCEDURE — A29807 PR SHLDR ARTHROSCOP,SURG,REPAIR,SLAP LESION: Performed by: NURSE ANESTHETIST, CERTIFIED REGISTERED

## 2025-01-30 PROCEDURE — 3600000004 HC OR TIME - INITIAL BASE CHARGE - PROCEDURE LEVEL FOUR: Performed by: ORTHOPAEDIC SURGERY

## 2025-01-30 PROCEDURE — 2500000004 HC RX 250 GENERAL PHARMACY W/ HCPCS (ALT 636 FOR OP/ED): Performed by: ORTHOPAEDIC SURGERY

## 2025-01-30 PROCEDURE — 2500000004 HC RX 250 GENERAL PHARMACY W/ HCPCS (ALT 636 FOR OP/ED): Performed by: NURSE ANESTHETIST, CERTIFIED REGISTERED

## 2025-01-30 PROCEDURE — 3600000009 HC OR TIME - EACH INCREMENTAL 1 MINUTE - PROCEDURE LEVEL FOUR: Performed by: ORTHOPAEDIC SURGERY

## 2025-01-30 DEVICE — SUTURE ANCHOR, Y-KNOT PROFLEX, 1.3MM: Type: IMPLANTABLE DEVICE | Site: SHOULDER | Status: FUNCTIONAL

## 2025-01-30 RX ORDER — SODIUM CHLORIDE, SODIUM LACTATE, POTASSIUM CHLORIDE, CALCIUM CHLORIDE 600; 310; 30; 20 MG/100ML; MG/100ML; MG/100ML; MG/100ML
100 INJECTION, SOLUTION INTRAVENOUS CONTINUOUS
Status: DISCONTINUED | OUTPATIENT
Start: 2025-01-30 | End: 2025-01-30 | Stop reason: HOSPADM

## 2025-01-30 RX ORDER — ONDANSETRON HYDROCHLORIDE 2 MG/ML
4 INJECTION, SOLUTION INTRAVENOUS ONCE AS NEEDED
Status: DISCONTINUED | OUTPATIENT
Start: 2025-01-30 | End: 2025-01-30 | Stop reason: HOSPADM

## 2025-01-30 RX ORDER — ONDANSETRON HYDROCHLORIDE 2 MG/ML
INJECTION, SOLUTION INTRAVENOUS AS NEEDED
Status: DISCONTINUED | OUTPATIENT
Start: 2025-01-30 | End: 2025-01-30

## 2025-01-30 RX ORDER — ROCURONIUM BROMIDE 10 MG/ML
INJECTION, SOLUTION INTRAVENOUS AS NEEDED
Status: DISCONTINUED | OUTPATIENT
Start: 2025-01-30 | End: 2025-01-30

## 2025-01-30 RX ORDER — ALBUTEROL SULFATE 0.83 MG/ML
2.5 SOLUTION RESPIRATORY (INHALATION) ONCE AS NEEDED
Status: DISCONTINUED | OUTPATIENT
Start: 2025-01-30 | End: 2025-01-30 | Stop reason: HOSPADM

## 2025-01-30 RX ORDER — PROPOFOL 10 MG/ML
INJECTION, EMULSION INTRAVENOUS AS NEEDED
Status: DISCONTINUED | OUTPATIENT
Start: 2025-01-30 | End: 2025-01-30

## 2025-01-30 RX ORDER — LIDOCAINE HYDROCHLORIDE 10 MG/ML
INJECTION, SOLUTION EPIDURAL; INFILTRATION; INTRACAUDAL; PERINEURAL AS NEEDED
Status: DISCONTINUED | OUTPATIENT
Start: 2025-01-30 | End: 2025-01-30

## 2025-01-30 RX ORDER — LABETALOL HYDROCHLORIDE 5 MG/ML
5 INJECTION, SOLUTION INTRAVENOUS ONCE AS NEEDED
Status: DISCONTINUED | OUTPATIENT
Start: 2025-01-30 | End: 2025-01-30 | Stop reason: HOSPADM

## 2025-01-30 RX ORDER — CLINDAMYCIN PHOSPHATE 900 MG/50ML
900 INJECTION, SOLUTION INTRAVENOUS ONCE
Status: COMPLETED | OUTPATIENT
Start: 2025-01-30 | End: 2025-01-30

## 2025-01-30 RX ORDER — HYDRALAZINE HYDROCHLORIDE 20 MG/ML
5 INJECTION INTRAMUSCULAR; INTRAVENOUS EVERY 30 MIN PRN
Status: DISCONTINUED | OUTPATIENT
Start: 2025-01-30 | End: 2025-01-30 | Stop reason: HOSPADM

## 2025-01-30 RX ORDER — FENTANYL CITRATE 50 UG/ML
50 INJECTION, SOLUTION INTRAMUSCULAR; INTRAVENOUS EVERY 5 MIN PRN
Status: DISCONTINUED | OUTPATIENT
Start: 2025-01-30 | End: 2025-01-30 | Stop reason: HOSPADM

## 2025-01-30 RX ORDER — FENTANYL CITRATE 50 UG/ML
100 INJECTION, SOLUTION INTRAMUSCULAR; INTRAVENOUS ONCE
Status: COMPLETED | OUTPATIENT
Start: 2025-01-30 | End: 2025-01-30

## 2025-01-30 RX ORDER — MEPERIDINE HYDROCHLORIDE 25 MG/ML
12.5 INJECTION INTRAMUSCULAR; INTRAVENOUS; SUBCUTANEOUS EVERY 10 MIN PRN
Status: DISCONTINUED | OUTPATIENT
Start: 2025-01-30 | End: 2025-01-30 | Stop reason: HOSPADM

## 2025-01-30 RX ORDER — OXYCODONE AND ACETAMINOPHEN 5; 325 MG/1; MG/1
1 TABLET ORAL EVERY 4 HOURS PRN
Qty: 42 TABLET | Refills: 0 | Status: SHIPPED | OUTPATIENT
Start: 2025-01-30 | End: 2025-02-06

## 2025-01-30 RX ORDER — FENTANYL CITRATE 50 UG/ML
INJECTION, SOLUTION INTRAMUSCULAR; INTRAVENOUS AS NEEDED
Status: DISCONTINUED | OUTPATIENT
Start: 2025-01-30 | End: 2025-01-30

## 2025-01-30 RX ADMIN — FENTANYL CITRATE 50 MCG: 50 INJECTION INTRAMUSCULAR; INTRAVENOUS at 12:53

## 2025-01-30 RX ADMIN — PROPOFOL 200 MG: 10 INJECTION, EMULSION INTRAVENOUS at 11:50

## 2025-01-30 RX ADMIN — HYDROMORPHONE HYDROCHLORIDE 0.5 MG: 0.5 INJECTION, SOLUTION INTRAMUSCULAR; INTRAVENOUS; SUBCUTANEOUS at 13:14

## 2025-01-30 RX ADMIN — SODIUM CHLORIDE, POTASSIUM CHLORIDE, SODIUM LACTATE AND CALCIUM CHLORIDE: 600; 310; 30; 20 INJECTION, SOLUTION INTRAVENOUS at 11:42

## 2025-01-30 RX ADMIN — ROCURONIUM BROMIDE 50 MG: 10 INJECTION, SOLUTION INTRAVENOUS at 11:50

## 2025-01-30 RX ADMIN — ONDANSETRON 4 MG: 2 INJECTION, SOLUTION INTRAMUSCULAR; INTRAVENOUS at 12:34

## 2025-01-30 RX ADMIN — HYDROMORPHONE HYDROCHLORIDE 0.5 MG: 0.5 INJECTION, SOLUTION INTRAMUSCULAR; INTRAVENOUS; SUBCUTANEOUS at 13:09

## 2025-01-30 RX ADMIN — HYDROMORPHONE HYDROCHLORIDE 0.5 MG: 0.5 INJECTION, SOLUTION INTRAMUSCULAR; INTRAVENOUS; SUBCUTANEOUS at 13:02

## 2025-01-30 RX ADMIN — CLINDAMYCIN PHOSPHATE 900 MG: 900 INJECTION, SOLUTION INTRAVENOUS at 11:44

## 2025-01-30 RX ADMIN — FENTANYL CITRATE 50 MCG: 50 INJECTION INTRAMUSCULAR; INTRAVENOUS at 12:34

## 2025-01-30 RX ADMIN — LIDOCAINE HYDROCHLORIDE 50 MG: 10 INJECTION, SOLUTION EPIDURAL; INFILTRATION; INTRACAUDAL; PERINEURAL at 11:50

## 2025-01-30 RX ADMIN — SUGAMMADEX 200 MG: 100 INJECTION, SOLUTION INTRAVENOUS at 12:49

## 2025-01-30 RX ADMIN — FENTANYL CITRATE 100 MCG: 50 INJECTION INTRAMUSCULAR; INTRAVENOUS at 11:03

## 2025-01-30 RX ADMIN — DEXAMETHASONE SODIUM PHOSPHATE 8 MG: 4 INJECTION, SOLUTION INTRAMUSCULAR; INTRAVENOUS at 12:34

## 2025-01-30 RX ADMIN — FENTANYL CITRATE 100 MCG: 50 INJECTION INTRAMUSCULAR; INTRAVENOUS at 11:50

## 2025-01-30 SDOH — HEALTH STABILITY: MENTAL HEALTH: CURRENT SMOKER: 0

## 2025-01-30 ASSESSMENT — PAIN - FUNCTIONAL ASSESSMENT

## 2025-01-30 ASSESSMENT — PAIN SCALES - GENERAL
PAINLEVEL_OUTOF10: 5 - MODERATE PAIN
PAINLEVEL_OUTOF10: 8
PAINLEVEL_OUTOF10: 0 - NO PAIN
PAIN_LEVEL: 4
PAINLEVEL_OUTOF10: 2
PAINLEVEL_OUTOF10: 8
PAINLEVEL_OUTOF10: 7
PAINLEVEL_OUTOF10: 0 - NO PAIN
PAINLEVEL_OUTOF10: 7
PAINLEVEL_OUTOF10: 6
PAINLEVEL_OUTOF10: 0 - NO PAIN
PAINLEVEL_OUTOF10: 8
PAINLEVEL_OUTOF10: 4
PAINLEVEL_OUTOF10: 7
PAINLEVEL_OUTOF10: 7
PAINLEVEL_OUTOF10: 4
PAINLEVEL_OUTOF10: 6
PAINLEVEL_OUTOF10: 7

## 2025-01-30 ASSESSMENT — COLUMBIA-SUICIDE SEVERITY RATING SCALE - C-SSRS
2. HAVE YOU ACTUALLY HAD ANY THOUGHTS OF KILLING YOURSELF?: NO
1. IN THE PAST MONTH, HAVE YOU WISHED YOU WERE DEAD OR WISHED YOU COULD GO TO SLEEP AND NOT WAKE UP?: NO
6. HAVE YOU EVER DONE ANYTHING, STARTED TO DO ANYTHING, OR PREPARED TO DO ANYTHING TO END YOUR LIFE?: NO

## 2025-01-30 ASSESSMENT — PAIN DESCRIPTION - DESCRIPTORS
DESCRIPTORS: PRESSURE
DESCRIPTORS: SHARP;THROBBING
DESCRIPTORS: ACHING

## 2025-01-30 NOTE — H&P
History Of Present Illness  Leandro Marte is a 39 y.o. male presenting with left shoulder SLAP lesion.     Past Medical History  He has a past medical history of Body mass index (BMI) 27.0-27.9, adult (12/13/2018), Contact with and (suspected) exposure to other viral communicable diseases (02/21/2019), Disorder of bone, unspecified (04/12/2021), Dysphagia, pharyngoesophageal phase (02/06/2017), Herpesviral vesicular dermatitis (09/09/2019), Low back pain, unspecified (12/17/2018), Otalgia, bilateral (04/10/2021), Other specified disorders of nose and nasal sinuses (10/12/2020), Otitis media, unspecified, bilateral (03/11/2021), Otitis media, unspecified, right ear (03/07/2017), Pain in right knee (03/11/2016), Personal history of other diseases of the musculoskeletal system and connective tissue (11/30/2020), Personal history of other diseases of the musculoskeletal system and connective tissue (11/06/2014), Personal history of other diseases of the musculoskeletal system and connective tissue, Personal history of other diseases of the respiratory system (04/10/2021), Personal history of other diseases of the respiratory system (03/26/2018), Personal history of other diseases of the respiratory system (03/22/2018), Personal history of other diseases of the respiratory system (04/03/2020), Personal history of other diseases of the respiratory system, Personal history of other endocrine, nutritional and metabolic disease (11/26/2019), Personal history of other infectious and parasitic diseases (03/26/2018), Personal history of other mental and behavioral disorders (11/20/2019), Personal history of other specified conditions (11/30/2020), Personal history of other specified conditions (04/09/2021), Personal history of peptic ulcer disease (02/27/2019), Personal history of peptic ulcer disease, and Unspecified otitis externa, right ear (03/22/2018).    Surgical History  He has a past surgical history that includes  Tonsillectomy (11/06/2014).     Social History  He reports that he has never smoked. He has never used smokeless tobacco. He reports current alcohol use of about 7.0 standard drinks of alcohol per week. He reports current drug use. Drug: Marijuana.    Family History  Family History   Problem Relation Name Age of Onset    Hyperlipidemia Other      Breast cancer Other      Diabetes Other      Neuropathy Other      Depression Other      Alcohol abuse Other      Colon cancer Other      Autism spectrum disorder Other          Allergies  Midazolam, Amoxicillin, and Aripiprazole    Review of Systems     Physical Exam     Last Recorded Vitals  There were no vitals taken for this visit.    Relevant Results      Scheduled medications  ciprofloxacin, 500 mg, oral, Once      Continuous medications     PRN medications    No results found for this or any previous visit (from the past 24 hours).    Assessment/Plan   Assessment & Plan  Type 2 superior labrum extending from anterior to posterior (SLAP) lesion of left shoulder      Plans for arthroscopy left shoulder with debridement and possible repair of SLAP lesion       I spent 15 minutes in the professional and overall care of this patient.      Magdi Mejia MD

## 2025-01-30 NOTE — ANESTHESIA PROCEDURE NOTES
Airway  Date/Time: 1/30/2025 11:52 AM  Urgency: elective    Airway not difficult    Staffing  Performed: CRNA   Authorized by: Hermelindo Vital MD    Performed by: JOSE Yan-YEE  Patient location during procedure: OR    Indications and Patient Condition  Indications for airway management: anesthesia and airway protection  Spontaneous ventilation: present  Sedation level: deep  Preoxygenated: yes  Patient position: sniffing  Mask difficulty assessment: 1 - vent by mask  No planned trial extubation    Final Airway Details  Final airway type: endotracheal airway      Successful airway: ETT  Cuffed: yes   Successful intubation technique: direct laryngoscopy  Facilitating devices/methods: cricoid pressure and intubating stylet  Endotracheal tube insertion site: oral  Blade: Odilon  Blade size: #4  ETT size (mm): 8.0  Cormack-Lehane Classification: grade I - full view of glottis  Placement verified by: capnometry   Cuff volume (mL): 7  Measured from: lips  ETT to lips (cm): 23  Number of attempts at approach: 1

## 2025-01-30 NOTE — NURSING NOTE
Dr. Mcguire at bedside to evaluate patient's pain and do nerve block.  Brooke SONG also at bedside to assist.

## 2025-01-30 NOTE — NURSING NOTE
Patient is awake, alert and oriented.  Reports pain is tolerable at 6/10 and is more of a pressure than throbbing (to left shoulder).  Patient is tolerating PO w/o N/V.

## 2025-01-30 NOTE — OP NOTE
"LEFT Shoulder arthroscopy with repair SLAP lesion (L) Operative Note     Date: 2025  OR Location: [facility]    Name: Diony Marte \"Leandro\", : 1985, Age: 39 y.o., MRN: [id number], Sex: male    Diagnosis  Pre-op Diagnosis      * Type 2 superior labrum extending from anterior to posterior (SLAP) lesion of left shoulder, subsequent encounter [S43.432D] Post-op Diagnosis     * Type 2 superior labrum extending from anterior to posterior (SLAP) lesion of left shoulder, subsequent encounter [S43.432D]     Procedures  LEFT Shoulder arthroscopy with repair SLAP lesion  909[address] - [place] SURGICAL ARTHROSCOPY SHOULDER REPAIR SLAP LESION      Surgeons      * Magdi Mejia - Primary    Resident/Fellow/Other Assistant:  Surgeons and Role:  * No surgeons found with a matching role *    Staff:   Circulator: Riddhi Trinidadub Person: Imtiaz Trinidadub Person: Meron Rodriguez Circulator: Michaela    Anesthesia Staff: Anesthesiologist: Hermelindo Vital MD  CRNA: JOSE Yan-YEE    Procedure Summary  Anesthesia: Regional, General  ASA: II  Estimated Blood Loss: 10 mL  Intra-op Medications:   Administrations occurring from 1200 to 1325 on 25:   Medication Name Total Dose   dexAMETHasone (Decadron) injection 4 mg/mL 8 mg   dexmedeTOMIDine (Precedex) bolus from bag 4 mcg   fentaNYL (Sublimaze) injection 50 mcg/mL 50 mcg   lactated Ringer's infusion Cannot be calculated   ondansetron (Zofran) 2 mg/mL injection 4 mg              Anesthesia Record               Intraprocedure I/O Totals          Intake    Dexmedetomidine 0.00 mL    The total shown is the total volume documented since Anesthesia Start was filed.    lactated Ringer's infusion 600.00 mL    clindamycin (Cleocin) 900 mg in dextrose 5% IV 50 mL 50.00 mL    Total Intake 650 mL       Output    Est. Blood Loss 5 mL    Total Output 5 mL       Net    Net Volume 645 mL          Specimen: No specimens collected              Drains and/or Catheters: * None in log *    Tourniquet " "Times:         Implants:  Implants       Type Name Action Serial No.      Implant SUTURE ANCHOR, Y-KNOT PROFLEX, 1.3MM - [id number] Implanted      Implant SUTURE ANCHOR, Y-KNOT PROFLEX, 1.3MM - [id number] Wasted      Implant SUTURE ANCHOR, Y-KNOT PROFLEX, 1.3MM - [id number] Implanted               Findings: SLAP lesion anteriorly with destabilized biceps tendon intact glenohumeral articular cartilage intact biceps tendon intact rotator cuff    Indications: Diony Marte \"Leandro\" is an 39 y.o. male who is having surgery for Type 2 superior labrum extending from anterior to posterior (SLAP) lesion of left shoulder, subsequent encounter [D96.377A].  Patient has a SLAP lesion of his left shoulder which has persisted given symptoms unresponsive to nonoperative treatment potential benefits possible risks alternatives and rehabilitation sequence required after surgical treatment were explained the patient did request and schedule surgery provided informed consent obtained on the day of the procedure.    The patient was seen in the preoperative area. The risks, benefits, complications, treatment options, non-operative alternatives, expected recovery and outcomes were discussed with the patient. The possibilities of reaction to medication, pulmonary aspiration, injury to surrounding structures, bleeding, recurrent infection, the need for additional procedures, failure to diagnose a condition, and creating a complication requiring transfusion or operation were discussed with the patient. The patient concurred with the proposed plan, giving informed consent.  The site of surgery was properly noted/marked if necessary per policy. The patient has been actively warmed in preoperative area. Preoperative antibiotics have been ordered and given within 1 hours of incision. Venous thrombosis prophylaxis have been ordered including bilateral sequential compression devices    Procedure Details: Patient was transported the operating " room and placed supine on the operating table timeout was called the marked site confirmed patient identification confirmed procedure reviewed allergies reviewed and antibiotics administered general endotracheal anesthesia was done successfully patient moved to seated position all bony prominences were carefully padded sequential compression devices were placed on his legs for DVT prophylaxis left upper thigh sterilely prepped and draped diagnostic arthroscopy performed revealed the findings noted above the labrum tear extended from just above the biceps anchor to just the mid labral sulcus this was debrided with a synovial shaver and the tear was noted to extend under the anterior leaf of the biceps attachment.  The undersurface of the labrum and bone on the glenoid side were abraded and synovial shaver used to prepare the bed.  2 anchors were placed 1 at the 12 o'clock position just above the biceps insertion and 1 at the 1030 o'clock position just below the biceps insertion and these were used to tie down the labrum and biceps anchor into the prepared bed.  On this achieved excellent and anatomic restoration of the lesion.  Incisions were then closed with absorbable nonabsorbable sutures sterile dressings were applied patient's arm placed in a sling he was awakened and transferred to the recovery room there were no complications.  Complications:  None; patient tolerated the procedure well.    Disposition: PACU - hemodynamically stable.  Condition: stable                 Additional Details: None    Attending Attestation:     Magdi Mejia  Phone Number: [phone]

## 2025-01-30 NOTE — ANESTHESIA PROCEDURE NOTES
Peripheral Block    Patient location during procedure: pre-op  Start time: 1/30/2025 11:06 AM  End time: 1/30/2025 11:08 AM  Reason for block: at surgeon's request and post-op pain management  Staffing  Performed: attending   Authorized by: Hermelindo Vital MD    Performed by: Lex Capellan MD  Preanesthetic Checklist  Completed: patient identified, IV checked, site marked, risks and benefits discussed, surgical consent, monitors and equipment checked, pre-op evaluation and timeout performed   Timeout performed at: 1/30/2025 11:03 AM  Peripheral Block  Patient position: laying flat  Prep: ChloraPrep  Patient monitoring: heart rate, cardiac monitor and continuous pulse ox  Block type: interscalene  Laterality: left  Injection technique: single-shot  Guidance: ultrasound guided  Needle  Needle type: short-bevel   Needle gauge: 22 G  Needle length: 5 cm  Needle localization: ultrasound guidance     image stored in chart  Test dose: negative  Assessment  Injection assessment: no paresthesia on injection, negative aspiration for heme, incremental injection and local visualized surrounding nerve on ultrasound  Paresthesia pain: none  Heart rate change: no  Slow fractionated injection: yes  Additional Notes  Ropivicaine 20 ml 0.5% with 5 mg decadron preservative free.    Patient able to flex and extend hand post nerve block.    Ultrasound guidance used-- able to visualize needle, Nerve roots and local anesthetic  filling around the nerve roots as it is injected.    Pateint with pain on posterior top of shoulder, no discomfort below.  Rescue block in PACU performed.  T/o 1327, start 1329. End 1333.  20 ml 0.5% ropivicaine with 5 mg decadron.

## 2025-01-30 NOTE — ANESTHESIA PREPROCEDURE EVALUATION
"Patient: Diony Marte \"Leandro\"    Procedure Information       Date/Time: 01/30/25 1200    Procedure: LEFT Shoulder arthroscopy with repair SLAP lesion (Linvatec,beach chair) (Left: Shoulder)    Location: [facility] / [facility]    Surgeons: Magdi Mejia MD            Relevant Problems   Neuro   (+) Anxiety   (+) Bipolar affective disorder (Multi)   (+) Chronic headaches   (+) Cubital tunnel syndrome on left      GI   (+) GERD (gastroesophageal reflux disease)   (+) Peptic ulcer      Hematology   (+) Anemia      ID   (+) COVID-19 virus infection       Clinical information reviewed:                   NPO Detail:  No data recorded     Physical Exam    Airway  Mallampati: III  TM distance: >3 FB  Neck ROM: full     Cardiovascular   Comments: deferred   Dental    Pulmonary   Comments: deferred   Abdominal     Comments: deferred           Anesthesia Plan    History of general anesthesia?: yes  History of complications of general anesthesia?: no    ASA 2     general and regional   (Interscalene brachial plexus block plus general endotracheal anesthesia)  The patient is not a current smoker.  Education provided regarding risk of obstructive sleep apnea.  intravenous induction   Postoperative administration of opioids is intended.  Anesthetic plan and risks discussed with patient.    Plan discussed with CRNA.      " 0

## 2025-01-30 NOTE — ANESTHESIA POSTPROCEDURE EVALUATION
"Patient: Diony Marte \"Leandro\"    Procedure Summary       Date: 01/30/25 Room / Location: [facility] / [facility]    Anesthesia Start: 1142 Anesthesia Stop: 1258    Procedure: LEFT Shoulder arthroscopy with repair SLAP lesion (Left: Shoulder) Diagnosis:       Type 2 superior labrum extending from anterior to posterior (SLAP) lesion of left shoulder, subsequent encounter      (Type 2 superior labrum extending from anterior to posterior (SLAP) lesion of left shoulder, subsequent encounter [S43.788H])    Surgeons: Magdi Mejia MD Responsible Provider: Hermelindo Vital MD    Anesthesia Type: general, regional ASA Status: 2            Anesthesia Type: general, regional    Vitals Value Taken Time   /92 01/30/25 1355   Temp 36.3 °C (97.3 °F) 01/30/25 1330   Pulse 90 01/30/25 1355   Resp 16 01/30/25 1355   SpO2 96 % 01/30/25 1355       Anesthesia Post Evaluation    Patient location during evaluation: PACU  Patient participation: complete - patient participated  Level of consciousness: awake and alert  Pain score: 4  Pain management: adequate  Multimodal analgesia pain management approach  Airway patency: patent  Two or more strategies used to mitigate risk of obstructive sleep apnea  Cardiovascular status: acceptable and blood pressure returned to baseline  Respiratory status: acceptable  Hydration status: acceptable  Postoperative Nausea and Vomiting: none  Comments: Pain was holding in the 7/10 range for a significant period of time post-op.  Therefore a rescue block was performed by Dr. Capellan in the PACU.  The patient was down to a 4 prior to discharge.        There were no known notable events for this encounter.    "

## 2025-02-05 ENCOUNTER — OFFICE VISIT (OUTPATIENT)
Dept: ORTHOPEDIC SURGERY | Facility: HOSPITAL | Age: 40
End: 2025-02-05
Payer: COMMERCIAL

## 2025-02-05 VITALS — BODY MASS INDEX: 24.25 KG/M2 | HEIGHT: 75 IN | WEIGHT: 195 LBS

## 2025-02-05 DIAGNOSIS — S43.432S TYPE 2 SUPERIOR LABRUM EXTENDING FROM ANTERIOR TO POSTERIOR (SLAP) LESION OF LEFT SHOULDER, SEQUELA: Primary | ICD-10-CM

## 2025-02-05 PROCEDURE — 99211 OFF/OP EST MAY X REQ PHY/QHP: CPT | Performed by: PHYSICIAN ASSISTANT

## 2025-02-05 RX ORDER — OXYCODONE AND ACETAMINOPHEN 5; 325 MG/1; MG/1
1 TABLET ORAL EVERY 6 HOURS PRN
Qty: 24 TABLET | Refills: 0 | Status: SHIPPED | OUTPATIENT
Start: 2025-02-05 | End: 2025-02-11

## 2025-02-05 ASSESSMENT — PAIN DESCRIPTION - DESCRIPTORS: DESCRIPTORS: ACHING;SHARP;TINGLING

## 2025-02-05 ASSESSMENT — PAIN - FUNCTIONAL ASSESSMENT: PAIN_FUNCTIONAL_ASSESSMENT: 0-10

## 2025-02-05 ASSESSMENT — PAIN SCALES - GENERAL: PAINLEVEL_OUTOF10: 6

## 2025-02-05 NOTE — PROGRESS NOTES
Patient presents to clinic today for first postoperative visit after left shoulder arthroscopic SLAP lesion repair performed on 1/30/2025.  He is doing well still having moderate amount of pain.  Requiring continued narcotic pain medication.  Requesting a refill.  Does have therapy set up for this week.  Is also experiencing some numbness and tingling type sensation in the hand.  He did have an incident where he felt a sharp shooting pain after pushing himself up accidentally with the arm however this has slowly started to subside.    Examination: Surgical incisions are healing well no surrounding erythema active drainage or signs of active infection light sensation intact  strength 5/5 competitiveness radial pulse.  Diffuse ecchymosis.    Impression: Left shoulder labrum tear    Plan: He will continue with sling usage for the next 5 weeks may come out for hygiene purposes and elbow range of motion.  We discussed specific restrictions including no resisted biceps or overhead pushing or pulling.  He was encouraged to come out of the sling to work on on resisted elbow range of motion as this may help improve some of the numbness tingling sensation that he is experiencing.  I sent a refill for his pain medication however he was encouraged to begin to wean off of this.  He will begin with therapy and follow-up with our office in 3 weeks.    I have personally reviewed the OARRS report for this patient. This report is scanned into the electronic medical record. I have considered the risks of abuse, dependence, addiction and diversion.    Elaine Ortiz PA-C  Department of Orthopaedic Surgery  [facility]    Dictation performed with the use of voice recognition software. Syntax and grammatical errors may exist.

## 2025-02-07 ENCOUNTER — EVALUATION (OUTPATIENT)
Dept: PHYSICAL THERAPY | Facility: CLINIC | Age: 40
End: 2025-02-07
Payer: COMMERCIAL

## 2025-02-07 DIAGNOSIS — M25.512 ACUTE PAIN OF LEFT SHOULDER: Primary | ICD-10-CM

## 2025-02-07 DIAGNOSIS — Z98.890 STATUS POST LABRAL REPAIR OF SHOULDER: ICD-10-CM

## 2025-02-07 DIAGNOSIS — S43.432S TYPE 2 SUPERIOR LABRUM EXTENDING FROM ANTERIOR TO POSTERIOR (SLAP) LESION OF LEFT SHOULDER, SEQUELA: ICD-10-CM

## 2025-02-07 PROCEDURE — 97161 PT EVAL LOW COMPLEX 20 MIN: CPT | Mod: GP | Performed by: PHYSICAL THERAPIST

## 2025-02-07 PROCEDURE — 97140 MANUAL THERAPY 1/> REGIONS: CPT | Mod: GP | Performed by: PHYSICAL THERAPIST

## 2025-02-07 ASSESSMENT — ENCOUNTER SYMPTOMS
OCCASIONAL FEELINGS OF UNSTEADINESS: 0
LOSS OF SENSATION IN FEET: 0
DEPRESSION: 0

## 2025-02-07 NOTE — PROGRESS NOTES
Physical Therapy Evaluation    Patient Name: Diony Marte  MRN: [id number]  Today's Date: 2/7/2025  Visit: 1/MN  Referred by: Dr. Hester  Time Calculation  Start Time: 1335  Stop Time: 1410  Time Calculation (min): 35 min  Diagnosis:   1. Acute pain of left shoulder  Follow Up In Physical Therapy      2. Status post labral repair of shoulder  Follow Up In Physical Therapy      3. Type 2 superior labrum extending from anterior to posterior (SLAP) lesion of left shoulder, sequela  Referral to Physical Therapy    Follow Up In Physical Therapy          PRECAUTIONS:   (L) SLAP repair 1/30/2025 ( protocol scanned)  Sling for 4 weeks, PROM, gentle AAROM and ISOMetrics; no resisted elbow flexion for 1 month    SUBJECTIVE:  Patient reports pain since June of 2024. Could have injured the shoulder while working out. MRI revealed torn labrum and need for surgery. (R) hand dominant.  Pain:  (L) shoulder 4/10, 7/10 - still taking narcotics before bed.  Been using ice frequently.  Home Living:  No concerns  Prior level of function:  6-10k steps per day, cycles in the summer, no longer lifting weights.  Personal factors that may impact care:  No concerns    OBJECTIVE:  Shoulder PROM: (degrees) Left Right   Flexion 90 180   Abduction 80 180   External Rotation 13@30 90@90   Internal Rotation 35@30 80@90     Shoulder Strength: MMT Left Right   Flexion NT/5 /5   Abduction NT/5 /5   External Rotation NT/5 /5   Internal Rotation NT/5 /5   *denotes pain with motion or muscle testing    Palpation: Globally TTP throughout the (L) shoulder  Posture: Mild rounding (L) shoulder secondary to use of sling    Outcome Measure:  SPADI: 127/130    ASSESSMENT:  Patient is a 39 year old male who presents to therapy on this date for evaluation of their (L) shoulder s/p labral repair. Examination on this date reveals the following deficits: decreased strength, decreased ROM, rounded posture, and decreased functional tolerance. These deficits  are leading to difficulties with ADLs as well as recreational activity. Skilled physical therapy will address these deficits to help reduce pain for return to prior level of function.    Problem list:  decreased strength, decreased ROM, rounded posture, and decreased functional tolerance.     Low complexity due to patient's clinical presentation being stable and uncomplicated by any significant comorbidities that may affect rehab tolerance and progression.     Clinical presentation:  Stable and/or uncomplicated characteristics,         TREATMENT:  - Therex:  RSB rolls on table for AAROM flexion x10  Scap squeezes x15  - Manual Therapy:  PROM (L) elbow, wrist and shoulder within tolerances and avoiding stretching.  - Modalities:    CP to the (L) shoulder to conclude therapy    PATIENT EDUCATION:  Outpatient Education  Individual(s) Educated: Patient  Education Provided: Anatomy, Home Exercise Program, Physiology, POC, Signs/Symptoms of Infection, Posture, Post-Op Precautions  Patient/Caregiver Demonstrated Understanding: yes  Plan of Care Discussed and Agreed Upon: yes  Patient Response to Education: Patient/Caregiver Verbalized Understanding of Information, Patient/Caregiver Performed Return Demonstration of Exercises/Activities, Patient/Caregiver Asked Appropriate Questions    HEP:  Access Code: [email or web address]  URL: [email or web address]  Date: 02/07/2025  Prepared by: Jorge Green    Exercises  - Flexion-Extension Shoulder Pendulum with Table Support  - 3 x daily - 7 x weekly - 3 sets - 10 reps  - Horizontal Shoulder Pendulum with Table Support (Mirrored)  - 3 x daily - 7 x weekly - 3 sets - 10 reps  - Circular Shoulder Pendulum with Table Support (Mirrored)  - 3 x daily - 7 x weekly - 3 sets - 10 reps  - Seated Shoulder Flexion Towel Slide at Table Top (Mirrored)  - 3 x daily - 7 x weekly - 2 sets - 10 reps  - Seated Scapular Retraction  - 3 x daily - 7 x weekly - 3 sets - 10 reps    PLAN:    Treatment/Interventions: Education/ Instruction, Cryotherapy, Gait training, Manual therapy, Neuromuscular re-education, Self care/ home management, Therapeutic exercises  PT Plan: Skilled PT  PT Frequency: 2 times per week  Duration: 8-10 weeks  Rehab Potential: Excellent  Plan of Care Agreement: Patient    GOALS:  Active       PT Problem       Patient Stated Goal: resume weight lifting at the gym.       Start:  02/07/25    Expected End:  04/18/25            Patient will achieve active left shoulder flexion of >150 degrees       Start:  02/07/25    Expected End:  04/18/25            Patient will achieve active left shoulder abduction of >150 degrees       Start:  02/07/25    Expected End:  04/18/25            Patient will achieve left shoulder external rotation of 75 degrees in 90 degrees abd to reach behind his head.       Start:  02/07/25    Expected End:  04/18/25            Patient will achieve left shoulder flexion strength of 4/5 or more.       Start:  02/07/25    Expected End:  04/18/25            Patient will achieve left shoulder abduction strength of 4/5 or greater.       Start:  02/07/25    Expected End:  04/18/25            Patient will demonstrate independence in home program for support of progression       Start:  02/07/25    Expected End:  04/18/25            Patient will report max pain of 2/10 demonstrating a reduction of overall pain       Start:  02/07/25    Expected End:  04/18/25

## 2025-02-10 ENCOUNTER — TREATMENT (OUTPATIENT)
Dept: PHYSICAL THERAPY | Facility: CLINIC | Age: 40
End: 2025-02-10
Payer: COMMERCIAL

## 2025-02-10 DIAGNOSIS — M25.512 ACUTE PAIN OF LEFT SHOULDER: ICD-10-CM

## 2025-02-10 DIAGNOSIS — S43.432S TYPE 2 SUPERIOR LABRUM EXTENDING FROM ANTERIOR TO POSTERIOR (SLAP) LESION OF LEFT SHOULDER, SEQUELA: ICD-10-CM

## 2025-02-10 DIAGNOSIS — Z98.890 STATUS POST LABRAL REPAIR OF SHOULDER: ICD-10-CM

## 2025-02-10 PROCEDURE — 97110 THERAPEUTIC EXERCISES: CPT | Mod: GP | Performed by: PHYSICAL THERAPIST

## 2025-02-10 PROCEDURE — 97140 MANUAL THERAPY 1/> REGIONS: CPT | Mod: GP | Performed by: PHYSICAL THERAPIST

## 2025-02-10 NOTE — PROGRESS NOTES
Physical Therapy Treatment    Patient Name: Diony Marte  MRN: [id number]  Today's Date: 2/10/2025   Visit 2/40  Time Calculation  Start Time: 1545  Stop Time: 1628  Time Calculation (min): 43 min  1. Status post labral repair of shoulder  Follow Up In Physical Therapy      2. Acute pain of left shoulder  Follow Up In Physical Therapy      3. Type 2 superior labrum extending from anterior to posterior (SLAP) lesion of left shoulder, sequela  Follow Up In Physical Therapy          PRECAUTIONS:  Fall Risk: None  (L) SLAP repair 1/30/2025 (protocol scanned)  Sling for 4 weeks, PROM, gentle AAROM and ISOMetrics; no resisted elbow flexion for 1 month    SUBJECTIVE:  Patient states that his shoulder was not sore following his first visit. HEP going well. Still abiding by sling use.  Pain level: 2/10  Compliant with HEP? Yes    OBJECTIVE:  (L) shoulder flexion PROM: 123 degrees    TREATMENT:  - Therex:  Pendulums x20 flex/ext, x20 Habd/add, x20 cw/ccw  Pulleys flexion x15, 2 sets (pre and post manual)  RSB rolls on table x15  Scap squeezes x20    - Manual Therapy:  PROM (L) shoulder in all planes per protocol    - Modalities:    MHP (L) shoulder to begin treatment    ASSESSMENT:  Patient with great tolerance to exercise and PROM on this date. No concerns at this time. Ongoing therapy will ensure full return to prior level of function.    EDUCATION:    PLAN:   Add in dowel mick exercise next visit.

## 2025-02-13 ENCOUNTER — TREATMENT (OUTPATIENT)
Dept: PHYSICAL THERAPY | Facility: CLINIC | Age: 40
End: 2025-02-13
Payer: COMMERCIAL

## 2025-02-13 DIAGNOSIS — M25.512 ACUTE PAIN OF LEFT SHOULDER: ICD-10-CM

## 2025-02-13 DIAGNOSIS — S43.432S TYPE 2 SUPERIOR LABRUM EXTENDING FROM ANTERIOR TO POSTERIOR (SLAP) LESION OF LEFT SHOULDER, SEQUELA: ICD-10-CM

## 2025-02-13 DIAGNOSIS — Z98.890 STATUS POST LABRAL REPAIR OF SHOULDER: ICD-10-CM

## 2025-02-13 PROCEDURE — 97140 MANUAL THERAPY 1/> REGIONS: CPT | Mod: GP | Performed by: PHYSICAL THERAPIST

## 2025-02-13 PROCEDURE — 97110 THERAPEUTIC EXERCISES: CPT | Mod: GP | Performed by: PHYSICAL THERAPIST

## 2025-02-13 NOTE — PROGRESS NOTES
Physical Therapy Treatment    Patient Name: Diony Marte  MRN: [id number]  Today's Date: 2/13/2025   Visit 3/40  Time Calculation  Start Time: 1425  Stop Time: 1505  Time Calculation (min): 40 min  1. Status post labral repair of shoulder  Follow Up In Physical Therapy      2. Acute pain of left shoulder  Follow Up In Physical Therapy      3. Type 2 superior labrum extending from anterior to posterior (SLAP) lesion of left shoulder, sequela  Follow Up In Physical Therapy          PRECAUTIONS:  Fall Risk: None  (L) SLAP repair 1/30/2025 (protocol scanned)  Sling for 4 weeks, PROM, gentle AAROM and ISOMetrics; no resisted elbow flexion for 1 month    SUBJECTIVE:  Patient reports some increased soreness this week and feels like it is due to return to work duties.  Pain level: 4/10  Compliant with HEP? Yes    OBJECTIVE:  (L) shoulder flexion PROM: 132 degrees    TREATMENT:  - Therex:  Pendulums x20 flex/ext, x20 Habd/add, x20 cw/ccw  Dowel mick flexion AAROM x20  Dowel mick ER AAROM LUE x20  AAROM wallslides 2x10  Pulleys flexion x20, 2 sets (pre and post manual)  Scap squeezes x20    - Manual Therapy:  PROM (L) shoulder in all planes per protocol    - Modalities:    MHP (L) shoulder to begin treatment    ASSESSMENT:  Patient tolerated treatment well. Great ROM progression noted on this date. Ongoing therapy will ensure progression through post-operative care protocol.    EDUCATION:    PLAN:   Increase HEP to include table slides and wall slides.  Introduce ISOMetrics next visit.

## 2025-02-17 ENCOUNTER — TREATMENT (OUTPATIENT)
Dept: PHYSICAL THERAPY | Facility: CLINIC | Age: 40
End: 2025-02-17
Payer: COMMERCIAL

## 2025-02-17 DIAGNOSIS — Z98.890 STATUS POST LABRAL REPAIR OF SHOULDER: ICD-10-CM

## 2025-02-17 PROCEDURE — 97140 MANUAL THERAPY 1/> REGIONS: CPT | Mod: GP | Performed by: PHYSICAL THERAPIST

## 2025-02-17 PROCEDURE — 97110 THERAPEUTIC EXERCISES: CPT | Mod: GP | Performed by: PHYSICAL THERAPIST

## 2025-02-17 NOTE — PROGRESS NOTES
"Physical Therapy Treatment    Patient Name: Diony Marte  MRN: [id number]  Today's Date: 2/17/2025   Visit 4/40  Time Calculation  Start Time: 1630  Stop Time: 1724  Time Calculation (min): 54 min  1. Status post labral repair of shoulder            PRECAUTIONS:  Fall Risk: None  (L) SLAP repair 1/30/2025 (protocol scanned)  Sling for 4 weeks, PROM, gentle AAROM and ISOMetrics; no resisted elbow flexion for 1 month    SUBJECTIVE:  Patient reports some soreness in the (L) shoulder today but not too significant. HEP going well.  Pain level: 4/10  Compliant with HEP? Yes    OBJECTIVE:  (L) shoulder flexion PROM: 138 degrees    TREATMENT:  - Therex:  Pendulums x20 flex/ext, x20 Habd/add, x20 cw/ccw  UBE fwd/rev 2'/2', L0  Dowel mick flexion AAROM x30  Dowel mick ER AAROM LUE x20  AAROM wallslides 2x10  Pulleys flexion x20, 2 sets (pre and post manual)  Scap squeezes x20  (L) shoulder ISOMetrics:   Flexion 10\"x10   ER 10\"x10   ABD 10\"x10    - Manual Therapy:  PROM (L) shoulder in all planes per protocol    - Modalities:    MHP (L) shoulder to begin treatment  CP (L) shoulder to conclude treatment    ASSESSMENT:  Patient tolerated treatment well. He is demonstrating great response to introduction of new exercises. Ongoing treatment required to fully restore ROM.    EDUCATION:    PLAN:   Continue with progression through post-operative protocol; 2x/week in clinic      "

## 2025-02-19 ENCOUNTER — TREATMENT (OUTPATIENT)
Dept: PHYSICAL THERAPY | Facility: CLINIC | Age: 40
End: 2025-02-19
Payer: COMMERCIAL

## 2025-02-19 DIAGNOSIS — Z98.890 STATUS POST LABRAL REPAIR OF SHOULDER: ICD-10-CM

## 2025-02-19 PROCEDURE — 97140 MANUAL THERAPY 1/> REGIONS: CPT | Mod: GP | Performed by: PHYSICAL THERAPIST

## 2025-02-19 PROCEDURE — 97110 THERAPEUTIC EXERCISES: CPT | Mod: GP | Performed by: PHYSICAL THERAPIST

## 2025-02-19 NOTE — PROGRESS NOTES
"Physical Therapy Treatment    Patient Name: Diony Marte  MRN: [id number]  Today's Date: 2/19/2025   Visit 5/40  Time Calculation  Start Time: 1457  Stop Time: 1551  Time Calculation (min): 54 min  1. Status post labral repair of shoulder            PRECAUTIONS:  Fall Risk: None  (L) SLAP repair 1/30/2025 (protocol scanned)  Sling for 4 weeks, PROM, gentle AAROM and ISOMetrics; no resisted elbow flexion for 1 month    SUBJECTIVE:  Patient reports some soreness in the (L) shoulder today from lifting his frying pan this morning while cooking eggs. Other than this incident, has been feeling great.  Pain level: 3/10  Compliant with HEP? Yes    OBJECTIVE:  (L) shoulder flexion PROM: 134 degrees    TREATMENT:  - Therex:  UBE fwd/rev 2.5'/2.5', L3  Dowel mick flexion AAROM x30  Dowel mick ER AAROM LUE x20  AAROM wallslides 2x10  Pulleys flexion x20, flex and scap  Scap squeezes x20  (L) shoulder ISOMetrics:   Flexion 5\"x15   ER 5\"x15   IR 5\"x15   ABD 5\"x15  Standing flexion AAROM with dowel mick 2x10    - Manual Therapy:  PROM (L) shoulder in all planes per protocol    - Modalities:    MHP (L) shoulder to begin treatment    ASSESSMENT:  Patient with slight reduction in ROM of the (L) shoulder on this date, likely due to reported soreness. Ongoing therapy will follow protocol and allow for full restoration of ROM and progression to strengthening phase of recovery.    EDUCATION:    PLAN:   DC from sling next week as patient is nearing 4 weeks.      "

## 2025-02-26 ENCOUNTER — OFFICE VISIT (OUTPATIENT)
Dept: ORTHOPEDIC SURGERY | Facility: HOSPITAL | Age: 40
End: 2025-02-26
Payer: COMMERCIAL

## 2025-02-26 ENCOUNTER — TREATMENT (OUTPATIENT)
Dept: PHYSICAL THERAPY | Facility: CLINIC | Age: 40
End: 2025-02-26
Payer: COMMERCIAL

## 2025-02-26 DIAGNOSIS — S43.432S TYPE 2 SUPERIOR LABRUM EXTENDING FROM ANTERIOR TO POSTERIOR (SLAP) LESION OF LEFT SHOULDER, SEQUELA: Primary | ICD-10-CM

## 2025-02-26 DIAGNOSIS — Z98.890 STATUS POST LABRAL REPAIR OF SHOULDER: ICD-10-CM

## 2025-02-26 PROCEDURE — 99211 OFF/OP EST MAY X REQ PHY/QHP: CPT | Performed by: ORTHOPAEDIC SURGERY

## 2025-02-26 PROCEDURE — 97140 MANUAL THERAPY 1/> REGIONS: CPT | Mod: GP | Performed by: PHYSICAL THERAPIST

## 2025-02-26 PROCEDURE — 97110 THERAPEUTIC EXERCISES: CPT | Mod: GP | Performed by: PHYSICAL THERAPIST

## 2025-02-26 ASSESSMENT — PAIN - FUNCTIONAL ASSESSMENT: PAIN_FUNCTIONAL_ASSESSMENT: 0-10

## 2025-02-26 ASSESSMENT — PAIN SCALES - GENERAL: PAINLEVEL_OUTOF10: 2

## 2025-02-26 NOTE — PROGRESS NOTES
Patient is here for follow-up 1 month status post repair of SLAP lesion left shoulder he is doing generally well pain is decreasing steadily.  On exam today he can be abducted to 90 degrees externally rotated 45 degrees and has some tightness and pain in that position.  Left shoulder SLAP lesion.  Patient is doing generally well but getting a little bit tight and stiff I would like him to discontinue the sling for daily activities and continue progression of therapy follow-up in 1 month.    This was dictated using voice recognition software and not corrected for grammatical or spelling errors.

## 2025-02-26 NOTE — PROGRESS NOTES
"Physical Therapy Treatment    Patient Name: Diony Marte  MRN: [id number]  Today's Date: 2/26/2025   Visit 6/40  Time Calculation  Start Time: 1620  Stop Time: 1715  Time Calculation (min): 55 min  1. Status post labral repair of shoulder            PRECAUTIONS:  Fall Risk: None  (L) SLAP repair 1/30/2025 (protocol scanned)  Sling for 4 weeks, PROM, gentle AAROM and ISOMetrics; no resisted elbow flexion for 1 month    SUBJECTIVE:  Patient saw the MD today whom Dc'd him from the sling. Patient states that the shoulder feels ok, just awfully tight.  Pain level: 3/10  Compliant with HEP? Yes    OBJECTIVE:  (L) shoulder flexion PROM: 136 degrees    TREATMENT:  - Therex:  UBE fwd/rev 3'/3', L5  Dowel mick flexion AAROM x30  Dowel mick ER AAROM LUE x30  AAROM wallslides 2x20  Pulleys flexion 2x20, flex and scap each  Mid rows BluTB 3x10  (L) shoulder ISOMetrics:   Flexion 5\"x15   ER 5\"x15   IR 5\"x15   ABD 5\"x15  Standing flexion AAROM with dowel mick 2x10  Standing scaption AAROM with dowel mick 2x10    - Manual Therapy:  PROM (L) shoulder in all planes per protocol    - Modalities:    MHP (L) shoulder to begin treatment    ASSESSMENT:  Patient with slow but steady gains in ROM. He is most tight with ER of the (L) shoulder, which is to be expected due to labral repair. Patient encouraged to allow for some discomfort with PROM/AAROM at home.    EDUCATION:   Access Code: [email or web address]  URL: [email or web address]  Date: 02/26/2025  Prepared by: Jorge Green    Exercises  - Seated Shoulder Flexion AAROM with Pulley Behind  - 2-3 x daily - 7 x weekly - 3 sets - 10 reps  - Seated Shoulder Scaption AAROM with Pulley at Side  - 2-3 x daily - 7 x weekly - 3 sets - 10 reps  - Shoulder Flexion Wall Slide with Towel  - 2-3 x daily - 7 x weekly - 2-3 sets - 10 reps  - Supine Shoulder External Rotation with Dowel  - 2-3 x daily - 7 x weekly - 3 sets - 10 reps  - Supine Shoulder Flexion Extension AAROM with Dowel  - " 2-3 x daily - 7 x weekly - 3 sets - 10 reps  - Standing Shoulder Extension with Dowel  - 1 x daily - 7 x weekly - 3 sets - 10 reps    PLAN:   DC from sling.  Increase HEP.  Slowly progress intensity of ROM of the (L) shoulder.

## 2025-02-27 ENCOUNTER — TREATMENT (OUTPATIENT)
Dept: PHYSICAL THERAPY | Facility: CLINIC | Age: 40
End: 2025-02-27
Payer: COMMERCIAL

## 2025-02-27 DIAGNOSIS — Z98.890 STATUS POST LABRAL REPAIR OF SHOULDER: ICD-10-CM

## 2025-02-27 PROCEDURE — 97140 MANUAL THERAPY 1/> REGIONS: CPT | Mod: GP | Performed by: PHYSICAL THERAPIST

## 2025-02-27 PROCEDURE — 97110 THERAPEUTIC EXERCISES: CPT | Mod: GP | Performed by: PHYSICAL THERAPIST

## 2025-02-27 NOTE — PROGRESS NOTES
"Physical Therapy Treatment    Patient Name: Diony Marte  MRN: [id number]  Today's Date: 2/27/2025   Visit 7/40  Time Calculation  Start Time: 1350  Stop Time: 1440  Time Calculation (min): 50 min  1. Status post labral repair of shoulder            PRECAUTIONS:  Fall Risk: None  (L) SLAP repair 1/30/2025 (protocol scanned)  Sling for 4 weeks, PROM, gentle AAROM and ISOMetrics; no resisted elbow flexion for 1 month    SUBJECTIVE:  Patient denies significant increase in pain from yesterday's session, just some diffuse soreness. New HEP has gone well.  Pain level: 3/10  Compliant with HEP? Yes    OBJECTIVE:  (L) shoulder flexion PROM: 140 degrees  (L) shoulder ER @90: 50 degrees    TREATMENT:  - Therex:  UBE fwd/rev 3'/3', L5  Dowel mick flexion AAROM x30  Dowel mick ER AAROM LUE x30  AAROM wallslides 2x20  Pulleys flexion 2x20, flex and scap each  Mid rows BluTB 3x10  (L) shoulder ISOMetrics:   Flexion 5\"x15   ER 5\"x15   IR 5\"x15   ABD 5\"x15  Standing flexion AAROM with dowel mick 2x10  Standing scaption AAROM with dowel mick 2x10  Dowel mick extension x20    - Manual Therapy:  PROM (L) shoulder in all planes per protocol    - Modalities:    MHP (L) shoulder to begin treatment    ASSESSMENT:  Patient with steady improvement in (L) shoulder ROM. With sling DC and progression of HEP, his ROM should continue to improve.    EDUCATION:   Access Code: [email or web address]    PLAN:   Slowly progress intensity of ROM of the (L) shoulder.  Continue progression from PROM to AAROM, then AROM    "

## 2025-03-04 DIAGNOSIS — Z77.21 PERSONAL HISTORY OF EXPOSURE TO POTENTIALLY HAZARDOUS BODY FLUIDS: ICD-10-CM

## 2025-03-04 RX ORDER — EMTRICITABINE AND TENOFOVIR ALAFENAMIDE 200; 25 MG/1; MG/1
TABLET ORAL
Refills: 0 | OUTPATIENT
Start: 2025-03-04

## 2025-03-05 ENCOUNTER — TREATMENT (OUTPATIENT)
Dept: PHYSICAL THERAPY | Facility: CLINIC | Age: 40
End: 2025-03-05
Payer: COMMERCIAL

## 2025-03-05 DIAGNOSIS — M25.512 ACUTE PAIN OF LEFT SHOULDER: ICD-10-CM

## 2025-03-05 DIAGNOSIS — Z98.890 STATUS POST LABRAL REPAIR OF SHOULDER: ICD-10-CM

## 2025-03-05 DIAGNOSIS — S43.432S TYPE 2 SUPERIOR LABRUM EXTENDING FROM ANTERIOR TO POSTERIOR (SLAP) LESION OF LEFT SHOULDER, SEQUELA: ICD-10-CM

## 2025-03-05 DIAGNOSIS — Z77.21 PERSONAL HISTORY OF EXPOSURE TO POTENTIALLY HAZARDOUS BODY FLUIDS: ICD-10-CM

## 2025-03-05 PROCEDURE — 97110 THERAPEUTIC EXERCISES: CPT | Mod: GP | Performed by: PHYSICAL THERAPIST

## 2025-03-05 PROCEDURE — 97140 MANUAL THERAPY 1/> REGIONS: CPT | Mod: GP | Performed by: PHYSICAL THERAPIST

## 2025-03-05 RX ORDER — DOXYCYCLINE HYCLATE 100 MG
TABLET ORAL
Qty: 30 TABLET | Refills: 0 | Status: SHIPPED | OUTPATIENT
Start: 2025-03-05

## 2025-03-05 RX ORDER — EMTRICITABINE AND TENOFOVIR ALAFENAMIDE 200; 25 MG/1; MG/1
1 TABLET ORAL DAILY
Qty: 30 TABLET | Refills: 2 | Status: SHIPPED | OUTPATIENT
Start: 2025-03-05

## 2025-03-05 NOTE — PROGRESS NOTES
"Physical Therapy Treatment    Patient Name: Diony Marte  MRN: [id number]  Today's Date: 3/5/2025   Visit 8/40  Time Calculation  Start Time: 1545  Stop Time: 1637  Time Calculation (min): 52 min  1. Status post labral repair of shoulder  Follow Up In Physical Therapy      2. Acute pain of left shoulder  Follow Up In Physical Therapy      3. Type 2 superior labrum extending from anterior to posterior (SLAP) lesion of left shoulder, sequela  Follow Up In Physical Therapy          PRECAUTIONS:  Fall Risk: None  (L) SLAP repair 1/30/2025 (protocol scanned)  Sling for 4 weeks, PROM, gentle AAROM and ISOMetrics; no resisted elbow flexion for 1 month    SUBJECTIVE:  Patient with some lingering soreness in the (L) shoulder but getting better with time. Using the LUE more for ADLs.  Pain level: 2/10  Compliant with HEP? Yes    OBJECTIVE:  (L) shoulder flexion PROM: 140 degrees  (L) shoulder ER @90: 50 degrees    TREATMENT:  - Therex:  UBE fwd/rev 3'/3', L5  Dowel mick flexion AAROM 2# x20  Dowel mick ER AAROM LUE x30  AAROM wallslides 2x20  Pulleys flexion 2x20, flex and scap each  Mid rows BlkTB 3x10  Resisted shoulder extensions GrTB 3x10  (L) shoulder ISOMetric IR walkouts GrTB 2x10  (L) shoulder ISOMetric ER walkouts RdTB 2x10  (L) shoulder ISOMetric ABD walkouts RdTB 2x10  Standing flexion AAROM, hand in hand, 2x10  Dowel mick extension x20  Dowel mick IR x20  ER stretch on wall 5\"x15 LUE    - Manual Therapy:  PROM (L) shoulder in all planes per protocol      ASSESSMENT:  Patient tolerated treatment well. He noticed some increased soreness with terminal stretching of the (L) shoulder, which is to be expected. Ongoing therapy needed to further progress ROM and introduce strengthening in the LUE.    EDUCATION:   Access Code: [email or web address]    PLAN:   Increase HEP next visit to include more forceful stretching of the (L) shoulder.    "

## 2025-03-07 ENCOUNTER — TREATMENT (OUTPATIENT)
Dept: PHYSICAL THERAPY | Facility: CLINIC | Age: 40
End: 2025-03-07
Payer: COMMERCIAL

## 2025-03-07 DIAGNOSIS — M25.512 ACUTE PAIN OF LEFT SHOULDER: ICD-10-CM

## 2025-03-07 DIAGNOSIS — S43.432S TYPE 2 SUPERIOR LABRUM EXTENDING FROM ANTERIOR TO POSTERIOR (SLAP) LESION OF LEFT SHOULDER, SEQUELA: ICD-10-CM

## 2025-03-07 DIAGNOSIS — Z98.890 STATUS POST LABRAL REPAIR OF SHOULDER: ICD-10-CM

## 2025-03-07 PROCEDURE — 97110 THERAPEUTIC EXERCISES: CPT | Mod: GP | Performed by: PHYSICAL THERAPIST

## 2025-03-07 PROCEDURE — 97140 MANUAL THERAPY 1/> REGIONS: CPT | Mod: GP | Performed by: PHYSICAL THERAPIST

## 2025-03-07 NOTE — PROGRESS NOTES
"Physical Therapy Treatment    Patient Name: Diony Marte  MRN: [id number]  Today's Date: 3/7/2025   Visit 9/40  Time Calculation  Start Time: 1418  Stop Time: 1520  Time Calculation (min): 62 min  1. Status post labral repair of shoulder  Follow Up In Physical Therapy      2. Acute pain of left shoulder  Follow Up In Physical Therapy      3. Type 2 superior labrum extending from anterior to posterior (SLAP) lesion of left shoulder, sequela  Follow Up In Physical Therapy          PRECAUTIONS:  Fall Risk: None  (L) SLAP repair 1/30/2025 (protocol scanned)  Sling for 4 weeks, PROM, gentle AAROM and ISOMetrics; no resisted elbow flexion for 1 month    SUBJECTIVE:  Patient reports increased soreness today as compared to his prior therapy session. HEP still going fairly well.  Pain level: 4/10  Compliant with HEP? Yes    OBJECTIVE:  (L) shoulder flexion PROM: 146 degrees  (L) shoulder ER @90: 45 degrees    TREATMENT:  - Therex:  UBE fwd/rev 3'/3', L5  Dowel mick flexion AAROM x20  Dowel mick ER AAROM LUE x30  AAROM wallslides 2x20  Pulleys flexion x20, flex and scap each  (R) sidelying (L) shoulder ER 3x10  Mid rows BlkTB 3x10  Resisted shoulder extensions GrTB 3x10  (L) shoulder ISOMetric IR walkouts BluTB 2x10  (L) shoulder ISOMetric ER walkouts GrTB 2x10  (L) shoulder ISOMetric ABD walkouts RdTB 2x10  Standing flexion AAROM, hand in hand, 2x10  Dowel mick extension x20  Dowel mick IR x20  ER stretch on wall 5\"x15 LUE    - Manual Therapy:  PROM (L) shoulder in all planes per protocol      ASSESSMENT:  Patient tolerated treatment well. He is improving steadily with ROM. Ongoing therapy will be needed to address limitations with ROM and weakness of the (L) shoulder.    EDUCATION:   Access Code: [email or web address]    PLAN:   Transition to IR stretch with strap    "

## 2025-03-11 ENCOUNTER — TREATMENT (OUTPATIENT)
Dept: PHYSICAL THERAPY | Facility: CLINIC | Age: 40
End: 2025-03-11
Payer: COMMERCIAL

## 2025-03-11 DIAGNOSIS — Z98.890 STATUS POST LABRAL REPAIR OF SHOULDER: ICD-10-CM

## 2025-03-11 DIAGNOSIS — S43.432S TYPE 2 SUPERIOR LABRUM EXTENDING FROM ANTERIOR TO POSTERIOR (SLAP) LESION OF LEFT SHOULDER, SEQUELA: ICD-10-CM

## 2025-03-11 DIAGNOSIS — M25.512 ACUTE PAIN OF LEFT SHOULDER: ICD-10-CM

## 2025-03-11 PROCEDURE — 97110 THERAPEUTIC EXERCISES: CPT | Mod: GP | Performed by: PHYSICAL THERAPIST

## 2025-03-11 PROCEDURE — 97140 MANUAL THERAPY 1/> REGIONS: CPT | Mod: GP | Performed by: PHYSICAL THERAPIST

## 2025-03-11 NOTE — PROGRESS NOTES
"Physical Therapy Treatment    Patient Name: Diony Marte  MRN: [id number]  Today's Date: 3/11/2025   Visit 10/40  Time Calculation  Start Time: 1035  Stop Time: 1125  Time Calculation (min): 50 min  1. Status post labral repair of shoulder  Follow Up In Physical Therapy      2. Acute pain of left shoulder  Follow Up In Physical Therapy      3. Type 2 superior labrum extending from anterior to posterior (SLAP) lesion of left shoulder, sequela  Follow Up In Physical Therapy          PRECAUTIONS:  Fall Risk: None  (L) SLAP repair 1/30/2025 (protocol scanned)  Sling for 4 weeks, PROM, gentle AAROM and ISOMetrics; no resisted elbow flexion for 1 month    SUBJECTIVE:  Patient states that his shoulder feels less sore today. Doing well overall.  Pain level: 4/10  Compliant with HEP? Yes    OBJECTIVE:  (L) shoulder flexion PROM: 148 degrees  (L) shoulder abduction PROM: 116 degrees  (L) shoulder ER @90: 50 degrees    TREATMENT:  - Therex:  UBE fwd/rev 3'/3', L5  Dowel mick flexion AAROM x20  Dowel mick ER AAROM LUE x30  AAROM wallslides 2x20  Pulleys flexion x20, flex and scap each  Mid rows BlkTB 3x12  Resisted shoulder extensions GrTB 3x12  (L) shoulder IR GrTB 2x10  (L) shoulder ER GrTB 2x10  Standing flexion AROM x10  Standing flexion AROM x10  Strap stretch for IR 10\"x10  ER stretch on wall 10\"x10 LUE    - Manual Therapy:  PROM (L) shoulder in all planes per protocol      ASSESSMENT:  Patient tolerated treatment well. He is improving steadily with ROM. Ongoing therapy needed to focus on full resolution of ROM and return of strength of the (L) shoulder.    EDUCATION:   Access Code: [email or web address]    PLAN:   Add AROM to HEP.   Continue stretching daily with HEP.    "

## 2025-03-13 ENCOUNTER — CLINICAL SUPPORT (OUTPATIENT)
Dept: IMMUNOLOGY | Facility: CLINIC | Age: 40
End: 2025-03-13
Payer: COMMERCIAL

## 2025-03-13 DIAGNOSIS — Z77.21 PERSONAL HISTORY OF EXPOSURE TO POTENTIALLY HAZARDOUS BODY FLUIDS: ICD-10-CM

## 2025-03-13 LAB
ALBUMIN SERPL BCP-MCNC: 4.7 G/DL (ref 3.4–5)
ANION GAP SERPL CALC-SCNC: 12 MMOL/L (ref 10–20)
BUN SERPL-MCNC: 20 MG/DL (ref 6–23)
CALCIUM SERPL-MCNC: 9.3 MG/DL (ref 8.6–10.6)
CHLORIDE SERPL-SCNC: 107 MMOL/L (ref 98–107)
CO2 SERPL-SCNC: 26 MMOL/L (ref 21–32)
CREAT SERPL-MCNC: 1.13 MG/DL (ref 0.5–1.3)
EGFRCR SERPLBLD CKD-EPI 2021: 86 ML/MIN/1.73M*2
GLUCOSE SERPL-MCNC: 110 MG/DL (ref 74–99)
HIV 1+2 AB+HIV1 P24 AG SERPL QL IA: NONREACTIVE
PHOSPHATE SERPL-MCNC: 3.2 MG/DL (ref 2.5–4.9)
POTASSIUM SERPL-SCNC: 4.5 MMOL/L (ref 3.5–5.3)
SODIUM SERPL-SCNC: 140 MMOL/L (ref 136–145)
TREPONEMA PALLIDUM IGG+IGM AB [PRESENCE] IN SERUM OR PLASMA BY IMMUNOASSAY: NONREACTIVE

## 2025-03-13 PROCEDURE — 80069 RENAL FUNCTION PANEL: CPT

## 2025-03-13 PROCEDURE — 87491 CHLMYD TRACH DNA AMP PROBE: CPT

## 2025-03-13 PROCEDURE — 86780 TREPONEMA PALLIDUM: CPT

## 2025-03-13 PROCEDURE — 36415 COLL VENOUS BLD VENIPUNCTURE: CPT

## 2025-03-13 PROCEDURE — 87389 HIV-1 AG W/HIV-1&-2 AB AG IA: CPT

## 2025-03-14 ENCOUNTER — TREATMENT (OUTPATIENT)
Dept: PHYSICAL THERAPY | Facility: CLINIC | Age: 40
End: 2025-03-14
Payer: COMMERCIAL

## 2025-03-14 DIAGNOSIS — Z98.890 STATUS POST LABRAL REPAIR OF SHOULDER: ICD-10-CM

## 2025-03-14 DIAGNOSIS — Z77.21 PERSONAL HISTORY OF EXPOSURE TO POTENTIALLY HAZARDOUS BODY FLUIDS: ICD-10-CM

## 2025-03-14 DIAGNOSIS — S43.432S TYPE 2 SUPERIOR LABRUM EXTENDING FROM ANTERIOR TO POSTERIOR (SLAP) LESION OF LEFT SHOULDER, SEQUELA: ICD-10-CM

## 2025-03-14 DIAGNOSIS — M25.512 ACUTE PAIN OF LEFT SHOULDER: ICD-10-CM

## 2025-03-14 PROCEDURE — 97140 MANUAL THERAPY 1/> REGIONS: CPT | Mod: GP | Performed by: PHYSICAL THERAPIST

## 2025-03-14 PROCEDURE — 97110 THERAPEUTIC EXERCISES: CPT | Mod: GP | Performed by: PHYSICAL THERAPIST

## 2025-03-14 RX ORDER — EMTRICITABINE AND TENOFOVIR ALAFENAMIDE 200; 25 MG/1; MG/1
1 TABLET ORAL DAILY
Qty: 30 TABLET | Refills: 2 | Status: CANCELLED | OUTPATIENT
Start: 2025-03-14

## 2025-03-14 NOTE — PROGRESS NOTES
"Physical Therapy Treatment    Patient Name: Diony Marte  MRN: [id number]  Today's Date: 3/14/2025   Visit 11/40  Time Calculation  Start Time: 1500  Stop Time: 1550  Time Calculation (min): 50 min  1. Status post labral repair of shoulder  Follow Up In Physical Therapy      2. Acute pain of left shoulder  Follow Up In Physical Therapy      3. Type 2 superior labrum extending from anterior to posterior (SLAP) lesion of left shoulder, sequela  Follow Up In Physical Therapy          PRECAUTIONS:  Fall Risk: None  (L) SLAP repair 1/30/2025 (protocol scanned)  Sling for 4 weeks, PROM, gentle AAROM and ISOMetrics; no resisted elbow flexion for 1 month    SUBJECTIVE:  Patient reports ongoing soreness in the (L) shoulder. The soreness is mostly located along the front of the (L) shoulder, over the biceps tendon area. Also having some slight lateral triceps pain.  Pain level: 4/10  Compliant with HEP? Yes    OBJECTIVE:  (L) shoulder flexion PROM: 144 degrees  (L) shoulder abduction PROM: 123 degrees  (L) shoulder ER @90: 50 degrees    TREATMENT:  - Therex:  UBE fwd/rev 3'/3', L5  Dowel mick flexion AAROM x20  Dowel mick ER AAROM LUE x30  AAROM wallslides 2x20  Sleeper stretch 5\"x15 IR/ER  Pulleys flexion x20, flex and scap each  Mid rows BlkTB 3x12  Resisted shoulder extensions GrTB 3x12  (L) shoulder IR BluTB 2x10  (L) shoulder ER GrTB 2x10  Standing flexion AROM x10  Standing flexion AROM x10  Strap stretch for IR 10\"x10  ER stretch on wall 10\"x10 LUE    - Manual Therapy:  PROM (L) shoulder in all planes per protocol      ASSESSMENT:  Patient tolerated treatment well. He ROM is still slightly tight but seemingly improving with time. Ongoing therapy will be crucial to continue PROM/AAROM and AROM with progression to strengthening.     EDUCATION:   Access Code: [email or web address]    PLAN:   Add AROM to HEP next visit as it was held secondary to soreness.  Continue stretching daily with HEP - encouraged to lean into stretching to " increase aggressiveness.

## 2025-03-18 ENCOUNTER — TREATMENT (OUTPATIENT)
Dept: PHYSICAL THERAPY | Facility: CLINIC | Age: 40
End: 2025-03-18
Payer: COMMERCIAL

## 2025-03-18 DIAGNOSIS — S43.432S TYPE 2 SUPERIOR LABRUM EXTENDING FROM ANTERIOR TO POSTERIOR (SLAP) LESION OF LEFT SHOULDER, SEQUELA: ICD-10-CM

## 2025-03-18 DIAGNOSIS — Z98.890 STATUS POST LABRAL REPAIR OF SHOULDER: ICD-10-CM

## 2025-03-18 DIAGNOSIS — M25.512 ACUTE PAIN OF LEFT SHOULDER: ICD-10-CM

## 2025-03-18 PROCEDURE — 97140 MANUAL THERAPY 1/> REGIONS: CPT | Mod: GP | Performed by: PHYSICAL THERAPIST

## 2025-03-18 PROCEDURE — 97110 THERAPEUTIC EXERCISES: CPT | Mod: GP | Performed by: PHYSICAL THERAPIST

## 2025-03-18 NOTE — PROGRESS NOTES
"Physical Therapy Treatment    Patient Name: Diony Marte  MRN: [id number]  Today's Date: 3/18/2025   Visit 12/40     1. Status post labral repair of shoulder  Follow Up In Physical Therapy      2. Acute pain of left shoulder  Follow Up In Physical Therapy      3. Type 2 superior labrum extending from anterior to posterior (SLAP) lesion of left shoulder, sequela  Follow Up In Physical Therapy          PRECAUTIONS:  Fall Risk: None  (L) SLAP repair 1/30/2025 (protocol scanned)  Sling for 4 weeks, PROM, gentle AAROM and ISOMetrics; no resisted elbow flexion for 1 month    SUBJECTIVE:  Patient reports that the (L) shoulder is feeling great. No complaints. Some tightness overall.  Pain level: 2/10  Compliant with HEP? Yes    OBJECTIVE:  (L) shoulder flexion PROM: 150 degrees  (L) shoulder abduction PROM: 128 degrees  (L) shoulder ER @90: 60 degrees    TREATMENT:  - Therex:  UBE fwd/rev 3'/3', L5  AAROM wallslides 2x20  Sleeper stretch 5\"x15 IR/ER  Pulleys flexion x20, flex and scap each  Mid rows BlkTB 3x12  Resisted shoulder extensions GrTB 3x12  (L) shoulder IR BluTB 2x15  (L) shoulder ER GrTB 2x15  Standing flexion AROM 2x10  Standing flexion AROM 2x10  Strap stretch for IR 10\"x10  ER stretch on wall 10\"x10 LUE  Biceps curl 5# 2x10  Wall push ups 2x10    - Manual Therapy:  PROM (L) shoulder in all planes per protocol      ASSESSMENT:  Patient tolerated treatment well. He ROM improving. HEP progressed today to further progress motion with time. Ongoing therapy will be crucial to continue PROM/AAROM and AROM with progression to strengthening.     EDUCATION:  Access Code: [email or web address]  URL: [email or web address]  Date: 03/18/2025  Prepared by: Jorge Green    Exercises  - Seated Shoulder Flexion AAROM with Pulley Behind  - 1-2 x daily - 7 x weekly - 3 sets - 10 reps  - Seated Shoulder Scaption AAROM with Pulley at Side  - 1-2 x daily - 7 x weekly - 3 sets - 10 reps  - Shoulder Flexion Wall Slide " with Towel  - 1-2 x daily - 7 x weekly - 2-3 sets - 10 reps  - Standing Shoulder Abduction Slides at Wall  - 1-2 x daily - 7 x weekly - 3 sets - 10 reps  - Standing Shoulder Flexion Full Range Single Arm  - 1-2 x daily - 7 x weekly - 3 sets - 10 reps  - Standing Shoulder Abduction Full Range Single Arm  - 1-2 x daily - 7 x weekly - 3 sets - 10 reps  - Standing Shoulder Row with Anchored Resistance  - 1-2 x daily - 7 x weekly - 3 sets - 10 reps  - Shoulder extension with resistance - Neutral  - 1-2 x daily - 7 x weekly - 3 sets - 10 reps  - Shoulder ER Stretch in Abduction  - 1-2 x daily - 7 x weekly - 1 sets - 10 reps - 10 sec hold  - Standing Shoulder Internal Rotation Stretch with Dowel  - 1-2 x daily - 7 x weekly - 1 sets - 10 reps - 10 sec hold    PLAN:   Continue with post-operative protocol as tolerated.

## 2025-03-21 ENCOUNTER — APPOINTMENT (OUTPATIENT)
Dept: PHYSICAL THERAPY | Facility: CLINIC | Age: 40
End: 2025-03-21
Payer: COMMERCIAL

## 2025-03-21 DIAGNOSIS — B00.1 RECURRENT COLD SORES: ICD-10-CM

## 2025-03-21 DIAGNOSIS — Z98.890 STATUS POST LABRAL REPAIR OF SHOULDER: ICD-10-CM

## 2025-03-21 DIAGNOSIS — L65.9 ALOPECIA: ICD-10-CM

## 2025-03-24 ENCOUNTER — TREATMENT (OUTPATIENT)
Dept: PHYSICAL THERAPY | Facility: CLINIC | Age: 40
End: 2025-03-24
Payer: COMMERCIAL

## 2025-03-24 DIAGNOSIS — S43.432S TYPE 2 SUPERIOR LABRUM EXTENDING FROM ANTERIOR TO POSTERIOR (SLAP) LESION OF LEFT SHOULDER, SEQUELA: ICD-10-CM

## 2025-03-24 DIAGNOSIS — Z98.890 STATUS POST LABRAL REPAIR OF SHOULDER: ICD-10-CM

## 2025-03-24 DIAGNOSIS — M25.512 ACUTE PAIN OF LEFT SHOULDER: ICD-10-CM

## 2025-03-24 PROCEDURE — 97110 THERAPEUTIC EXERCISES: CPT | Mod: GP | Performed by: PHYSICAL THERAPIST

## 2025-03-24 PROCEDURE — 97140 MANUAL THERAPY 1/> REGIONS: CPT | Mod: GP | Performed by: PHYSICAL THERAPIST

## 2025-03-24 NOTE — PROGRESS NOTES
"Physical Therapy Treatment    Patient Name: Diony Marte  MRN: [id number]  Today's Date: 3/24/2025   Visit 13/40  Time Calculation  Start Time: 1425  Stop Time: 1513  Time Calculation (min): 48 min  1. Status post labral repair of shoulder  Follow Up In Physical Therapy      2. Acute pain of left shoulder  Follow Up In Physical Therapy      3. Type 2 superior labrum extending from anterior to posterior (SLAP) lesion of left shoulder, sequela  Follow Up In Physical Therapy          PRECAUTIONS:  Fall Risk: None  (L) SLAP repair 1/30/2025 (protocol scanned)  Sling for 4 weeks, PROM, gentle AAROM and ISOMetrics; no resisted elbow flexion for 1 month    SUBJECTIVE:  Patient reports that the (L) shoulder is feeling much better. Some very light soreness but overall doing well. Was able to sleep in his (L) side for about half of the night before waking due to increased soreness.  Pain level: 2/10  Compliant with HEP? Yes    OBJECTIVE:  (L) shoulder flexion PROM: 158 degrees  (L) shoulder abduction PROM: 140 degrees  (L) shoulder ER @90: 65 degrees    TREATMENT:  - Therex:  UBE fwd/rev 3'/3', L5  AAROM wallslides 2x20  Sleeper stretch 5\"x15 IR/ER  Pulleys flexion x20, flex and scap each  Mid rows BlkTB 3x12  Resisted shoulder extensions GrTB 3x12  (L) shoulder IR BluTB 2x15  (L) shoulder ER BluTB 2x15  Standing flexion AROM 2x12  Standing flexion AROM 2x12  Strap stretch for IR 10\"x10  ER stretch on wall 10\"x10 LUE  Biceps curl 6# 3x10  Wall push ups 3x10    - Manual Therapy:  PROM (L) shoulder in all planes per protocol      ASSESSMENT:  Patient tolerated treatment well. His ROM is not 100% yet but improving with each visit. Ongoing therapy will ensure full restoration of motion and well as steady progression of strength of the (L) shoulder.    EDUCATION:  Access Code: [email or web address]    PLAN:   Add weight to shoulder flexion and abduction next week for progression to strengthening phase of protocol.    "

## 2025-03-26 ENCOUNTER — TREATMENT (OUTPATIENT)
Dept: PHYSICAL THERAPY | Facility: CLINIC | Age: 40
End: 2025-03-26
Payer: COMMERCIAL

## 2025-03-26 ENCOUNTER — OFFICE VISIT (OUTPATIENT)
Dept: ORTHOPEDIC SURGERY | Facility: HOSPITAL | Age: 40
End: 2025-03-26
Payer: COMMERCIAL

## 2025-03-26 DIAGNOSIS — S43.432S TYPE 2 SUPERIOR LABRUM EXTENDING FROM ANTERIOR TO POSTERIOR (SLAP) LESION OF LEFT SHOULDER, SEQUELA: Primary | ICD-10-CM

## 2025-03-26 DIAGNOSIS — S43.432S TYPE 2 SUPERIOR LABRUM EXTENDING FROM ANTERIOR TO POSTERIOR (SLAP) LESION OF LEFT SHOULDER, SEQUELA: ICD-10-CM

## 2025-03-26 DIAGNOSIS — Z98.890 STATUS POST LABRAL REPAIR OF SHOULDER: ICD-10-CM

## 2025-03-26 DIAGNOSIS — M25.512 ACUTE PAIN OF LEFT SHOULDER: ICD-10-CM

## 2025-03-26 PROCEDURE — 99211 OFF/OP EST MAY X REQ PHY/QHP: CPT | Performed by: ORTHOPAEDIC SURGERY

## 2025-03-26 PROCEDURE — 97110 THERAPEUTIC EXERCISES: CPT | Mod: GP | Performed by: PHYSICAL THERAPIST

## 2025-03-26 PROCEDURE — 97140 MANUAL THERAPY 1/> REGIONS: CPT | Mod: GP | Performed by: PHYSICAL THERAPIST

## 2025-03-26 RX ORDER — FINASTERIDE 5 MG/1
TABLET, FILM COATED ORAL
Qty: 23 TABLET | Refills: 0 | Status: SHIPPED | OUTPATIENT
Start: 2025-03-26

## 2025-03-26 RX ORDER — VALACYCLOVIR HYDROCHLORIDE 1 G/1
TABLET, FILM COATED ORAL
Qty: 40 TABLET | Refills: 0 | Status: SHIPPED | OUTPATIENT
Start: 2025-03-26

## 2025-03-26 ASSESSMENT — PAIN SCALES - GENERAL: PAINLEVEL_OUTOF10: 2

## 2025-03-26 ASSESSMENT — PAIN - FUNCTIONAL ASSESSMENT: PAIN_FUNCTIONAL_ASSESSMENT: 0-10

## 2025-03-26 NOTE — PROGRESS NOTES
"Physical Therapy Treatment    Patient Name: Diony Marte  MRN: [id number]  Today's Date: 3/26/2025   Visit 14/40  Time Calculation  Start Time: 1420  Stop Time: 1458  Time Calculation (min): 38 min  1. Status post labral repair of shoulder  Follow Up In Physical Therapy      2. Acute pain of left shoulder  Follow Up In Physical Therapy      3. Type 2 superior labrum extending from anterior to posterior (SLAP) lesion of left shoulder, sequela  Follow Up In Physical Therapy          PRECAUTIONS:  Fall Risk: None  (L) SLAP repair 1/30/2025 (protocol scanned)  Sling for 4 weeks, PROM, gentle AAROM and ISOMetrics; no resisted elbow flexion for 1 month    SUBJECTIVE:  Patient reports that the (L) shoulder is sore today, citing that it seemed to start after his last therapy visit. The soreness seems to worsen with movement and subside with rest. Saw the surgeon today whom was pleased with his progress at this time  Pain level: 3-4/10  Compliant with HEP? Yes    OBJECTIVE:  (L) shoulder flexion PROM: 152 degrees  (L) shoulder abduction PROM: 137 degrees  (L) shoulder ER @90: 58 degrees    TREATMENT:  - Therex:  UBE fwd/rev 3'/3', L5  AAROM wallslides flex/abd x20 each  Sleeper stretch 5\"x15 IR/ER - held secondary to soreness  Pulleys flexion 2x20, flex and scap each  Mid rows BlkTB 3x12  Resisted shoulder extensions GrTB 3x12  (L) shoulder IR BluTB 2x15  (L) shoulder ER BluTB 2x15  Standing flexion AROM 2x12  Standing abduction AROM 2x12  Strap stretch for IR 10\"x10  ER stretch on wall 10\"x10 LUE  - held secondary to soreness  Biceps curl 6# 3x10  Wall push ups 3x10    - Manual Therapy:  PROM (L) shoulder in all planes per protocol      ASSESSMENT:  Pain with ROM at end range today resulted in decreased ROM measurements, which is to be expected secondary to increased soreness. Patient advised to use cryotherapy more frequently and stretch very lightly until the soreness calms down.    EDUCATION:  Access Code: " [email or web address]    PLAN:   Add weight to shoulder flexion and abduction next week for progression, if pain/soreness dwindles.

## 2025-03-26 NOTE — PROGRESS NOTES
Patient is here for follow-up on left shoulder SLAP lesion repair he has still some anterior and lateral subdeltoid pain.  On exam he can elevate to 90 degrees but inhibited above 90 degrees from pain.  Forward flexion 100 degrees bicep flexion strength 4/5 with no pain.    SLAP lesion left shoulder patient is progressing along the anticipated course over like to see him again in another month if he has any residual pain would consider subacromial injection.        This was dictated using voice recognition software and not corrected for grammatical or spelling errors.  
left foot dec sensation

## 2025-03-31 ENCOUNTER — TREATMENT (OUTPATIENT)
Dept: PHYSICAL THERAPY | Facility: CLINIC | Age: 40
End: 2025-03-31
Payer: COMMERCIAL

## 2025-03-31 DIAGNOSIS — Z98.890 STATUS POST LABRAL REPAIR OF SHOULDER: ICD-10-CM

## 2025-03-31 DIAGNOSIS — S43.432S TYPE 2 SUPERIOR LABRUM EXTENDING FROM ANTERIOR TO POSTERIOR (SLAP) LESION OF LEFT SHOULDER, SEQUELA: ICD-10-CM

## 2025-03-31 DIAGNOSIS — M25.512 ACUTE PAIN OF LEFT SHOULDER: ICD-10-CM

## 2025-03-31 PROCEDURE — 97110 THERAPEUTIC EXERCISES: CPT | Mod: GP | Performed by: PHYSICAL THERAPIST

## 2025-03-31 PROCEDURE — 97140 MANUAL THERAPY 1/> REGIONS: CPT | Mod: GP | Performed by: PHYSICAL THERAPIST

## 2025-03-31 NOTE — PROGRESS NOTES
"Physical Therapy Treatment    Patient Name: Diony Marte  MRN: [id number]  Today's Date: 3/31/2025   Visit 14/40  Time Calculation  Start Time: 1415  Stop Time: 1501  Time Calculation (min): 46 min  1. Status post labral repair of shoulder  Follow Up In Physical Therapy      2. Acute pain of left shoulder  Follow Up In Physical Therapy      3. Type 2 superior labrum extending from anterior to posterior (SLAP) lesion of left shoulder, sequela  Follow Up In Physical Therapy          PRECAUTIONS:  Fall Risk: None  (L) SLAP repair 1/30/2025 (protocol scanned)  Sling for 4 weeks, PROM, gentle AAROM and ISOMetrics; no resisted elbow flexion for 1 month    SUBJECTIVE:  Patient reports that the (L) shoulder feels pretty good. Was able to do some light yard work without increase in pain.  Pain level: 1-2/10  Compliant with HEP? Yes    OBJECTIVE:  (L) shoulder flexion PROM: 160 degrees  (L) shoulder abduction PROM: 141 degrees  (L) shoulder ER @90: 60 degrees    TREATMENT:  - Therex:  UBE fwd/rev 3'/3', L6  AAROM wallslides flex/abd x20 each  Sleeper stretch 5\"x15 IR/ER  Pulleys flexion x20, flex and scap each  Mid rows 10# on cable column 2x15  Resisted shoulder extensions 7.5# cable column 2x15  (L) shoulder IR BluTB 2x15  (L) shoulder ER BluTB 2x15  Standing flexion AROM 2x12 1#  Standing abduction AROM 2x12 1#  Strap stretch for IR 10\"x10  ER stretch on wall 10\"x10 LUE  Biceps curl 7# 3x10  Wall push ups 3x10  Standing shoulder alphabet x2    - Manual Therapy:  PROM (L) shoulder in all planes per protocol      ASSESSMENT:  Patient with near full return of ROM to the (L) shoulder. He is exhibiting increase in overall mobility of the GH joint on the LUE but will require ongoing care to maximize functional mobility and progress strength as tolerated.    EDUCATION:  Access Code: [email or web address]    PLAN:   Continue with PROM, AAROM and progressive strengthening as tolerated.  Add in rhythmic stabilization at next visit.    "

## 2025-04-02 ENCOUNTER — TREATMENT (OUTPATIENT)
Dept: PHYSICAL THERAPY | Facility: CLINIC | Age: 40
End: 2025-04-02
Payer: COMMERCIAL

## 2025-04-02 DIAGNOSIS — S43.432S TYPE 2 SUPERIOR LABRUM EXTENDING FROM ANTERIOR TO POSTERIOR (SLAP) LESION OF LEFT SHOULDER, SEQUELA: ICD-10-CM

## 2025-04-02 DIAGNOSIS — M25.512 ACUTE PAIN OF LEFT SHOULDER: ICD-10-CM

## 2025-04-02 DIAGNOSIS — Z98.890 STATUS POST LABRAL REPAIR OF SHOULDER: ICD-10-CM

## 2025-04-02 PROCEDURE — 97140 MANUAL THERAPY 1/> REGIONS: CPT | Mod: GP | Performed by: PHYSICAL THERAPIST

## 2025-04-02 PROCEDURE — 97110 THERAPEUTIC EXERCISES: CPT | Mod: GP | Performed by: PHYSICAL THERAPIST

## 2025-04-02 NOTE — PROGRESS NOTES
"Physical Therapy Treatment    Patient Name: Diony Marte  MRN: [id number]  Today's Date: 4/2/2025   Visit 15/40  Time Calculation  Start Time: 1415  Stop Time: 1457  Time Calculation (min): 42 min  1. Status post labral repair of shoulder  Follow Up In Physical Therapy      2. Acute pain of left shoulder  Follow Up In Physical Therapy      3. Type 2 superior labrum extending from anterior to posterior (SLAP) lesion of left shoulder, sequela  Follow Up In Physical Therapy          PRECAUTIONS:  Fall Risk: None  (L) SLAP repair 1/30/2025 (protocol scanned)  Sling for 4 weeks, PROM, gentle AAROM and ISOMetrics; no resisted elbow flexion for 1 month    SUBJECTIVE:  Patient denies any pain or discomfort in the (L) shoulder. Getting better with time.  Pain level: 1-2/10  Compliant with HEP? Yes    OBJECTIVE:  (L) shoulder flexion PROM: 165 degrees  (L) shoulder abduction PROM: 150 degrees  (L) shoulder ER @90: 65 degrees    TREATMENT:  - Therex:  UBE fwd/rev 3'/3', L6  AAROM wallslides flex/abd x20 each  Sleeper stretch 5\"x15 IR/ER  Pulleys flexion x20, flex and scap each  Mid rows 13.25# on cable column 2x15  Resisted shoulder extensions 8.25# cable column 2x15  (L) shoulder IR BluTB 2x15  (L) shoulder ER BluTB 2x15  Standing flexion AROM 2x12 2#  Standing abduction AROM 2x12 2#  Strap stretch for IR 10\"x10  ER stretch on wall 10\"x10 LUE  Biceps curl 7# 3x10  Wall push ups 3x10  Standing shoulder alphabet x2    - Manual Therapy:  PROM (L) shoulder in all planes per protocol      ASSESSMENT:  Patient still making steady progress with ROM and tolerating steady strength progression. Ongoing therapy will be necessary to further progress strength and have patient return to prior level of function.    EDUCATION:  Access Code: [email or web address]    PLAN:   Continue with PROM, AAROM and progressive strengthening as tolerated.  Add in rhythmic stabilization at next visit as it was not performed today.    "

## 2025-04-07 ENCOUNTER — TREATMENT (OUTPATIENT)
Dept: PHYSICAL THERAPY | Facility: CLINIC | Age: 40
End: 2025-04-07
Payer: COMMERCIAL

## 2025-04-07 DIAGNOSIS — M25.512 ACUTE PAIN OF LEFT SHOULDER: ICD-10-CM

## 2025-04-07 DIAGNOSIS — Z98.890 STATUS POST LABRAL REPAIR OF SHOULDER: ICD-10-CM

## 2025-04-07 DIAGNOSIS — S43.432S TYPE 2 SUPERIOR LABRUM EXTENDING FROM ANTERIOR TO POSTERIOR (SLAP) LESION OF LEFT SHOULDER, SEQUELA: ICD-10-CM

## 2025-04-07 PROCEDURE — 97140 MANUAL THERAPY 1/> REGIONS: CPT | Mod: GP | Performed by: PHYSICAL THERAPIST

## 2025-04-07 PROCEDURE — 97110 THERAPEUTIC EXERCISES: CPT | Mod: GP | Performed by: PHYSICAL THERAPIST

## 2025-04-07 NOTE — PROGRESS NOTES
"Physical Therapy Treatment    Patient Name: Diony Marte  MRN: [id number]  Today's Date: 4/7/2025   Visit 16/40  Time Calculation  Start Time: 1410  Stop Time: 1455  Time Calculation (min): 45 min  1. Status post labral repair of shoulder  Follow Up In Physical Therapy      2. Acute pain of left shoulder  Follow Up In Physical Therapy      3. Type 2 superior labrum extending from anterior to posterior (SLAP) lesion of left shoulder, sequela  Follow Up In Physical Therapy          PRECAUTIONS:  Fall Risk: None  (L) SLAP repair 1/30/2025 (protocol scanned)  Sling for 4 weeks, PROM, gentle AAROM and ISOMetrics; no resisted elbow flexion for 1 month    SUBJECTIVE:  Patient reports the (L) shoulder feels better on some days as compared to others. Overall, he notes improvement. HEP still going well.  Pain level: 1-2/10  Compliant with HEP? Yes    OBJECTIVE:  (L) shoulder flexion PROM: >170 degrees  (L) shoulder ER @90: 80 degrees    TREATMENT:  - Therex:  UBE fwd/rev 3'/3', L6  AAROM wallslides flex/abd x20 each  Sleeper stretch 5\"x15 IR/ER  Pulleys flexion x20, flex and scap each  Mid rows 13.25# on cable column 2x15  Resisted shoulder extensions 8.25# cable column 2x15  (L) shoulder IR BlkTB 2x15  (L) shoulder ER BluTB 2x15  Standing flexion AROM 2x12 3#  Standing abduction AROM 2x12 3#  Strap stretch for IR 10\"x10  ER stretch on wall 10\"x10 LUE  Biceps curl 7# 3x10  Wall push ups 3x10  Standing shoulder alphabet x1, 1#  Bodyblade   IR/ER 20\"x3 IR/ER   H ABD/ADD 20\"x3    - Manual Therapy:  PROM (L) shoulder in all planes per protocol      ASSESSMENT:  Patient with good tolerance to exercise on this date. He is making significant gains with ROM and strength. Ongoing therapy will be beneficial to return to pre-surgical status including lifting weight recreationally.    EDUCATION:  Access Code: [email or web address]    PLAN:   Continue with therapy focusing on regaining the last 10% of PROM in the (L) shoulder.  Progressive " strengthening should also be focused upon.

## 2025-04-08 DIAGNOSIS — K21.9 GASTROESOPHAGEAL REFLUX DISEASE, UNSPECIFIED WHETHER ESOPHAGITIS PRESENT: ICD-10-CM

## 2025-04-09 ENCOUNTER — APPOINTMENT (OUTPATIENT)
Dept: PHYSICAL THERAPY | Facility: CLINIC | Age: 40
End: 2025-04-09
Payer: COMMERCIAL

## 2025-04-09 DIAGNOSIS — Z98.890 STATUS POST LABRAL REPAIR OF SHOULDER: ICD-10-CM

## 2025-04-09 DIAGNOSIS — Z77.21 PERSONAL HISTORY OF EXPOSURE TO POTENTIALLY HAZARDOUS BODY FLUIDS: ICD-10-CM

## 2025-04-10 DIAGNOSIS — Z77.21 PERSONAL HISTORY OF EXPOSURE TO POTENTIALLY HAZARDOUS BODY FLUIDS: ICD-10-CM

## 2025-04-10 RX ORDER — DOXYCYCLINE HYCLATE 100 MG
TABLET ORAL
Qty: 30 TABLET | Refills: 23 | OUTPATIENT
Start: 2025-04-10

## 2025-04-10 RX ORDER — DOXYCYCLINE HYCLATE 100 MG
TABLET ORAL
Qty: 30 TABLET | Refills: 0 | Status: SHIPPED | OUTPATIENT
Start: 2025-04-10

## 2025-04-11 ENCOUNTER — APPOINTMENT (OUTPATIENT)
Dept: UROLOGY | Facility: HOSPITAL | Age: 40
End: 2025-04-11
Payer: COMMERCIAL

## 2025-04-11 RX ORDER — ESOMEPRAZOLE MAGNESIUM 40 MG/1
40 CAPSULE, DELAYED RELEASE ORAL
Qty: 90 CAPSULE | Refills: 3 | OUTPATIENT
Start: 2025-04-11

## 2025-04-14 ENCOUNTER — APPOINTMENT (OUTPATIENT)
Dept: PHYSICAL THERAPY | Facility: CLINIC | Age: 40
End: 2025-04-14
Payer: COMMERCIAL

## 2025-04-14 DIAGNOSIS — Z98.890 STATUS POST LABRAL REPAIR OF SHOULDER: ICD-10-CM

## 2025-04-15 ENCOUNTER — APPOINTMENT (OUTPATIENT)
Dept: PRIMARY CARE | Facility: CLINIC | Age: 40
End: 2025-04-15
Payer: COMMERCIAL

## 2025-04-16 ENCOUNTER — TREATMENT (OUTPATIENT)
Dept: PHYSICAL THERAPY | Facility: CLINIC | Age: 40
End: 2025-04-16
Payer: COMMERCIAL

## 2025-04-16 DIAGNOSIS — S43.432S TYPE 2 SUPERIOR LABRUM EXTENDING FROM ANTERIOR TO POSTERIOR (SLAP) LESION OF LEFT SHOULDER, SEQUELA: ICD-10-CM

## 2025-04-16 DIAGNOSIS — M25.512 ACUTE PAIN OF LEFT SHOULDER: ICD-10-CM

## 2025-04-16 DIAGNOSIS — Z98.890 STATUS POST LABRAL REPAIR OF SHOULDER: ICD-10-CM

## 2025-04-16 PROCEDURE — 97110 THERAPEUTIC EXERCISES: CPT | Mod: GP | Performed by: PHYSICAL THERAPIST

## 2025-04-16 PROCEDURE — 97140 MANUAL THERAPY 1/> REGIONS: CPT | Mod: GP | Performed by: PHYSICAL THERAPIST

## 2025-04-16 NOTE — PROGRESS NOTES
"Physical Therapy Treatment    Patient Name: Diony Marte  MRN: [id number]  Today's Date: 4/16/2025   Visit 17/40  Time Calculation  Start Time: 1416  Stop Time: 1509  Time Calculation (min): 53 min  1. Status post labral repair of shoulder  Follow Up In Physical Therapy      2. Acute pain of left shoulder  Follow Up In Physical Therapy      3. Type 2 superior labrum extending from anterior to posterior (SLAP) lesion of left shoulder, sequela  Follow Up In Physical Therapy          PRECAUTIONS:  Fall Risk: None  (L) SLAP repair 1/30/2025 (protocol scanned)  Sling for 4 weeks, PROM, gentle AAROM and ISOMetrics; no resisted elbow flexion for 1 month    SUBJECTIVE:  Patient with some reports of mild resting soreness in the (L) shoulder today. No specific pain. Was on vacation last week and did not exercise as much as he would have liked to. Still feels constant improvement in movement and function of the (L) shoulder.  Pain level: 1-2/10  Compliant with HEP? Yes    OBJECTIVE:  (L) shoulder flexion PROM: 175 degrees  (L) shoulder ER @90: 80 degrees    TREATMENT:  - Therex:  UBE fwd/rev 3'/3', L8  AAROM wallslides flex/abd x20 each  Sleeper stretch 5\"x15 IR/ER  Mid rows 13.25# on cable column 3x12  Resisted shoulder extensions 7.5# cable column 3x12  (L) shoulder IR BlkTB 2x15  (L) shoulder ER BluTB 2x15  Standing flexion AROM 3x10 3#  Standing abduction AROM 3x10 3#  Strap stretch for IR 10\"x10  ER stretch on wall 10\"x10 LUE  Biceps curl 7# 3x10  Wall push ups 3x15  Standing shoulder alphabet x2, 1#  Bodyblade   IR/ER 20\"x3 IR/ER   H ABD/ADD 20\"x3    - Manual Therapy:  PROM (L) shoulder in all planes per protocol      ASSESSMENT:  Patient nearly full with ROM of the (L) shoulder but still slightly limited with end range rotation. Strength slowly progressing. No complications with recovery and progressing as expected.    EDUCATION:  Access Code: [email or web address]    PLAN:   Add in overhead press next visit as well as D2 " flexion next visit.

## 2025-04-18 ENCOUNTER — TREATMENT (OUTPATIENT)
Dept: PHYSICAL THERAPY | Facility: CLINIC | Age: 40
End: 2025-04-18
Payer: COMMERCIAL

## 2025-04-18 DIAGNOSIS — S43.432S TYPE 2 SUPERIOR LABRUM EXTENDING FROM ANTERIOR TO POSTERIOR (SLAP) LESION OF LEFT SHOULDER, SEQUELA: ICD-10-CM

## 2025-04-18 DIAGNOSIS — M25.512 ACUTE PAIN OF LEFT SHOULDER: ICD-10-CM

## 2025-04-18 DIAGNOSIS — Z98.890 STATUS POST LABRAL REPAIR OF SHOULDER: ICD-10-CM

## 2025-04-18 PROCEDURE — 97140 MANUAL THERAPY 1/> REGIONS: CPT | Mod: GP | Performed by: PHYSICAL THERAPIST

## 2025-04-18 PROCEDURE — 97110 THERAPEUTIC EXERCISES: CPT | Mod: GP | Performed by: PHYSICAL THERAPIST

## 2025-04-18 NOTE — PROGRESS NOTES
"Physical Therapy Treatment    Patient Name: Diony Marte  MRN: [id number]  Today's Date: 4/18/2025   Visit 17/40  Time Calculation  Start Time: 1411  Stop Time: 1451  Time Calculation (min): 40 min  1. Status post labral repair of shoulder  Follow Up In Physical Therapy      2. Acute pain of left shoulder  Follow Up In Physical Therapy      3. Type 2 superior labrum extending from anterior to posterior (SLAP) lesion of left shoulder, sequela  Follow Up In Physical Therapy          PRECAUTIONS:  Fall Risk: None  (L) SLAP repair 1/30/2025 (protocol scanned)  Sling for 4 weeks, PROM, gentle AAROM and ISOMetrics; no resisted elbow flexion for 1 month    SUBJECTIVE:  Patient states that his (L) shoulder is more achy today. The joint seems to be painful. No idea why the increase in soreness  Pain level: 1-2/10  Compliant with HEP? Yes    OBJECTIVE:  (L) shoulder flexion PROM: 175 degrees  (L) shoulder ER @90: 80 degrees    TREATMENT:  - Therex:  UBE fwd/rev 3'/3', L8  AAROM wallslides flex/abd x20 each  Sleeper stretch 5\"x15 IR/ER  Mid rows 13.25# on cable column 3x12  Resisted shoulder extensions 8.25# cable column x17  (L) shoulder IR BlkTB 2x15  (L) shoulder ER GrTB 2x15  Standing flexion AROM 3x10 3#  Standing abduction AROM 3x10 3#  Strap stretch for IR 10\"x10  ER stretch on wall 10\"x10 LUE  Biceps curl 7# 3x10  Wall push ups 2x10      HELD:  Bodyblade   IR/ER 20\"x3 IR/ER   H ABD/ADD 20\"x3  Standing shoulder alphabet x2, 1#    - Manual Therapy:  PROM (L) shoulder in all planes per protocol      ASSESSMENT:  Exercise program altered today to prevent further irritation of the (L) shoulder soreness. Ongoing therapy needed to fulfill maximal ROM and further progress strength of the (L) shoulder.    EDUCATION:  Access Code: [email or web address]    PLAN:   Add in overhead press next visit as well as D2 flexion next visit - this was held today secondary to increased soreness.    "

## 2025-04-21 ENCOUNTER — TREATMENT (OUTPATIENT)
Dept: PHYSICAL THERAPY | Facility: CLINIC | Age: 40
End: 2025-04-21
Payer: COMMERCIAL

## 2025-04-21 DIAGNOSIS — Z98.890 STATUS POST LABRAL REPAIR OF SHOULDER: ICD-10-CM

## 2025-04-21 DIAGNOSIS — M25.512 ACUTE PAIN OF LEFT SHOULDER: ICD-10-CM

## 2025-04-21 DIAGNOSIS — S43.432S TYPE 2 SUPERIOR LABRUM EXTENDING FROM ANTERIOR TO POSTERIOR (SLAP) LESION OF LEFT SHOULDER, SEQUELA: ICD-10-CM

## 2025-04-21 PROCEDURE — 97140 MANUAL THERAPY 1/> REGIONS: CPT | Mod: GP | Performed by: PHYSICAL THERAPIST

## 2025-04-21 PROCEDURE — 97110 THERAPEUTIC EXERCISES: CPT | Mod: GP | Performed by: PHYSICAL THERAPIST

## 2025-04-21 NOTE — PROGRESS NOTES
"Physical Therapy Treatment    Patient Name: Diony Marte  MRN: [id number]  Today's Date: 4/21/2025   Visit 17/40  Time Calculation  Start Time: 1430  Stop Time: 1511  Time Calculation (min): 41 min  1. Status post labral repair of shoulder  Follow Up In Physical Therapy      2. Acute pain of left shoulder  Follow Up In Physical Therapy      3. Type 2 superior labrum extending from anterior to posterior (SLAP) lesion of left shoulder, sequela  Follow Up In Physical Therapy          PRECAUTIONS:  Fall Risk: None  (L) SLAP repair 1/30/2025 (protocol scanned)  Sling for 4 weeks, PROM, gentle AAROM and ISOMetrics; no resisted elbow flexion for 1 month    SUBJECTIVE:  Patient states that his (L) shoulder is more achy today. The joint seems to be painful. No idea why the increase in soreness  Pain level: 1-2/10  Compliant with HEP? Yes    OBJECTIVE:  (L) shoulder abduction PROM: 172 degrees  (L) shoulder ER @90: 85 degrees    TREATMENT:  - Therex:  UBE fwd/rev 3'/3', L8  AAROM wallslides flex/abd x20 each  Sleeper stretch 5\"x15 IR/ER  Mid rows 13.25# on cable column 3x12  Resisted shoulder extensions 8.25# cable column x17  (L) shoulder IR BlkTB 3x12  (L) shoulder ER GrTB 3x12  Standing flexion AROM 3x10 3#  Standing abduction AROM 3x10 3#  Overhead press 5# LUE 3x8  Strap stretch for IR 10\"x10  ER stretch on wall 10\"x10 LUE  Biceps curl 7# 3x10  Wall push ups 2x10  Standing shoulder alphabet x2, 1#    HELD:  Bodyblade   IR/ER 20\"x3 IR/ER   H ABD/ADD 20\"x3      - Manual Therapy:  PROM (L) shoulder in all planes per protocol      ASSESSMENT:  Patient with near full ROM of the (L) shoulder. He still suffers from some mild tightness with ROM which stresses the labral repair in the (L) shoulder. Strength progressing adequately at this time with physical therapy.    EDUCATION:  Access Code: [email or web address]    PLAN:   Lower wall push ups to counter push ups next visit. Continue to increase resistance training with HEP.    "

## 2025-04-23 ENCOUNTER — TREATMENT (OUTPATIENT)
Dept: PHYSICAL THERAPY | Facility: CLINIC | Age: 40
End: 2025-04-23
Payer: COMMERCIAL

## 2025-04-23 DIAGNOSIS — Z98.890 STATUS POST LABRAL REPAIR OF SHOULDER: ICD-10-CM

## 2025-04-23 DIAGNOSIS — S43.432S TYPE 2 SUPERIOR LABRUM EXTENDING FROM ANTERIOR TO POSTERIOR (SLAP) LESION OF LEFT SHOULDER, SEQUELA: ICD-10-CM

## 2025-04-23 DIAGNOSIS — M25.512 ACUTE PAIN OF LEFT SHOULDER: ICD-10-CM

## 2025-04-23 PROCEDURE — 97140 MANUAL THERAPY 1/> REGIONS: CPT | Mod: GP | Performed by: PHYSICAL THERAPIST

## 2025-04-23 PROCEDURE — 97110 THERAPEUTIC EXERCISES: CPT | Mod: GP | Performed by: PHYSICAL THERAPIST

## 2025-04-23 NOTE — PROGRESS NOTES
"Physical Therapy Treatment    Patient Name: Diony Marte  MRN: [id number]  Today's Date: 4/23/2025   Visit 18/40     1. Status post labral repair of shoulder  Follow Up In Physical Therapy      2. Acute pain of left shoulder  Follow Up In Physical Therapy      3. Type 2 superior labrum extending from anterior to posterior (SLAP) lesion of left shoulder, sequela  Follow Up In Physical Therapy          PRECAUTIONS:  Fall Risk: None  (L) SLAP repair 1/30/2025 (protocol scanned)  Sling for 4 weeks, PROM, gentle AAROM and ISOMetrics; no resisted elbow flexion for 1 month    SUBJECTIVE:  Patient reports more achyness in the (L) shoulder today as compared to his normal. Hard to say if there was any a  Pain level: 7/10  Compliant with HEP? Yes    OBJECTIVE:  (L) shoulder abduction PROM: 170 degrees  (L) shoulder ER @90: 85 degrees    TREATMENT:  - Therex:  UBE fwd/rev 3'/3', L8  AAROM wallslides flex/abd x20 each  Cross body stretch 10\"x 10  Prone rows 5# LUE 3x10  Prone \"T's\" LUE 2x10  Prone \"Y's\" LUE 2x10    HELD:  All other strengthening exercises      - Manual Therapy:  PROM (L) shoulder in all planes per protocol      ASSESSMENT:  Most active exercise held today secondary to increase in soreness from patient's report. More frequent icing and avoidance of strength training exercises at home may allow for decrease in pain. When patient's soreness subsides, he may return to full exercise program.    EDUCATION:  Access Code: [email or web address]    PLAN:   Hold on HEP and work primarily continuing ROM exercises.   CP more frequently to the shoulder to control pain.    "

## 2025-04-28 ENCOUNTER — APPOINTMENT (OUTPATIENT)
Dept: PHYSICAL THERAPY | Facility: CLINIC | Age: 40
End: 2025-04-28
Payer: COMMERCIAL

## 2025-04-28 DIAGNOSIS — Z98.890 STATUS POST LABRAL REPAIR OF SHOULDER: ICD-10-CM

## 2025-04-30 ENCOUNTER — OFFICE VISIT (OUTPATIENT)
Dept: ORTHOPEDIC SURGERY | Facility: HOSPITAL | Age: 40
End: 2025-04-30
Payer: COMMERCIAL

## 2025-04-30 ENCOUNTER — APPOINTMENT (OUTPATIENT)
Dept: PHYSICAL THERAPY | Facility: CLINIC | Age: 40
End: 2025-04-30
Payer: COMMERCIAL

## 2025-04-30 DIAGNOSIS — Z98.890 STATUS POST LABRAL REPAIR OF SHOULDER: ICD-10-CM

## 2025-04-30 DIAGNOSIS — S43.432D TYPE 2 SUPERIOR LABRUM EXTENDING FROM ANTERIOR TO POSTERIOR (SLAP) LESION OF LEFT SHOULDER, SUBSEQUENT ENCOUNTER: ICD-10-CM

## 2025-04-30 PROCEDURE — 99212 OFFICE O/P EST SF 10 MIN: CPT | Performed by: ORTHOPAEDIC SURGERY

## 2025-04-30 PROCEDURE — 99024 POSTOP FOLLOW-UP VISIT: CPT | Performed by: ORTHOPAEDIC SURGERY

## 2025-04-30 RX ORDER — MELOXICAM 15 MG/1
15 TABLET ORAL DAILY
Qty: 7 TABLET | Refills: 0 | Status: SHIPPED | OUTPATIENT
Start: 2025-04-30 | End: 2025-05-07

## 2025-04-30 ASSESSMENT — PAIN - FUNCTIONAL ASSESSMENT: PAIN_FUNCTIONAL_ASSESSMENT: 0-10

## 2025-04-30 ASSESSMENT — PAIN SCALES - GENERAL: PAINLEVEL_OUTOF10: 7

## 2025-04-30 NOTE — PROGRESS NOTES
3 months status post repair of SLAP lesion left shoulder.  He still has pain directly over the coracoid deep in the shoulder.    On exam today he has full range of motion his motor power in abduction and external and internal rotation biceps flexion are 5/5 he has a negative Doss's test.  Not tender over the coracoid.    Left shoulder pain  Patient has persistent shoulder pain after SLAP repair.  I recommended a course of anti-inflammatory medication and follow-up by telephone in 1 week.  If not relieved I would recommend reimaging versus injection.    This was dictated using voice recognition software and not corrected for grammatical or spelling errors.

## 2025-05-01 ENCOUNTER — APPOINTMENT (OUTPATIENT)
Dept: PHYSICAL THERAPY | Facility: CLINIC | Age: 40
End: 2025-05-01
Payer: COMMERCIAL

## 2025-05-01 DIAGNOSIS — Z98.890 STATUS POST LABRAL REPAIR OF SHOULDER: ICD-10-CM

## 2025-05-02 ENCOUNTER — PROCEDURE VISIT (OUTPATIENT)
Dept: UROLOGY | Facility: HOSPITAL | Age: 40
End: 2025-05-02
Payer: COMMERCIAL

## 2025-05-02 VITALS — HEART RATE: 77 BPM | SYSTOLIC BLOOD PRESSURE: 112 MMHG | DIASTOLIC BLOOD PRESSURE: 61 MMHG

## 2025-05-02 DIAGNOSIS — R35.0 URINARY FREQUENCY: Primary | ICD-10-CM

## 2025-05-02 PROCEDURE — 64561 IMPLANT NEUROELECTRODES: CPT | Performed by: UROLOGY

## 2025-05-02 PROCEDURE — 64561 IMPLANT NEUROELECTRODES: CPT | Mod: 50 | Performed by: UROLOGY

## 2025-05-02 NOTE — PROGRESS NOTES
40 yo male with refractory OAB    After surgical consent was reviewed with the patient, including risks, benefits and alternatives the patient expressed desire to proceed with the basic evaluation.     The patient was then taken to the procedure room and placed in the prone position. Pillows were placed under the lower abdomen and hips to level and flatten the sacrum and allow the toes to dangle freely.  A ground pad was placed on the bottom of the patient's foot and was connected to the Clinician  along with the test stimulation cable (J-hook). The patient was then prepped and draped in the usual fashion.      Attention was turned to placement of the percutaneous lead wire using the [employer] system. The level of S3 and the medial border of the sacral foramen were using bony landmarks. After administration of local anesthesia, a foramen needle was placed in the superior, medial aspect of the S3 foramen such that the needle was parallel to medial border of the foramen. The needle was advanced such that the tip of the foramen needle was just at the anterior aspect of the sacrum. The J-hook was then placed on the uninsulated portion of the foramen needle and stimulation applied to obtain the opening threshold amplitude. Sensory response was noted to be in the seat area and reported to be comfortable.     The foramen needle was then advanced approximately 1-2 cm and retested at that depth to insure continued motor/sensory response.  the percutaneous lead was placed through the foramen needle. The foramen needle was retracted over the percutaneous lead.     The procedure was then repeated on the contralateral side.     The percutaneous leads were then coiled and a 2x2 cm gauze placed over each followed by a small Tegaderm. The PNE leads were then connected to the ground pad and basic trial cable, placing the ground pad on skin close to the Tegaderm. The basic trial cable was connected to external trial system  (ETS) and impedances were checked.  A cable strain relief was created and covered with a medium to large Tegaderm. This was accomplished bilaterally. A large Tegaderm was placed to cover the connections and any exposed leads.     Patient tolerated the procedure with no complications and was instructed on the use of the patient remote in the post-procedure area.

## 2025-05-05 ENCOUNTER — APPOINTMENT (OUTPATIENT)
Dept: PHYSICAL THERAPY | Facility: CLINIC | Age: 40
End: 2025-05-05
Payer: COMMERCIAL

## 2025-05-05 DIAGNOSIS — Z98.890 STATUS POST LABRAL REPAIR OF SHOULDER: ICD-10-CM

## 2025-05-05 DIAGNOSIS — Z77.21 PERSONAL HISTORY OF EXPOSURE TO POTENTIALLY HAZARDOUS BODY FLUIDS: ICD-10-CM

## 2025-05-06 ENCOUNTER — APPOINTMENT (OUTPATIENT)
Dept: UROLOGY | Facility: HOSPITAL | Age: 40
End: 2025-05-06
Payer: COMMERCIAL

## 2025-05-06 DIAGNOSIS — L65.9 ALOPECIA: ICD-10-CM

## 2025-05-06 DIAGNOSIS — E78.5 DYSLIPIDEMIA: ICD-10-CM

## 2025-05-06 DIAGNOSIS — K21.9 GASTROESOPHAGEAL REFLUX DISEASE, UNSPECIFIED WHETHER ESOPHAGITIS PRESENT: ICD-10-CM

## 2025-05-06 RX ORDER — FINASTERIDE 5 MG/1
TABLET, FILM COATED ORAL
Qty: 23 TABLET | Refills: 0 | Status: CANCELLED | OUTPATIENT
Start: 2025-05-06

## 2025-05-06 RX ORDER — ESOMEPRAZOLE MAGNESIUM 40 MG/1
40 CAPSULE, DELAYED RELEASE ORAL
Qty: 90 CAPSULE | Refills: 1 | Status: CANCELLED | OUTPATIENT
Start: 2025-05-06

## 2025-05-06 RX ORDER — ATORVASTATIN CALCIUM 40 MG/1
40 TABLET, FILM COATED ORAL NIGHTLY
Qty: 90 TABLET | Refills: 1 | Status: CANCELLED | OUTPATIENT
Start: 2025-05-06 | End: 2025-11-02

## 2025-05-07 RX ORDER — DOXYCYCLINE HYCLATE 100 MG
TABLET ORAL
Qty: 30 TABLET | Refills: 23 | OUTPATIENT
Start: 2025-05-07

## 2025-05-09 ENCOUNTER — TELEMEDICINE (OUTPATIENT)
Dept: PRIMARY CARE | Facility: CLINIC | Age: 40
End: 2025-05-09
Payer: COMMERCIAL

## 2025-05-09 ENCOUNTER — OFFICE VISIT (OUTPATIENT)
Dept: UROLOGY | Facility: HOSPITAL | Age: 40
End: 2025-05-09
Payer: COMMERCIAL

## 2025-05-09 DIAGNOSIS — R35.0 URINARY FREQUENCY: Primary | ICD-10-CM

## 2025-05-09 DIAGNOSIS — F41.9 ANXIETY: ICD-10-CM

## 2025-05-09 DIAGNOSIS — L65.9 ALOPECIA: ICD-10-CM

## 2025-05-09 DIAGNOSIS — Z13.89 SCREENING FOR BLOOD OR PROTEIN IN URINE: ICD-10-CM

## 2025-05-09 DIAGNOSIS — K21.9 GASTROESOPHAGEAL REFLUX DISEASE, UNSPECIFIED WHETHER ESOPHAGITIS PRESENT: ICD-10-CM

## 2025-05-09 DIAGNOSIS — R51.9 CHRONIC NONINTRACTABLE HEADACHE, UNSPECIFIED HEADACHE TYPE: ICD-10-CM

## 2025-05-09 DIAGNOSIS — G89.29 CHRONIC NONINTRACTABLE HEADACHE, UNSPECIFIED HEADACHE TYPE: ICD-10-CM

## 2025-05-09 DIAGNOSIS — Z20.9 SEXUAL BEHAVIOR WITH HIGH RISK OF EXPOSURE TO COMMUNICABLE DISEASE: ICD-10-CM

## 2025-05-09 DIAGNOSIS — E78.00 ELEVATED LDL CHOLESTEROL LEVEL: Primary | ICD-10-CM

## 2025-05-09 DIAGNOSIS — F31.70 BIPOLAR DISORDER IN PARTIAL REMISSION, MOST RECENT EPISODE UNSPECIFIED TYPE (MULTI): ICD-10-CM

## 2025-05-09 DIAGNOSIS — Z72.51 SEXUAL BEHAVIOR WITH HIGH RISK OF EXPOSURE TO COMMUNICABLE DISEASE: ICD-10-CM

## 2025-05-09 DIAGNOSIS — K27.9 PEPTIC ULCER: ICD-10-CM

## 2025-05-09 PROBLEM — M79.18 MUSCULOSKELETAL PAIN: Status: RESOLVED | Noted: 2023-08-03 | Resolved: 2025-05-09

## 2025-05-09 PROBLEM — S43.432A TYPE 2 SUPERIOR LABRUM EXTENDING FROM ANTERIOR TO POSTERIOR (SLAP) LESION OF LEFT SHOULDER: Status: RESOLVED | Noted: 2024-12-17 | Resolved: 2025-05-09

## 2025-05-09 PROBLEM — M77.10 TENNIS ELBOW: Status: RESOLVED | Noted: 2023-08-03 | Resolved: 2025-05-09

## 2025-05-09 PROBLEM — Z98.890 STATUS POST LABRAL REPAIR OF SHOULDER: Status: RESOLVED | Noted: 2025-02-07 | Resolved: 2025-05-09

## 2025-05-09 PROBLEM — U07.1 COVID-19 VIRUS INFECTION: Status: RESOLVED | Noted: 2023-08-03 | Resolved: 2025-05-09

## 2025-05-09 PROBLEM — M54.50 LOWER BACK PAIN: Status: RESOLVED | Noted: 2023-08-03 | Resolved: 2025-05-09

## 2025-05-09 PROBLEM — M25.512 ACUTE PAIN OF LEFT SHOULDER: Status: RESOLVED | Noted: 2024-10-10 | Resolved: 2025-05-09

## 2025-05-09 PROBLEM — M77.12 LATERAL EPICONDYLITIS OF LEFT ELBOW: Status: RESOLVED | Noted: 2023-08-03 | Resolved: 2025-05-09

## 2025-05-09 PROCEDURE — 99211 OFF/OP EST MAY X REQ PHY/QHP: CPT | Performed by: UROLOGY

## 2025-05-09 PROCEDURE — 99213 OFFICE O/P EST LOW 20 MIN: CPT | Performed by: FAMILY MEDICINE

## 2025-05-09 PROCEDURE — 1036F TOBACCO NON-USER: CPT | Performed by: FAMILY MEDICINE

## 2025-05-09 RX ORDER — FINASTERIDE 5 MG/1
TABLET, FILM COATED ORAL
Qty: 23 TABLET | Refills: 0 | Status: SHIPPED | OUTPATIENT
Start: 2025-05-09

## 2025-05-09 RX ORDER — ATORVASTATIN CALCIUM 40 MG/1
40 TABLET, FILM COATED ORAL NIGHTLY
Qty: 90 TABLET | Refills: 1 | Status: SHIPPED | OUTPATIENT
Start: 2025-05-09

## 2025-05-09 RX ORDER — DULOXETIN HYDROCHLORIDE 20 MG/1
40 CAPSULE, DELAYED RELEASE ORAL DAILY
Qty: 180 CAPSULE | Refills: 1 | Status: SHIPPED | OUTPATIENT
Start: 2025-05-09 | End: 2026-05-09

## 2025-05-09 RX ORDER — ESOMEPRAZOLE MAGNESIUM 40 MG/1
40 CAPSULE, DELAYED RELEASE ORAL
Qty: 90 CAPSULE | Refills: 1 | Status: SHIPPED | OUTPATIENT
Start: 2025-05-09

## 2025-05-09 RX ORDER — CHLORHEXIDINE GLUCONATE 40 MG/ML
SOLUTION TOPICAL DAILY PRN
OUTPATIENT
Start: 2025-05-09

## 2025-05-09 RX ORDER — VANCOMYCIN HYDROCHLORIDE 1 G/200ML
1000 INJECTION, SOLUTION INTRAVENOUS ONCE
OUTPATIENT
Start: 2025-05-09 | End: 2025-05-09

## 2025-05-09 ASSESSMENT — ENCOUNTER SYMPTOMS
SORE THROAT: 0
DIARRHEA: 0
CHEST TIGHTNESS: 0
SHORTNESS OF BREATH: 0
ABDOMINAL DISTENTION: 0
DYSPHORIC MOOD: 1
BACK PAIN: 0
ARTHRALGIAS: 0
NERVOUS/ANXIOUS: 1
FEVER: 0
CHILLS: 0
HEADACHES: 1
ADENOPATHY: 0
WEAKNESS: 0
DIZZINESS: 0
APPETITE CHANGE: 0
COUGH: 0
DIFFICULTY URINATING: 0
DYSURIA: 0
EYE PAIN: 0
BRUISES/BLEEDS EASILY: 0
FATIGUE: 0
CONSTIPATION: 0
BLOOD IN STOOL: 0
EYE REDNESS: 0
ABDOMINAL PAIN: 0

## 2025-05-09 NOTE — PROGRESS NOTES
NAME:Diony Marte  DATE: 5/9/2025               Subjective:   Chief complaint: OAB    HPI:  39 y.o. male presenting for fuv      HPI:   Voiding Symptoms  Frequency: Q every 45min   Urgency: Yes  Urge Incontinence: Yes  Nocturia: 6 times per night   Stream: strong  Hesitancy: No  Straining: No  Intermittency: No  Sensation of Incomplete Emptying: No  Stress Incontinence: No  Pads:  No   Dysuria:  No  Gross Hematuria:  No  UTI:  No  Stones:  No    Consumes 2 cups of coffee, 90 oz of water per day.     Yesmedications for his bladder in the past.  Has used:  Has failed oxybutynin, myrbetriq, gemtesa    Bowel Function:   Every 2-3 days    Pt had axonics PNE on 5/2/25 5/9/25 - Reports >50% improvement in symptoms.  Voiding less during day (12-13 vs 25x).  Only getting up once a night vs 4-6x.      Past Medical History:   Diagnosis Date    Body mass index (BMI) 27.0-27.9, adult 12/13/2018    BMI 27.0-27.9,adult    Contact with and (suspected) exposure to other viral communicable diseases 02/21/2019    Exposure to viral disease    Disorder of bone, unspecified 04/12/2021    Bone lesion    Dysphagia, pharyngoesophageal phase 02/06/2017    Pharyngoesophageal dysphagia    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    Recurrent cold sores    Low back pain, unspecified 12/17/2018    Lumbar pain    Otalgia, bilateral 04/10/2021    Otalgia of both ears    Other specified disorders of nose and nasal sinuses 10/12/2020    Tenderness over frontal sinus    Otitis media, unspecified, bilateral 03/11/2021    BOM (bilateral otitis media)    Otitis media, unspecified, right ear 03/07/2017    Acute otitis media, right    Pain in right knee 03/11/2016    Acute pain of right knee    Personal history of other diseases of the musculoskeletal system and connective tissue 11/30/2020    History of muscle pain    Personal history of other diseases of the musculoskeletal system and connective tissue 11/06/2014    History of neck pain     Personal history of other diseases of the musculoskeletal system and connective tissue     History of radiculopathy    Personal history of other diseases of the respiratory system 04/10/2021    History of sore throat    Personal history of other diseases of the respiratory system 03/26/2018    History of acute sinusitis    Personal history of other diseases of the respiratory system 03/22/2018    History of sore throat    Personal history of other diseases of the respiratory system 04/03/2020    History of paranasal sinus congestion    Personal history of other diseases of the respiratory system     Personal history of asthma    Personal history of other endocrine, nutritional and metabolic disease 11/26/2019    History of hyperlipidemia    Personal history of other infectious and parasitic diseases 03/26/2018    History of viral infection    Personal history of other mental and behavioral disorders 11/20/2019    History of depression    Personal history of other specified conditions 11/30/2020    History of diarrhea    Personal history of other specified conditions 04/09/2021    History of dizziness    Personal history of peptic ulcer disease 02/27/2019    History of gastric ulcer    Personal history of peptic ulcer disease     Personal history of gastric ulcer    Unspecified otitis externa, right ear 03/22/2018    Right otitis externa     Past Surgical History:   Procedure Laterality Date    TONSILLECTOMY  11/06/2014    Tonsillectomy     Social History     Tobacco Use    Smoking status: Never    Smokeless tobacco: Never   Substance Use Topics    Alcohol use: Yes     Alcohol/week: 14.0 standard drinks of alcohol     Types: 7 Shots of liquor, 7 Standard drinks or equivalent per week     Family History   Problem Relation Name Age of Onset    Hyperlipidemia Other      Breast cancer Other      Diabetes Other      Neuropathy Other      Depression Other      Alcohol abuse Other      Colon cancer Other      Autism  spectrum disorder Other       Current Outpatient Medications on File Prior to Visit   Medication Sig Dispense Refill    ALPRAZolam (Xanax) 0.5 mg tablet Take 1 tablet (0.5 mg) by mouth once daily at bedtime.      atorvastatin (Lipitor) 40 mg tablet Take 1 tablet (40 mg) by mouth once daily at bedtime. 90 tablet 1    buPROPion XL (Wellbutrin XL) 150 mg 24 hr tablet Take 2 tablets (300 mg) by mouth early in the morning..      Descovy 200-25 mg tablet Take 1 tablet by mouth once daily. 30 tablet 2    doxycycline (Vibra-Tabs) 100 mg tablet TAKE 2 TABLETS WITHIN 24 TO 72 HOURS AFTER UNPROTECTED SEXUAL ENCOUNTER, NOT TO BE TAKEN DAILY. Take with a large glass of water and do not lie down for 30 minutes after. 30 tablet 0    DULoxetine (Cymbalta) 20 mg DR capsule Take 2 capsules (40 mg) by mouth once daily. Do not crush or chew.      esomeprazole (NexIUM) 40 mg DR capsule Take 1 capsule (40 mg) by mouth once daily in the morning. Take before meals. 90 capsule 1    finasteride (Proscar) 5 mg tablet TAKE ONE-FOURTH (1/4) TABLET DAILY 23 tablet 0    Gemtesa 75 mg tablet TAKE 1 TABLET IN THE MOUTH OR THROAT ONCE DAILY 90 tablet 3    lamoTRIgine (LaMICtal) 200 mg tablet Take 2 tablets (400 mg) by mouth once daily.      lurasidone (Latuda) 60 mg tablet 2 tablets (120 mg) once daily.      [] meloxicam (Mobic) 15 mg tablet Take 1 tablet (15 mg) by mouth once daily for 7 days. 7 tablet 0    methocarbamol (Robaxin) 750 mg tablet Take 1 tablet (750 mg) by mouth 3 times a day for 10 days. Alternatively may take 1-2 tablets at night before bed if drowsiness occurs 30 tablet 0    tadalafil (Cialis) 10 mg tablet Take 1 tablet (10 mg) by mouth if needed for erectile dysfunction. 15 tablet 3    valACYclovir (Valtrex) 1 gram tablet TAKE 2 TABLETS EVERY 12 HOURS FOR 1 DAY AS NEEDED AT EARLY ONSET OF COLD SORES 40 tablet 0     Current Facility-Administered Medications on File Prior to Visit   Medication Dose Route Frequency Provider  Last Rate Last Admin    ciprofloxacin (Cipro) tablet 500 mg  500 mg oral Once Celio Pierce MD MPH         Diony is allergic to midazolam, amoxicillin, and aripiprazole.     Review of Systems    14 point ROS reviewed and discussed with the patient. Pertinent positives/negatives discussed in the History of Present Illness (HPI).      FH:  Prostate CA: Yes, grandfather  Bladder CA: No  Kidney CA: No  Stones: Yes    Social History  Occupation: IT consulting  Tob: No  Etoh: Yes      Objective:     There is no height or weight on file to calculate BMI.   There were no vitals taken for this visit.     Physical Exam   1. Constitutional: NAD, Well-developed, Well-nourished  2. Respiratory: Unlabored breathing, no audible wheezes, no use of accessory muscles   3. Cardiovascular: No JVD, extremities perfused, no edema  4. Abdomen: Soft, non-tender, non-distended, no masses or hernia.  No CVA tenderness.    5. Skin: no visible lesions, plaque, rashes, jaundice  6. Neuro: Gait normal, no focal neurologic deficit  7. Psychiatric: Mood and affect normal and appropriate, alert and oriented  *. Back - bilateral leads removed    Labs  Lab Results   Component Value Date    GFRMALE 88 08/08/2023     Lab Results   Component Value Date    CREATININE 1.13 03/13/2025     Lab Results   Component Value Date    CHOL 153 09/20/2024     Lab Results   Component Value Date    HDL 49.7 09/20/2024     Lab Results   Component Value Date    CHHDL 3.1 09/20/2024     Lab Results   Component Value Date    LDLF 73 08/08/2023     Lab Results   Component Value Date    VLDL 14 09/20/2024     Lab Results   Component Value Date    TRIG 70 09/20/2024     Lab Results   Component Value Date    HGBA1C 5.2 02/24/2020     Lab Results   Component Value Date    HCT 38.4 (L) 12/31/2024       Assessment/Plan:   Diony Marte presents with     1. Urinary frequency    Good results with PNE trial  Plan for full implant in OR    OAB    We discussed the  pathophysiology of overactive bladder. We discussed possible treatment options including doing conservative voiding techniques, medications, and surgical options.. He was counseled regarding bladder retraining, diet choices, and fluid restriction.  A handout about this given as well as a voiding diary    Patient was informed that first line treatment is behavioral therapy.  This includes:   -Fluid balancing and sometimes restriction   -Reducing caffeine or other bladder stimulants   -Bladder retraining  -Delayed voiding to help defer the overwhelming urge    Patient was informed that second line treatment includes medications. We discussed Mirabegron and that the side effects include possible increase in blood pressure in a small minority of patients, however insurance does not always cover this. We also discussed anticholinergic medications which can have the side effects of dry eyes, dry mouth, constipation and rarely cognitive changes.    I mentioned 3rd line management options of neuromodulation and Botox injections as well    Patient continues to have bothersome urinary symptoms despite conservative measures and had tried at least 2 medications (anti-cholinergic or beta-agonist).  We discussed OAB pathway as above.  We went into detail about [employer] neuromodulation.  We discussed that this is a long-term treatment that helps to restore communication between the brain, bladder and bowel.    We discussed the peripheral nerve evaluation (PNE).  That it would be done in the office setting with local anesthetic.  Two tiny wires, or leads, would be inserted in the upper buttock.  This would be guided by bony landmarks and patients sensory responses.  Once we confirmed the correct location the leads would be connected to an external device and secured to the patients back.  They would return home with no major restrictions, just no showering or strenuous activity.  They will keep a voiding diary to monitor their  responses. They will follow up in office the week after to have the leads removed and review the voiding diary.  A successful trial is deemed if >50% response.    If we decide to move forward with permanent implant that will be done in the operating room with light sedation.  A permanent lead would be placed under fluoroscopic guidance to confirm correct positioning.  This is then connected to an internal pulse generator which can last up to 20 years.    Risks of change in sensation, pain, irritation, bleeding, movement of the lead and implant infection discussed.    Patient in agreement and wants to proceed.

## 2025-05-09 NOTE — PROGRESS NOTES
"Subjective   Patient ID: Diony Marte \"Iglesia" is a 39 y.o. male who presents for Follow-up.  Virtual or Telephone Consent    An interactive audio and video telecommunication system which permits real time communications between the patient (at the originating site) and provider (at the distant site) was utilized to provide this telehealth service.   Verbal consent was requested and obtained from Diony Marte on this date, 05/09/25 for a telehealth visit and the patient's location was confirmed at the time of the visit.     Pt has Dyslipidemia.   Lipid panel due to recheck, he's getting labs next week.  Currently taking Rosuvastatin and is tolerating well without muscle pains or weakness.     Pt has chronic and  stable Bipolar/ depression / anxiety.  Pt  has supportive family/friends.   Pt is seeing a psychiatrist.   Taking medications consistently and they are helping.   Mood, energy, motivation, interests, sleep and appetite are adequate. Anxiety is stable. Daily HA are mild and stable.    GERD/hx PUD is well controlled on Nexium . Pt is taking medication daily. Denies epigastric pain, nausea, heartburn or water brash.         Review of Systems   Constitutional:  Negative for appetite change, chills, fatigue and fever.   HENT:  Negative for congestion, hearing loss and sore throat.    Eyes:  Negative for pain, redness and visual disturbance.   Respiratory:  Negative for cough, chest tightness and shortness of breath.    Cardiovascular:  Negative for chest pain and leg swelling.   Gastrointestinal:  Negative for abdominal distention, abdominal pain, blood in stool, constipation and diarrhea.   Genitourinary:  Negative for difficulty urinating and dysuria.   Musculoskeletal:  Negative for arthralgias and back pain.   Skin:  Negative for rash.   Neurological:  Positive for headaches. Negative for dizziness and weakness.   Hematological:  Negative for adenopathy. Does not bruise/bleed easily. "   Psychiatric/Behavioral:  Positive for dysphoric mood. The patient is nervous/anxious.        Objective   There were no vitals taken for this visit.   Physical Exam  Constitutional:       Appearance: Normal appearance. He is not ill-appearing.   Pulmonary:      Effort: No respiratory distress.   Neurological:      Mental Status: He is alert.   Psychiatric:         Mood and Affect: Mood normal.         Thought Content: Thought content normal.         Judgment: Judgment normal.           Assessment/Plan   Diagnoses and all orders for this visit:  Elevated LDL cholesterol level - doing well on statin, continue. To recheck fasting labs.  Bipolar disorder/Chronic nonintractable headache- stable, continue current meds per psychiatry and monitor  Anxiety - better with Cymbalta, continue current dose  Peptic ulcer/Gastroesophageal reflux disease - well controlled on Nexium  Follow up in October for physical

## 2025-05-09 NOTE — PROGRESS NOTES
Subjective   Patient ID: Leanrdo Marte is a 39 y.o. male who presents for Follow-up.    HPI     Review of Systems    Objective   There were no vitals taken for this visit.    Physical Exam    Assessment/Plan       Virtual or Telephone Consent    An interactive audio and video telecommunication system which permits real time communications between the patient (at the originating site) and provider (at the distant site) was utilized to provide this telehealth service.   Verbal consent was requested and obtained from Diony Marte on this date, 05/09/25 for a telehealth visit and the patient's location was confirmed at the time of the visit.

## 2025-05-10 DIAGNOSIS — E78.00 ELEVATED LDL CHOLESTEROL LEVEL: ICD-10-CM

## 2025-05-12 ENCOUNTER — APPOINTMENT (OUTPATIENT)
Dept: PHYSICAL THERAPY | Facility: CLINIC | Age: 40
End: 2025-05-12
Payer: COMMERCIAL

## 2025-05-12 DIAGNOSIS — Z98.890 STATUS POST LABRAL REPAIR OF SHOULDER: ICD-10-CM

## 2025-05-13 LAB
ALBUMIN SERPL-MCNC: 4.6 G/DL (ref 3.6–5.1)
ALP SERPL-CCNC: 38 U/L (ref 36–130)
ALT SERPL-CCNC: 38 U/L (ref 9–46)
ANION GAP SERPL CALCULATED.4IONS-SCNC: 8 MMOL/L (CALC) (ref 7–17)
AST SERPL-CCNC: 20 U/L (ref 10–40)
BILIRUB SERPL-MCNC: 0.7 MG/DL (ref 0.2–1.2)
BUN SERPL-MCNC: 11 MG/DL (ref 7–25)
CALCIUM SERPL-MCNC: 9.9 MG/DL (ref 8.6–10.3)
CHLORIDE SERPL-SCNC: 103 MMOL/L (ref 98–110)
CHOLEST SERPL-MCNC: 136 MG/DL
CHOLEST/HDLC SERPL: 3.1 (CALC)
CO2 SERPL-SCNC: 29 MMOL/L (ref 20–32)
CREAT SERPL-MCNC: 1.29 MG/DL (ref 0.6–1.26)
EGFRCR SERPLBLD CKD-EPI 2021: 73 ML/MIN/1.73M2
GLUCOSE SERPL-MCNC: 102 MG/DL (ref 65–99)
HDLC SERPL-MCNC: 44 MG/DL
LDLC SERPL CALC-MCNC: 79 MG/DL (CALC)
NONHDLC SERPL-MCNC: 92 MG/DL (CALC)
POTASSIUM SERPL-SCNC: 4.6 MMOL/L (ref 3.5–5.3)
PROT SERPL-MCNC: 7.2 G/DL (ref 6.1–8.1)
SODIUM SERPL-SCNC: 140 MMOL/L (ref 135–146)
TRIGL SERPL-MCNC: 52 MG/DL

## 2025-05-15 ENCOUNTER — TREATMENT (OUTPATIENT)
Dept: PHYSICAL THERAPY | Facility: CLINIC | Age: 40
End: 2025-05-15
Payer: COMMERCIAL

## 2025-05-15 DIAGNOSIS — Z98.890 STATUS POST LABRAL REPAIR OF SHOULDER: ICD-10-CM

## 2025-05-15 DIAGNOSIS — S43.432S TYPE 2 SUPERIOR LABRUM EXTENDING FROM ANTERIOR TO POSTERIOR (SLAP) LESION OF LEFT SHOULDER, SEQUELA: ICD-10-CM

## 2025-05-15 DIAGNOSIS — M25.512 ACUTE PAIN OF LEFT SHOULDER: ICD-10-CM

## 2025-05-15 PROCEDURE — 97535 SELF CARE MNGMENT TRAINING: CPT | Mod: GP | Performed by: PHYSICAL THERAPIST

## 2025-05-15 NOTE — PROGRESS NOTES
"Subjective   Patient ID: Diony Marte \"Leandro\" is a 39 y.o. male    HPI  39 y.o. male who presents for a follow-up visit with ongoing overactive bladder symptoms unresponsive to current medication. S/P Stage 1 & 2 Insertion Continence Control Stimulator Sacral Lead with Neurostimulator 07/09/2024 performed by Dr. Lockwood.     Today, he ***        Review of Systems    All systems were reviewed. Anything negative was noted in the HPI.    Objective   Physical Exam    General: Well developed, well nourished, alert and cooperative, appears in no acute distress   Eyes: Non-injected conjunctiva, sclera clear, no proptosis   Cardiac: Extremities are warm and well perfused. No edema, cyanosis or pallor   Lungs: Breathing is easy, non-labored. Speaking in clear and complete sentences. Normal diaphragmatic movement   MSK: Ambulatory with steady gait, unassisted   Neuro: Alert and oriented to person, place, and time   Psych: Demonstrates good judgment and reason, without hallucinations, abnormal affect or abnormal behaviors   Skin: No obvious lesions, no rashes       No CVA tenderness bilaterally   No suprapubic pain or discomfort       Past Medical History:   Diagnosis Date    Body mass index (BMI) 27.0-27.9, adult 12/13/2018    BMI 27.0-27.9,adult    Contact with and (suspected) exposure to other viral communicable diseases 02/21/2019    Exposure to viral disease    Disorder of bone, unspecified 04/12/2021    Bone lesion    Dysphagia, pharyngoesophageal phase 02/06/2017    Pharyngoesophageal dysphagia    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    Recurrent cold sores    Low back pain, unspecified 12/17/2018    Lumbar pain    Otalgia, bilateral 04/10/2021    Otalgia of both ears    Other specified disorders of nose and nasal sinuses 10/12/2020    Tenderness over frontal sinus    Otitis media, unspecified, bilateral 03/11/2021    BOM (bilateral otitis media)    Otitis media, unspecified, right ear 03/07/2017    Acute " otitis media, right    Pain in right knee 03/11/2016    Acute pain of right knee    Personal history of other diseases of the musculoskeletal system and connective tissue 11/30/2020    History of muscle pain    Personal history of other diseases of the musculoskeletal system and connective tissue 11/06/2014    History of neck pain    Personal history of other diseases of the musculoskeletal system and connective tissue     History of radiculopathy    Personal history of other diseases of the respiratory system 04/10/2021    History of sore throat    Personal history of other diseases of the respiratory system 03/26/2018    History of acute sinusitis    Personal history of other diseases of the respiratory system 03/22/2018    History of sore throat    Personal history of other diseases of the respiratory system 04/03/2020    History of paranasal sinus congestion    Personal history of other diseases of the respiratory system     Personal history of asthma    Personal history of other endocrine, nutritional and metabolic disease 11/26/2019    History of hyperlipidemia    Personal history of other infectious and parasitic diseases 03/26/2018    History of viral infection    Personal history of other mental and behavioral disorders 11/20/2019    History of depression    Personal history of other specified conditions 11/30/2020    History of diarrhea    Personal history of other specified conditions 04/09/2021    History of dizziness    Personal history of peptic ulcer disease 02/27/2019    History of gastric ulcer    Personal history of peptic ulcer disease     Personal history of gastric ulcer    Type 2 superior labrum extending from anterior to posterior (SLAP) lesion of left shoulder 12/17/2024    Unspecified otitis externa, right ear 03/22/2018    Right otitis externa         Past Surgical History:   Procedure Laterality Date    TONSILLECTOMY  11/06/2014    Tonsillectomy         Assessment/Plan   Overactive bladder  refractory to oral meds, mild Erectile dysfunction    39 y.o. male who presents for the above condition, We had a very long and extensive discussion with the patient regarding the pathophysiology, differential diagnosis, risk factor, management, natural history, incidence and diagnostic work-up of the condition.      - Overactive bladder unresponsive to medication  - Potential candidate for neuromodulation implant    - Stop 5 mg Cialis  - Start 10 mg Cialis as needed  - Consider neuromodulation implant  - Continue current medication Gemtesa 75mg    Plan:  - ***            E&M visit today is associated with current or anticipated ongoing medical care services related to a patient's single, serious condition or a complex condition.     5/20/2025    Scribe Attestation  By signing my name below, INeida Scribe attest that this documentation has been prepared under the direction and in the presence of Dr. Celio Pierce.

## 2025-05-15 NOTE — PROGRESS NOTES
Physical Therapy Treatment    Patient Name: Diony Marte  MRN: [id number]  Today's Date: 5/15/2025   Visit 19/40  Time Calculation  Start Time: 1301  Stop Time: 1318  Time Calculation (min): 17 min  1. Acute pain of left shoulder  Follow Up In Physical Therapy      2. Status post labral repair of shoulder  Follow Up In Physical Therapy      3. Type 2 superior labrum extending from anterior to posterior (SLAP) lesion of left shoulder, sequela  Follow Up In Physical Therapy          PRECAUTIONS:  Fall Risk: None  (L) SLAP repair 1/30/2025 (protocol scanned)  Sling for 4 weeks, PROM, gentle AAROM and ISOMetrics; no resisted elbow flexion for 1 month    SUBJECTIVE:  Patient states that he had his follow-up with his surgeon and discussed his ongoing (L) shoulder pain. The surgeon tried a week of oral anti-inflammatory medication but this did not resolve the pain. Plan is to have an MRI arthrogram. Patient reports that his pain is most present with lifting or lying on the (L) shoulder. The pain can be sharp in nature.  Pain level: 0-7/10  Compliant with HEP? Yes    OBJECTIVE:  (L) shoulder abduction PROM: 175 degrees with pain   Negative Yergasons, Negative biceps load, + Ayala's    TREATMENT:  - Self-care:  Reassessment and instruction on proximal biceps stretch and isometrics with frequent ice use.      ASSESSMENT:  (L) shoulder pain may be due to inflammation in the proximal biceps tendon and thus course of treatment altered to address possible proximal biceps inflammation. Plan is to hold on all other strengthening while addressing proximal biceps.    EDUCATION:  Access Code: [email or web address]  URL: [email or web address]  Date: 05/15/2025  Prepared by: Jorge Green    Exercises  - Standing Bicep Stretch at Wall  - 2 x daily - 7 x weekly - 10 reps - 10 sec hold  - Standing Isometric Shoulder Flexion with Doorway (Mirrored)  - 2 x daily - 7 x weekly - 10 reps - 10 sec hold    PLAN:   Perform new  HEP targeted to the proximal biceps and ice diligently.   Follow-up in 1 week to progress exercise vs hold on treatment until arthrogram is performed.

## 2025-05-19 RX ORDER — ATORVASTATIN CALCIUM 40 MG/1
40 TABLET, FILM COATED ORAL NIGHTLY
Qty: 90 TABLET | Refills: 3 | OUTPATIENT
Start: 2025-05-19

## 2025-05-20 ENCOUNTER — APPOINTMENT (OUTPATIENT)
Dept: UROLOGY | Facility: HOSPITAL | Age: 40
End: 2025-05-20
Payer: COMMERCIAL

## 2025-05-21 ENCOUNTER — TREATMENT (OUTPATIENT)
Dept: PHYSICAL THERAPY | Facility: CLINIC | Age: 40
End: 2025-05-21
Payer: COMMERCIAL

## 2025-05-21 DIAGNOSIS — Z98.890 STATUS POST LABRAL REPAIR OF SHOULDER: ICD-10-CM

## 2025-05-21 PROCEDURE — 97140 MANUAL THERAPY 1/> REGIONS: CPT | Mod: GP | Performed by: PHYSICAL THERAPIST

## 2025-05-21 PROCEDURE — 97110 THERAPEUTIC EXERCISES: CPT | Mod: GP | Performed by: PHYSICAL THERAPIST

## 2025-05-21 NOTE — PROGRESS NOTES
"Physical Therapy Treatment    Patient Name: Diony Marte  MRN: [id number]  Today's Date: 5/21/2025   Visit 20/40  Time Calculation  Start Time: 1506  Stop Time: 1533  Time Calculation (min): 27 min  1. Status post labral repair of shoulder            PRECAUTIONS:  Fall Risk: None  (L) SLAP repair 1/30/2025 (protocol scanned)  Sling for 4 weeks, PROM, gentle AAROM and ISOMetrics; no resisted elbow flexion for 1 month    SUBJECTIVE:  Patient reports that his (L) shoulder pain has somewhat improved over the past week but it has felt rather tight. He still struggles with trying to sleep on his left side. MRI arthrogram scheduled for 6/11/25.  Pain level: 0-7/10  Compliant with HEP? Yes    OBJECTIVE:  (L) shoulder abduction PROM: 170 degrees with notable pain   Negative Yergasons, Negative biceps load, Negative Ayala's, No pain to palpation of the proximal biceps tendon LUE    TREATMENT:  THERE-EX:  UBE fwd/rev 3'/3', L8  AAROM wallslides flex/abd x20 each  (L) shoulder proximal biceps stretc 10\"x10  (L) shoulder eccentric shoulder flexion 5# 2x10    MANUAL THERAPY:  PROM (L) shoulder in all planes per protocol    ASSESSMENT:  (L) shoulder pain may be due to inflammation in the proximal biceps tendon and thus course of treatment altered to address possible proximal biceps inflammation. Plan is to hold on all other strengthening while addressing proximal biceps.    EDUCATION:  Progression from ISOMetric shoulder flexion to eccentric shoulder flexion.    PLAN:   Transition to eccentrics of the (L) shoulder,  Follow-up in 1 week to progress exercise vs hold on treatment until arthrogram is performed.    "

## 2025-05-28 ENCOUNTER — TREATMENT (OUTPATIENT)
Dept: PHYSICAL THERAPY | Facility: CLINIC | Age: 40
End: 2025-05-28
Payer: COMMERCIAL

## 2025-05-28 DIAGNOSIS — Z98.890 STATUS POST LABRAL REPAIR OF SHOULDER: ICD-10-CM

## 2025-05-28 PROCEDURE — 97110 THERAPEUTIC EXERCISES: CPT | Mod: GP | Performed by: PHYSICAL THERAPIST

## 2025-05-28 PROCEDURE — 97140 MANUAL THERAPY 1/> REGIONS: CPT | Mod: GP | Performed by: PHYSICAL THERAPIST

## 2025-05-28 NOTE — PROGRESS NOTES
"Physical Therapy Treatment    Patient Name: Diony Marte  MRN: [id number]  Today's Date: 5/28/2025   Visit 21/40  Time Calculation  Start Time: 1500  Stop Time: 1540  Time Calculation (min): 40 min  1. Status post labral repair of shoulder            PRECAUTIONS:  Fall Risk: None  (L) SLAP repair 1/30/2025 (protocol scanned)  Sling for 4 weeks, PROM, gentle AAROM and ISOMetrics; no resisted elbow flexion for 1 month    SUBJECTIVE:  Patient reports that his (L) shoulder pain has improved quite a bit compared to where it was previously. He is no longer getting the sharp shooting pains. He is now able to sleep on his (L) side without pain as well.  Pain level: 0-7/10  Compliant with HEP? Yes    OBJECTIVE:  (L) shoulder abduction PROM: 170 degrees with moderate pain  (L) shoulder ER PROM: 85 degrees with mild pain  Full PROM (L) shoulder in all other planes    TREATMENT:  THERE-EX:  UBE fwd/rev 3'/3', L8  AAROM wallslides flex/abd x20 each  (L) shoulder proximal biceps stretc 10\"x10  (L) shoulder crossbody stretch 10\"x10  (L) shoulder IR/ER BlkTB 3x10 each  Eccentric (L) shoulder flexion with GRTB 2x10  Prone I's, Y's, T's 2x10 each LUE    MANUAL THERAPY:  PROM (L) shoulder in all planes per protocol    ASSESSMENT:  Patient tolerated treatment well and without adverse reaction. Patient showing signs of decreased inflammation in the (L) shoulder, which is allowing for improved function. Ongoing strengthening will allow for full return to prior level of function.    EDUCATION:    PLAN:   Continue HEP and stretching at home.  Slowly resume strength training with using pain as guiding measure.    "

## 2025-06-02 ENCOUNTER — TREATMENT (OUTPATIENT)
Dept: PHYSICAL THERAPY | Facility: CLINIC | Age: 40
End: 2025-06-02
Payer: COMMERCIAL

## 2025-06-02 DIAGNOSIS — Z98.890 STATUS POST LABRAL REPAIR OF SHOULDER: ICD-10-CM

## 2025-06-02 PROCEDURE — 97140 MANUAL THERAPY 1/> REGIONS: CPT | Mod: GP | Performed by: PHYSICAL THERAPIST

## 2025-06-02 PROCEDURE — 97110 THERAPEUTIC EXERCISES: CPT | Mod: GP | Performed by: PHYSICAL THERAPIST

## 2025-06-02 NOTE — PROGRESS NOTES
"Physical Therapy Treatment    Patient Name: Diony Marte  MRN: [id number]  Today's Date: 6/2/2025   Visit 22/40  Time Calculation  Start Time: 1500  Stop Time: 1539  Time Calculation (min): 39 min  1. Status post labral repair of shoulder            PRECAUTIONS:  Fall Risk: None  (L) SLAP repair 1/30/2025 (protocol scanned)  Sling for 4 weeks, PROM, gentle AAROM and ISOMetrics; no resisted elbow flexion for 1 month    SUBJECTIVE:  Patient reports ongoing improvement with the (L) shoulder but some anterior shoulder pain with weight lifting. Had MRI arthrogram in a week and a half.  Pain level: 0-7/10  Compliant with HEP? Yes    OBJECTIVE:  (L) shoulder abduction PROM: 175 degrees with moderate pain  (L) shoulder ER PROM: 85 degrees with mild pain  Full PROM (L) shoulder in all other planes    (L) shoulder MMT flex: 4+/5  (L) shoulder MMT abd: 4+/5  (L) shoulder IR/ER MMT: 5/5    TREATMENT:  THERE-EX:  UBE fwd/rev 3'/3', L8  AAROM wallslides flex/abd x20 each  (L) shoulder flexion 5# 2x10  (L) shoulder abduction 8# 2x10  (L) shoulder crossbody stretch 10\"x10  (L) shoulder IR/ER BlkTB 3x10 each  Eccentric (L) shoulder flexion with BlkTB 2x10  Prone I's, Y's, T's 2x10 each LUE, 1#    MANUAL THERAPY:  PROM (L) shoulder in all planes per protocol    ASSESSMENT:  Patient's (L) shoulder improving in time. He still has some mild proximal pain in the (L) shoulder which may indicate ongoing proximal biceps tendon inflammation. Ongoing therapy will allow for full resolution of pain.    EDUCATION:    PLAN:   Reduce weight with forward and lateral arm raises at home to decreased inflammatory stress response.    "

## 2025-06-09 ENCOUNTER — TREATMENT (OUTPATIENT)
Dept: PHYSICAL THERAPY | Facility: CLINIC | Age: 40
End: 2025-06-09
Payer: COMMERCIAL

## 2025-06-09 DIAGNOSIS — Z98.890 STATUS POST LABRAL REPAIR OF SHOULDER: ICD-10-CM

## 2025-06-09 PROCEDURE — 97110 THERAPEUTIC EXERCISES: CPT | Mod: GP | Performed by: PHYSICAL THERAPIST

## 2025-06-09 NOTE — PROGRESS NOTES
Physical Therapy Treatment    Patient Name: Diony Marte  MRN: [id number]  Today's Date: 6/9/2025   Visit 23/40  Time Calculation  Start Time: 1505  Stop Time: 1540  Time Calculation (min): 35 min  1. Status post labral repair of shoulder            PRECAUTIONS:  Fall Risk: None  (L) SLAP repair 1/30/2025 (protocol scanned)  Sling for 4 weeks, PROM, gentle AAROM and ISOMetrics; no resisted elbow flexion for 1 month    SUBJECTIVE:  Patient reports ongoing improvement with the (L) shoulder but some anterior shoulder pain with weight lifting. Unable to perform specific lifting motions such as the bench press. Has MRI arthrogram next week.  Pain level: 0-7/10  Compliant with HEP? Yes    OBJECTIVE:  (L) shoulder abduction PROM: 180 degrees with moderate pain  (L) shoulder ER PROM: 90 degrees with mild pain  Full PROM (L) shoulder in all other planes    (L) shoulder MMT flex: 4+/5  (L) shoulder MMT abd: 4+/5  (L) shoulder IR/ER MMT: 5/5    TREATMENT:  THERE-EX:  UBE fwd/rev 3'/3', L8  AAROM wallslides flex/abd x20 each  (L) shoulder flexion 5# 2x10  (L) shoulder IR/ER BlkTB 3x10 each  Eccentric (L) shoulder flexion with BlkTB 2x10    MANUAL THERAPY:  PROM (L) shoulder in all planes per protocol    ASSESSMENT:  Patient has shown significant improvement with (L) shoulder but ongoing pain persists along the anterior (L) shoulder, along the proximal biceps tendon. Patient to undergo MRI arthrogram to reassess the quality of the labral repair as well as the status of the biceps tendon. Ongoing therapy may not provide further relief of symptoms as ROM is full and strength tests fine, just with pain.    EDUCATION:    PLAN:   Hold on PT and await results of arthrogram.

## 2025-06-10 ENCOUNTER — OFFICE VISIT (OUTPATIENT)
Dept: IMMUNOLOGY | Facility: CLINIC | Age: 40
End: 2025-06-10
Payer: COMMERCIAL

## 2025-06-10 DIAGNOSIS — Z77.21 PERSONAL HISTORY OF EXPOSURE TO POTENTIALLY HAZARDOUS BODY FLUIDS: ICD-10-CM

## 2025-06-10 LAB
ALBUMIN SERPL BCP-MCNC: 4.9 G/DL (ref 3.4–5)
ANION GAP SERPL CALC-SCNC: 10 MMOL/L (ref 10–20)
BUN SERPL-MCNC: 22 MG/DL (ref 6–23)
CALCIUM SERPL-MCNC: 9.7 MG/DL (ref 8.6–10.6)
CHLORIDE SERPL-SCNC: 103 MMOL/L (ref 98–107)
CO2 SERPL-SCNC: 28 MMOL/L (ref 21–32)
CREAT SERPL-MCNC: 1.02 MG/DL (ref 0.5–1.3)
EGFRCR SERPLBLD CKD-EPI 2021: >90 ML/MIN/1.73M*2
GLUCOSE SERPL-MCNC: 95 MG/DL (ref 74–99)
HIV 1+2 AB+HIV1 P24 AG SERPL QL IA: NONREACTIVE
PHOSPHATE SERPL-MCNC: 3.3 MG/DL (ref 2.5–4.9)
POTASSIUM SERPL-SCNC: 4.3 MMOL/L (ref 3.5–5.3)
SODIUM SERPL-SCNC: 137 MMOL/L (ref 136–145)
TREPONEMA PALLIDUM IGG+IGM AB [PRESENCE] IN SERUM OR PLASMA BY IMMUNOASSAY: NONREACTIVE

## 2025-06-10 PROCEDURE — 99214 OFFICE O/P EST MOD 30 MIN: CPT | Performed by: NURSE PRACTITIONER

## 2025-06-10 PROCEDURE — 87491 CHLMYD TRACH DNA AMP PROBE: CPT | Performed by: NURSE PRACTITIONER

## 2025-06-10 PROCEDURE — 36415 COLL VENOUS BLD VENIPUNCTURE: CPT | Performed by: NURSE PRACTITIONER

## 2025-06-10 PROCEDURE — 86780 TREPONEMA PALLIDUM: CPT | Performed by: NURSE PRACTITIONER

## 2025-06-10 PROCEDURE — 80069 RENAL FUNCTION PANEL: CPT | Performed by: NURSE PRACTITIONER

## 2025-06-10 PROCEDURE — 99214 OFFICE O/P EST MOD 30 MIN: CPT | Mod: GC | Performed by: NURSE PRACTITIONER

## 2025-06-10 PROCEDURE — 87389 HIV-1 AG W/HIV-1&-2 AB AG IA: CPT | Performed by: NURSE PRACTITIONER

## 2025-06-10 PROCEDURE — 1036F TOBACCO NON-USER: CPT | Performed by: NURSE PRACTITIONER

## 2025-06-10 ASSESSMENT — ENCOUNTER SYMPTOMS
DYSURIA: 0
HEADACHES: 1
SHORTNESS OF BREATH: 0
ABDOMINAL PAIN: 0
VOMITING: 0
CONSTIPATION: 0
NAUSEA: 0
ARTHRALGIAS: 0
CHEST TIGHTNESS: 0
AGITATION: 0
DYSPHORIC MOOD: 1
SORE THROAT: 0
DIARRHEA: 0

## 2025-06-10 NOTE — PROGRESS NOTES
HIV PrEP Clinic Follow-up Visit:    Diony Marte is a 39 y.o. male being seen for PrEP for prevention of HIV infection.  Current PrEP therapy:  Descovy daily not missing any doses, uses doxy PEP a few times a month    Risk factors for HIV infection include: (select all that apply):  MSM, has had 4 partners since last visit.    Smokes marijuana, and alcohol 4x/week    Regular condom use? Never  Received treatment for STD since last seen? No    Past Medical History  Medical History[1]    Allergies  Allergies[2]    Current Medications:  Current Medications[3]    Immunizations:  Immunization History   Administered Date(s) Administered    Flu vaccine (IIV4), preservative free *Check age/dose* 09/30/2022, 10/09/2023    Flu vaccine, quadrivalent, no egg protein, age 6 month or greater (FLUCELVAX) 09/11/2019    Flu vaccine, trivalent, preservative free, age 6 months and greater (Fluarix/Fluzone/Flulaval) 10/19/2015, 09/20/2024    HPV 9-valent vaccine (GARDASIL 9) 12/14/2021, 01/21/2022, 07/22/2022    Hep A, Unspecified 10/26/2015    Hepatitis A vaccine, age 19 years and greater (HAVRIX) 12/13/2018    Hepatitis A vaccine, pediatric/adolescent (HAVRIX, VAQTA) 10/26/2015    Hepatitis B vaccine, adult *Check Product/Dose* 01/21/2022, 05/05/2022, 07/22/2022    Influenza, Unspecified 11/12/2003, 11/02/2004, 11/18/2005, 11/22/2006, 10/20/2008, 10/19/2015, 09/12/2020    Influenza, seasonal, injectable 10/31/2014    MMR vaccine, subcutaneous (MMR II) 07/23/2021    Meningococcal MCV4, Unspecified 05/10/2006    Pfizer Gray Cap SARS-CoV-2 04/28/2022    Pfizer Purple Cap SARS-CoV-2 03/12/2021, 04/02/2021, 12/01/2021    Small pox and monkeypox vaccine (Jynneos) *check dose/route* 08/10/2022, 09/07/2022    Td vaccine, age 7 years and older (TDVAX) 09/30/2003    Tdap vaccine, age 7 year and older (BOOSTRIX, ADACEL) 10/26/2015, 01/21/2022    Vaccinia (smallpox) 09/07/2022       Review of systems  Review of Systems   HENT:  Negative  "for congestion, hearing loss, sneezing and sore throat.    Respiratory:  Negative for chest tightness and shortness of breath.    Cardiovascular:  Negative for chest pain and leg swelling.   Gastrointestinal:  Negative for abdominal pain, constipation, diarrhea, nausea and vomiting.   Genitourinary:  Negative for dysuria.   Musculoskeletal:  Negative for arthralgias.   Skin:  Negative for rash.   Neurological:  Positive for headaches (Occasional HA).   Psychiatric/Behavioral:  Positive for dysphoric mood (stable mood, has a psychiatrist). Negative for agitation.      Had shoulder surgery earlier this year - shoulder still hurts.   Repeat MRI is scheduled and then maybe he will get cortizone shot - does not believe he will opt for repeat surgery.    Did a trial of a bladder stimulater - considering permanent placement.  It did help about 50%.        PHYSICAL EXAMINATION:  Visit Vitals  Smoking Status Never       Physical Exam   Physical Exam  General: well-appearing, NAD  HEENT: NC/AT  Cardiac: rrr, no m/r/g  Lungs: normal work of breathing, CTAB  Abdomen: soft, non-tender, non-distended, BS present  Neuro: grossly intact  MSK: No peripheral edema, cyanosis, or pallor  Psych: no depression, anxiety    Pertinent data review:  HIV 1/2 Antigen/Antibody Screen with Reflex to Confirmation   Date Value Ref Range Status   03/13/2025 Nonreactive Nonreactive Final     Syphilis Total Ab   Date Value Ref Range Status   03/13/2025 Nonreactive Nonreactive Final     Comment:     No significant level of Treponema pallidum antibody detected.   Repeat testing in 2 to 4 weeks may be considered if early   infection or incubating syphilis infection is suspected.     No results found for: \"VDRLCSF\"  No results found for: \"RPR\"  Hepatitis C Ab   Date Value Ref Range Status   02/22/2019 NON-REACTIVE NONREACTIVE Final     Comment:      Patients receiving more than 5 mg/day of biotin may have interference   in test results.  A sample should " "be taken no sooner than eight hours   after  previous dose. Contact [phone] for additional information.        Hepatitis B Surface Ag   Date Value Ref Range Status   02/22/2019 NONREACTIVE NONREACTIVE Final     Comment:      Patients receiving more than 5 mg/day of biotin may have interference   in test results.  A sample should be taken no sooner than eight hours   after  previous dose. Contact [phone] for additional information.        Hepatitis B Surface Ab   Date Value Ref Range Status   08/08/2023 100.4 <10 mIU/mL Final     Comment:     INTERPRETIVE CRITERIA:  <10 mIU/mL....NONREACTIVE    >=10 mIU/mL...REACTIVE   .   Biotin interference may cause falsely decreased results.   Patients taking a Biotin dose of up to 5 mg/day should   refrain from taking Biotin for 24 hours before sample   collection. Providers may contact their local laboratory   for further information.       No results found for: \"HAVTO\"  GLUCOSE   Date Value Ref Range Status   05/13/2025 102 (H) 65 - 99 mg/dL Final     Comment:                   Fasting reference interval     For someone without known diabetes, a glucose value  between 100 and 125 mg/dL is consistent with  prediabetes and should be confirmed with a  follow-up test.          SODIUM   Date Value Ref Range Status   05/13/2025 140 135 - 146 mmol/L Final     POTASSIUM   Date Value Ref Range Status   05/13/2025 4.6 3.5 - 5.3 mmol/L Final     CHLORIDE   Date Value Ref Range Status   05/13/2025 103 98 - 110 mmol/L Final     ELECTROLYTE BALANCE   Date Value Ref Range Status   05/13/2025 8 7 - 17 mmol/L (calc) Final     UREA NITROGEN (BUN)   Date Value Ref Range Status   05/13/2025 11 7 - 25 mg/dL Final     CREATININE   Date Value Ref Range Status   05/13/2025 1.29 (H) 0.60 - 1.26 mg/dL Final     EGFR   Date Value Ref Range Status   05/13/2025 73 > OR = 60 mL/min/1.73m2 Final     CALCIUM   Date Value Ref Range Status   05/13/2025 9.9 8.6 - 10.3 mg/dL Final     Phosphorus   Date " Value Ref Range Status   03/13/2025 3.2 2.5 - 4.9 mg/dL Final     Comment:     MILD HEMOLYSIS DETECTED. The result may be falsely elevated due to hemolysis or other interferents. Clinical correlation is recommended. Repeat testing may be considered.  The performance characteristics of phosphorus testing in heparinized plasma have been validated by the individual  laboratory site where testing is performed. Testing on heparinized plasma is not approved by the FDA; however, such approval is not necessary.     ALBUMIN   Date Value Ref Range Status   05/13/2025 4.6 3.6 - 5.1 g/dL Final       Assessment and Plan:  Diony Marte is a 39 y.o.male seen today for evaluation of appropriateness for continuation of  PrEP therapy as they continue to be at greater risk for acquiring HIV infection.  HIV Ag/Ab, Syphilis testing, and renal function will be collected today.  GC NAAT testing will be obtained from 2 sites throat, and rectum.  If HIV testing negative, prescription for PrEP will be renewed.    Problem List Items Addressed This Visit       Personal history of exposure to potentially hazardous body fluids    Relevant Orders    C. trachomatis / N. gonorrhoeae, Amplified, Urogenital    HIV 1/2 Antigen/Antibody Screen with Reflex to Confirmation    Renal Function Panel    Syphilis Screen with Reflex     Patient seen and discussed with Bety Jain.    Amber Asher M.D.  Family Medicine  PGY-2    Thanks for coming in to see us today.  Please call wlith any questions or concerns.   Have a safe summer - stay hydrated.              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    Recurrent cold sores    Low back pain, unspecified 12/17/2018    Lumbar pain    Otalgia, bilateral 04/10/2021    Otalgia of both ears    Other specified disorders of nose and nasal sinuses 10/12/2020    Tenderness over frontal sinus    Otitis media, unspecified, bilateral 03/11/2021    BOM (bilateral otitis media)    Otitis media, unspecified, right ear 03/07/2017    Acute otitis media, right    Pain in right knee 03/11/2016    Acute pain of right knee    Personal history of other diseases of the musculoskeletal system and connective tissue 11/30/2020    History of muscle pain    Personal history of other diseases of the musculoskeletal system and connective tissue 11/06/2014    History of neck pain    Personal history of other diseases of the musculoskeletal system and connective tissue     History of radiculopathy    Personal history of other diseases of the respiratory system 04/10/2021    History of sore throat    Personal history of other diseases of the respiratory system 03/26/2018    History of acute sinusitis    Personal history of other diseases of the respiratory system 03/22/2018    History of sore throat    Personal history of other diseases of the respiratory system 04/03/2020    History of paranasal sinus congestion    Personal history of other diseases of the respiratory system     Personal history of asthma    Personal history of other endocrine, nutritional and metabolic disease 11/26/2019    History of hyperlipidemia    Personal history of other infectious and parasitic diseases 03/26/2018    History of viral infection    Personal history of other mental and behavioral disorders 11/20/2019    History of depression    Personal history of other specified conditions 11/30/2020    History of diarrhea    Personal history of other specified conditions 04/09/2021    History of dizziness    Personal history of peptic ulcer disease 02/27/2019    History of  gastric ulcer    Personal history of peptic ulcer disease     Personal history of gastric ulcer    Type 2 superior labrum extending from anterior to posterior (SLAP) lesion of left shoulder 12/17/2024    Unspecified otitis externa, right ear 03/22/2018    Right otitis externa   [2]   Allergies  Allergen Reactions    Midazolam Hallucinations    Amoxicillin Rash    Aripiprazole Unknown and Rash   [3]   Current Outpatient Medications:     ALPRAZolam (Xanax) 0.5 mg tablet, Take 1 tablet (0.5 mg) by mouth once daily at bedtime., Disp: , Rfl:     atorvastatin (Lipitor) 40 mg tablet, Take 1 tablet (40 mg) by mouth once daily at bedtime., Disp: 90 tablet, Rfl: 1    buPROPion XL (Wellbutrin XL) 150 mg 24 hr tablet, Take 2 tablets (300 mg) by mouth early in the morning.., Disp: , Rfl:     Descovy 200-25 mg tablet, Take 1 tablet by mouth once daily., Disp: 30 tablet, Rfl: 2    doxycycline (Vibra-Tabs) 100 mg tablet, TAKE 2 TABLETS WITHIN 24 TO 72 HOURS AFTER UNPROTECTED SEXUAL ENCOUNTER, NOT TO BE TAKEN DAILY. Take with a large glass of water and do not lie down for 30 minutes after., Disp: 30 tablet, Rfl: 0    DULoxetine (Cymbalta) 20 mg DR capsule, Take 2 capsules (40 mg) by mouth once daily. Do not crush or chew., Disp: 180 capsule, Rfl: 1    esomeprazole (NexIUM) 40 mg DR capsule, Take 1 capsule (40 mg) by mouth once daily in the morning. Take before meals., Disp: 90 capsule, Rfl: 1    finasteride (Proscar) 5 mg tablet, TAKE ONE-FOURTH (1/4) TABLET DAILY, Disp: 23 tablet, Rfl: 0    lamoTRIgine (LaMICtal) 200 mg tablet, Take 2 tablets (400 mg) by mouth once daily., Disp: , Rfl:     lurasidone (Latuda) 60 mg tablet, 2 tablets (120 mg) once daily., Disp: , Rfl:     tadalafil (Cialis) 10 mg tablet, Take 1 tablet (10 mg) by mouth if needed for erectile dysfunction., Disp: 15 tablet, Rfl: 3    valACYclovir (Valtrex) 1 gram tablet, TAKE 2 TABLETS EVERY 12 HOURS FOR 1 DAY AS NEEDED AT EARLY ONSET OF COLD SORES, Disp: 40 tablet,  Rfl: 0    Current Facility-Administered Medications:     ciprofloxacin (Cipro) tablet 500 mg, 500 mg, oral, Once, Celio Pierce MD MPH

## 2025-06-11 ENCOUNTER — HOSPITAL ENCOUNTER (OUTPATIENT)
Dept: RADIOLOGY | Facility: HOSPITAL | Age: 40
Discharge: HOME | End: 2025-06-11
Payer: COMMERCIAL

## 2025-06-11 DIAGNOSIS — S43.432D LABRAL TEAR OF SHOULDER, LEFT, SUBSEQUENT ENCOUNTER: ICD-10-CM

## 2025-06-11 DIAGNOSIS — Z77.21 PERSONAL HISTORY OF EXPOSURE TO POTENTIALLY HAZARDOUS BODY FLUIDS: ICD-10-CM

## 2025-06-11 LAB
C TRACH RRNA SPEC QL NAA+PROBE: NEGATIVE
N GONORRHOEA DNA SPEC QL PROBE+SIG AMP: NEGATIVE

## 2025-06-11 PROCEDURE — 73222 MRI JOINT UPR EXTREM W/DYE: CPT | Mod: LT

## 2025-06-11 PROCEDURE — 77002 NEEDLE LOCALIZATION BY XRAY: CPT | Mod: LT

## 2025-06-11 PROCEDURE — A9575 INJ GADOTERATE MEGLUMI 0.1ML: HCPCS | Performed by: STUDENT IN AN ORGANIZED HEALTH CARE EDUCATION/TRAINING PROGRAM

## 2025-06-11 PROCEDURE — 2500000004 HC RX 250 GENERAL PHARMACY W/ HCPCS (ALT 636 FOR OP/ED)

## 2025-06-11 PROCEDURE — 2550000001 HC RX 255 CONTRASTS: Performed by: STUDENT IN AN ORGANIZED HEALTH CARE EDUCATION/TRAINING PROGRAM

## 2025-06-11 RX ORDER — EMTRICITABINE AND TENOFOVIR ALAFENAMIDE 200; 25 MG/1; MG/1
1 TABLET ORAL DAILY
Qty: 30 TABLET | Refills: 2 | Status: SHIPPED | OUTPATIENT
Start: 2025-06-11

## 2025-06-11 RX ORDER — SODIUM CHLORIDE 9 MG/ML
10 INJECTION, SOLUTION INTRAMUSCULAR; INTRAVENOUS; SUBCUTANEOUS AS NEEDED
Status: CANCELLED | OUTPATIENT
Start: 2025-06-11

## 2025-06-11 RX ORDER — LIDOCAINE HYDROCHLORIDE 10 MG/ML
7 INJECTION, SOLUTION INFILTRATION; PERINEURAL ONCE
Status: CANCELLED | OUTPATIENT
Start: 2025-06-11 | End: 2025-06-11

## 2025-06-11 RX ORDER — GADOTERATE MEGLUMINE 376.9 MG/ML
0.2 INJECTION INTRAVENOUS
Status: COMPLETED | OUTPATIENT
Start: 2025-06-11 | End: 2025-06-11

## 2025-06-11 RX ORDER — LIDOCAINE HYDROCHLORIDE 10 MG/ML
INJECTION, SOLUTION INFILTRATION; PERINEURAL
Status: COMPLETED
Start: 2025-06-11 | End: 2025-06-11

## 2025-06-11 RX ADMIN — LIDOCAINE HYDROCHLORIDE 20 ML: 10 INJECTION, SOLUTION INFILTRATION; PERINEURAL at 13:49

## 2025-06-11 RX ADMIN — IOHEXOL 10 ML: 240 INJECTION, SOLUTION INTRATHECAL; INTRAVASCULAR; INTRAVENOUS; ORAL at 13:50

## 2025-06-11 RX ADMIN — GADOTERATE MEGLUMINE 0.2 ML: 376.9 INJECTION INTRAVENOUS at 13:50

## 2025-06-16 ENCOUNTER — APPOINTMENT (OUTPATIENT)
Dept: PHYSICAL THERAPY | Facility: CLINIC | Age: 40
End: 2025-06-16
Payer: COMMERCIAL

## 2025-06-16 DIAGNOSIS — Z98.890 STATUS POST LABRAL REPAIR OF SHOULDER: ICD-10-CM

## 2025-06-20 ENCOUNTER — OFFICE VISIT (OUTPATIENT)
Dept: ORTHOPEDIC SURGERY | Facility: HOSPITAL | Age: 40
End: 2025-06-20
Payer: COMMERCIAL

## 2025-06-20 DIAGNOSIS — S43.432D LABRAL TEAR OF SHOULDER, LEFT, SUBSEQUENT ENCOUNTER: Primary | ICD-10-CM

## 2025-06-20 PROCEDURE — 99213 OFFICE O/P EST LOW 20 MIN: CPT | Performed by: ORTHOPAEDIC SURGERY

## 2025-06-20 PROCEDURE — 99212 OFFICE O/P EST SF 10 MIN: CPT | Performed by: ORTHOPAEDIC SURGERY

## 2025-06-20 ASSESSMENT — PAIN SCALES - GENERAL: PAINLEVEL_OUTOF10: 2

## 2025-06-20 ASSESSMENT — PAIN - FUNCTIONAL ASSESSMENT: PAIN_FUNCTIONAL_ASSESSMENT: 0-10

## 2025-06-20 NOTE — PROGRESS NOTES
Is here for reevaluation of his shoulder and discussion of an MRI scan with arthrogram obtained recently.  Subjectively the patient is improving he was weightlifting yesterday and did have some achy pain on the front of his shoulder that seems to be getting a little bit better but not definitively.    MRI scan shows what appears to be intact biceps anchor and superior labrum.  Incidental finding of contrast within the inferior shoulder tracking under the inferior glenohumeral ligament was noted.  No clear labrum tear inferiorly.  This was compared to previous MRI arthrogram and also to operative notes and prior to and at the time of his surgery there is no evidence of inferior glenoid labrum pathology.    We discussed the possible reasons for these findings and the potential treatment options including waiting, continued therapy, cortisone injection, revision surgery with diagnostic arthroscopy and treatment as needed.    After discussion of these options patient elected to try a period of continued rehabilitation on his own and will follow-up in 3 to 4 weeks if he has persistent symptoms I would recommend intra-articular cortisone injection.    This was dictated using voice recognition software and not corrected for grammatical or spelling errors.

## 2025-06-23 ENCOUNTER — APPOINTMENT (OUTPATIENT)
Dept: PHYSICAL THERAPY | Facility: CLINIC | Age: 40
End: 2025-06-23
Payer: COMMERCIAL

## 2025-06-23 DIAGNOSIS — Z98.890 STATUS POST LABRAL REPAIR OF SHOULDER: ICD-10-CM

## 2025-06-24 DIAGNOSIS — B00.1 RECURRENT COLD SORES: ICD-10-CM

## 2025-06-25 ENCOUNTER — TELEPHONE (OUTPATIENT)
Dept: PHYSICAL THERAPY | Facility: CLINIC | Age: 40
End: 2025-06-25
Payer: COMMERCIAL

## 2025-06-30 ENCOUNTER — APPOINTMENT (OUTPATIENT)
Dept: PHYSICAL THERAPY | Facility: CLINIC | Age: 40
End: 2025-06-30
Payer: COMMERCIAL

## 2025-06-30 DIAGNOSIS — Z98.890 STATUS POST LABRAL REPAIR OF SHOULDER: ICD-10-CM

## 2025-06-30 RX ORDER — VALACYCLOVIR HYDROCHLORIDE 1 G/1
TABLET, FILM COATED ORAL
Qty: 24 TABLET | Refills: 3 | OUTPATIENT
Start: 2025-06-30

## 2025-07-02 ENCOUNTER — CLINICAL SUPPORT (OUTPATIENT)
Dept: PREADMISSION TESTING | Facility: HOSPITAL | Age: 40
End: 2025-07-02
Payer: COMMERCIAL

## 2025-07-02 RX ORDER — PSYLLIUM HUSK 0.4 G
2 CAPSULE ORAL 4 TIMES DAILY
COMMUNITY

## 2025-07-02 NOTE — CPM/PAT NURSE NOTE
"CPM/PAT Nurse Note      Name: Diony Marte (Diony Marte \"Leandro\")  /Age: 1985/39 y.o.       Medical History[1]    Surgical History[2]    Patient Sexual activity questions deferred to the physician.    Family History[3]    Allergies[4]    Prior to Admission medications    Medication Sig Start Date End Date Taking? Authorizing Provider   ALPRAZolam (Xanax) 0.5 mg tablet Take 1 tablet (0.5 mg) by mouth once daily at bedtime.    Historical Provider, MD   aspirin-acetaminophen-caffeine (Excedrin Migraine) 250-250-65 mg tablet Take 1 tablet by mouth every 8 hours if needed for headaches.    Historical Provider, MD   atorvastatin (Lipitor) 40 mg tablet Take 1 tablet (40 mg) by mouth once daily at bedtime. 25   Hay Robledo MD   buPROPion XL (Wellbutrin XL) 150 mg 24 hr tablet Take 1 tablet (150 mg) by mouth early in the morning.. 24   Historical Provider, MD   Descovy 200-25 mg tablet Take 1 tablet by mouth once daily. 25   PLACIDO Alberto   doxycycline (Vibra-Tabs) 100 mg tablet TAKE 2 TABLETS WITHIN 24 TO 72 HOURS AFTER UNPROTECTED SEXUAL ENCOUNTER, NOT TO BE TAKEN DAILY. Take with a large glass of water and do not lie down for 30 minutes after. 4/10/25   PLACIDO Alberto   DULoxetine (Cymbalta) 20 mg DR capsule Take 2 capsules (40 mg) by mouth once daily. Do not crush or chew. 25  Hay Robledo MD   esomeprazole (NexIUM) 40 mg DR capsule Take 1 capsule (40 mg) by mouth once daily in the morning. Take before meals. 25   Hay Robledo MD   finasteride (Proscar) 5 mg tablet TAKE ONE-FOURTH (/) TABLET DAILY 25   Hay Robledo MD   lamoTRIgine (LaMICtal) 200 mg tablet Take 2 tablets (400 mg) by mouth once daily. 17   Historical Provider, MD   lurasidone (Latuda) 60 mg tablet 2 tablets (120 mg) once daily. 10/27/23   Historical Provider, MD   psyllium (Metamucil) 0.4 gram capsule Take 2 capsules by mouth 4 times a day.    " Historical Provider, MD   tadalafil (Cialis) 10 mg tablet Take 1 tablet (10 mg) by mouth if needed for erectile dysfunction. 10/28/24 6/10/25  Celio Pierce MD MPH   valACYclovir (Valtrex) 1 gram tablet TAKE 2 TABLETS EVERY 12 HOURS FOR 1 DAY AS NEEDED AT EARLY ONSET OF COLD SORES 3/26/25   Hay Robledo MD        PAT ROS     DASI Risk Score      Flowsheet Row Pre-Admission Testing from 12/31/2024 in [facility]   Can you take care of yourself (eat, dress, bathe, or use toilet)?  2.75 filed at 12/31/2024 0932   Can you walk indoors, such as around your house? 1.75 filed at 12/31/2024 0932   Can you walk a block or two on level ground?  2.75 filed at 12/31/2024 0932   Can you climb a flight of stairs or walk up a hill? 5.5 filed at 12/31/2024 0932   Can you run a short distance? 8 filed at 12/31/2024 0932   Can you do light work around the house like dusting or washing dishes? 2.7 filed at 12/31/2024 0932   Can you do moderate work around the house like vacuuming, sweeping floors or carrying groceries? 3.5 filed at 12/31/2024 0932   Can you do heavy work around the house like scrubbing floors or lifting and moving heavy furniture?  8 filed at 12/31/2024 0932   Can you do yard work like raking leaves, weeding or pushing a mower? 4.5 filed at 12/31/2024 0932   Can you have sexual relations? 5.25 filed at 12/31/2024 0932   Can you participate in moderate recreational activities like golf, bowling, dancing, doubles tennis or throwing a baseball or football? 6 filed at 12/31/2024 0932   Can you participate in strenous sports like swimming, singles tennis, football, basketball, or skiing? 0 filed at 12/31/2024 0932   DASI SCORE 50.7 filed at 12/31/2024 0932   METS Score (Will be calculated only when all the questions are answered) 9 filed at 12/31/2024 0932          Caprini DVT Assessment      Flowsheet Row Pre-Admission Testing from 12/31/2024 in [facility]   DVT Score (IF A  SCORE IS NOT CALCULATING, MUST SELECT A BMI TO COMPLETE) 3 filed at 12/31/2024 0931   Surgical Factors Major surgery planned, including arthroscopic and laproscopic (1-2 hours) filed at 12/31/2024 0931   BMI (BMI MUST BE CHOSEN) 30 or less filed at 12/31/2024 0931          Modified Frailty Index    No data to display       [id number] Stroke Risk Points  Current as of just now        N/A 0 to 9 Points:      Last Change: N/A          The [id number] risk score (Lip WILBERTO, et al. 2009. © 2010 [employer]) quantifies the risk of stroke for a patient with atrial fibrillation. For patients without atrial fibrillation or under the age of 18 this score appears as N/A. Higher score values generally indicate higher risk of stroke.        This score is not applicable to this patient. Components are not calculated.          Revised Cardiac Risk Index      Flowsheet Row Pre-Admission Testing from 12/31/2024 in [facility]   High-Risk Surgery (Intraperitoneal, Intrathoracic,Suprainguinal vascular) 0 filed at 12/31/2024 0932   History of ischemic heart disease (History of MI, History of positive exercuse test, Current chest paint considered due to myocardial ischemia, Use of nitrate therapy, ECG with pathological Q Waves) 0 filed at 12/31/2024 0932   History of congestive heart failure (pulmonary edemia, bilateral rales or S3 gallop, Paroxysmal nocturnal dyspnea, CXR showing pulmonary vascular redistribution) 0 filed at 12/31/2024 0932   History of cerebrovascular disease (Prior TIA or stroke) 0 filed at 12/31/2024 0932   Pre-operative insulin treatment 0 filed at 12/31/2024 0932   Pre-operative creatinine>2 mg/dl 0 filed at 12/31/2024 0932   Revised Cardiac Risk Calculator 0 filed at 12/31/2024 0932          Apfel Simplified Score    No data to display       Risk Analysis Index Results This Encounter    No data found in the last 10 encounters.       Stop Bang Score      Flowsheet Row  Admission (Discharged) from 1/30/2025 in [facility] OR with Magdi Mejia MD Pre-Admission Testing from 12/31/2024 in [facility]   Do you snore loudly? 1  [Hx MONTEZ lost weight since] filed at 01/30/2025 1040 0 filed at 12/31/2024 0840   Do you often feel tired or fatigued after your sleep? 1 filed at 01/30/2025 1040 0 filed at 12/31/2024 0840   Has anyone ever observed you stop breathing in your sleep? 1 filed at 01/30/2025 1040 1 filed at 12/31/2024 0840   Do you have or are you being treated for high blood pressure? 1 filed at 01/30/2025 1040 1 filed at 12/31/2024 0840   Recent BMI (Calculated) 24.8 filed at 01/30/2025 1040 24.8 filed at 12/31/2024 0840   Is BMI greater than 35 kg/m2? 0=No filed at 01/30/2025 1040 0=No filed at 12/31/2024 0840   Age older than 50 years old? 0=No filed at 01/30/2025 1040 0=No filed at 12/31/2024 0840   Is your neck circumference greater than 17 inches (Male) or 16 inches (Female)? 1 filed at 01/30/2025 1040 0 filed at 12/31/2024 0840   Gender - Male 1=Yes filed at 01/30/2025 1040 1=Yes filed at 12/31/2024 0840   STOP-BANG Total Score 6 filed at 01/30/2025 1040 3 filed at 12/31/2024 0840          Prodigy: High Risk  Total Score: 8              Prodigy Gender Score          ARISCAT Score for Postoperative Pulmonary Complications      Flowsheet Row Pre-Admission Testing from 12/31/2024 in [facility]   Age Calculated Score 0 filed at 12/31/2024 0932   Preoperative SpO2 0 filed at 12/31/2024 0932   Respiratory infection in the last month Either upper or lower (i.e., URI, bronchitis, pneumonia), with fever and antibiotic treatment 0 filed at 12/31/2024 0932   Preoperative anemia (Hgb less than 10 g/dl) 0 filed at 12/31/2024 0932   Surgical incision  0 filed at 12/31/2024 0932   Duration of surgery  0 filed at 12/31/2024 0932   Emergency Procedure  0 filed at 12/31/2024 0932   ARISCAT Total Score  0 filed at 12/31/2024 0932           Praneeth Perioperative Risk for Myocardial Infarction or Cardiac Arrest (JACOB)      Flowsheet Row Pre-Admission Testing from 12/31/2024 in [facility]   Calculated Age Score 0.74 filed at 12/31/2024 0932   Functional Status  0 filed at 12/31/2024 0932   ASA Class  -3.29 filed at 12/31/2024 0932   Creatinine 0 filed at 12/31/2024 0932   Type of Procedure  0.80 filed at 12/31/2024 0932   JACOB Total Score  -7 filed at 12/31/2024 0932   JACOB % 0.09 filed at 12/31/2024 0932            Nurse Plan of Action:     RN screening call complete.  Reviewed allergies, medications and pharmacy, medical, surgical and social history with patient.  Chart updated.  Instructed patient to stop vitamins/supplements/NSAIds now and Cialis 3 day stop prior to surgery.             [1]   Past Medical History:  Diagnosis Date    Anxiety     Bipolar disorder     Depression     GERD (gastroesophageal reflux disease)     Hyperlipidemia     OAB (overactive bladder)    [2]   Past Surgical History:  Procedure Laterality Date    COLONOSCOPY      SHOULDER ARTHROSCOPY W/ LABRAL REPAIR Left 01/2025    TONSILLECTOMY      Tonsillectomy    UPPER GASTROINTESTINAL ENDOSCOPY     [3]   Family History  Problem Relation Name Age of Onset    Breast cancer Mother      Cancer Father          appendix    Lupus Sister      Heart disease Father's Sister afib     Hyperlipidemia Other      Breast cancer Other      Diabetes Other      Neuropathy Other      Depression Other      Alcohol abuse Other      Colon cancer Other      Autism spectrum disorder Other     [4]   Allergies  Allergen Reactions    Midazolam Hallucinations    Amoxicillin Rash    Aripiprazole Rash

## 2025-07-07 ENCOUNTER — DOCUMENTATION (OUTPATIENT)
Dept: PREADMISSION TESTING | Facility: HOSPITAL | Age: 40
End: 2025-07-07

## 2025-07-07 ENCOUNTER — PRE-ADMISSION TESTING (OUTPATIENT)
Dept: PREADMISSION TESTING | Facility: HOSPITAL | Age: 40
End: 2025-07-07
Payer: COMMERCIAL

## 2025-07-07 ENCOUNTER — APPOINTMENT (OUTPATIENT)
Dept: LAB | Facility: HOSPITAL | Age: 40
End: 2025-07-07
Payer: COMMERCIAL

## 2025-07-07 VITALS
DIASTOLIC BLOOD PRESSURE: 70 MMHG | RESPIRATION RATE: 18 BRPM | WEIGHT: 194 LBS | SYSTOLIC BLOOD PRESSURE: 117 MMHG | HEIGHT: 76 IN | HEART RATE: 59 BPM | TEMPERATURE: 97.8 F | OXYGEN SATURATION: 98 % | BODY MASS INDEX: 23.62 KG/M2

## 2025-07-07 DIAGNOSIS — Z01.818 ENCOUNTER FOR OTHER PREPROCEDURAL EXAMINATION: ICD-10-CM

## 2025-07-07 DIAGNOSIS — D64.9 ANEMIA, UNSPECIFIED TYPE: Primary | ICD-10-CM

## 2025-07-07 DIAGNOSIS — R35.0 URINARY FREQUENCY: ICD-10-CM

## 2025-07-07 DIAGNOSIS — D64.9 ANEMIA, UNSPECIFIED: Primary | ICD-10-CM

## 2025-07-07 DIAGNOSIS — Z01.818 PREPROCEDURAL EXAMINATION: ICD-10-CM

## 2025-07-07 DIAGNOSIS — R31.21 ASYMPTOMATIC MICROSCOPIC HEMATURIA: ICD-10-CM

## 2025-07-07 LAB
ALBUMIN SERPL BCP-MCNC: 4.5 G/DL (ref 3.4–5)
ALP SERPL-CCNC: 44 U/L (ref 33–120)
ALT SERPL W P-5'-P-CCNC: 26 U/L (ref 10–52)
ANION GAP SERPL CALC-SCNC: 11 MMOL/L (ref 10–20)
APPEARANCE UR: CLEAR
AST SERPL W P-5'-P-CCNC: 17 U/L (ref 9–39)
BASOPHILS # BLD AUTO: 0.06 X10*3/UL (ref 0–0.1)
BASOPHILS NFR BLD AUTO: 1.2 %
BILIRUB SERPL-MCNC: 0.4 MG/DL (ref 0–1.2)
BILIRUB UR STRIP.AUTO-MCNC: NEGATIVE MG/DL
BUN SERPL-MCNC: 16 MG/DL (ref 6–23)
CALCIUM SERPL-MCNC: 9.5 MG/DL (ref 8.6–10.3)
CHLORIDE SERPL-SCNC: 105 MMOL/L (ref 98–107)
CO2 SERPL-SCNC: 26 MMOL/L (ref 21–32)
COLOR UR: YELLOW
CREAT SERPL-MCNC: 1.12 MG/DL (ref 0.5–1.3)
EGFRCR SERPLBLD CKD-EPI 2021: 87 ML/MIN/1.73M*2
EOSINOPHIL # BLD AUTO: 0.09 X10*3/UL (ref 0–0.7)
EOSINOPHIL NFR BLD AUTO: 1.8 %
ERYTHROCYTE [DISTWIDTH] IN BLOOD BY AUTOMATED COUNT: 12.1 % (ref 11.5–14.5)
EST. AVERAGE GLUCOSE BLD GHB EST-MCNC: 97 MG/DL
GLUCOSE SERPL-MCNC: 91 MG/DL (ref 74–99)
GLUCOSE UR STRIP.AUTO-MCNC: NORMAL MG/DL
HBA1C MFR BLD: 5 % (ref ?–5.7)
HCT VFR BLD AUTO: 39.5 % (ref 41–52)
HGB BLD-MCNC: 13.2 G/DL (ref 13.5–17.5)
IMM GRANULOCYTES # BLD AUTO: 0.01 X10*3/UL (ref 0–0.7)
IMM GRANULOCYTES NFR BLD AUTO: 0.2 % (ref 0–0.9)
KETONES UR STRIP.AUTO-MCNC: NEGATIVE MG/DL
LEUKOCYTE ESTERASE UR QL STRIP.AUTO: NEGATIVE
LYMPHOCYTES # BLD AUTO: 2.44 X10*3/UL (ref 1.2–4.8)
LYMPHOCYTES NFR BLD AUTO: 48 %
MCH RBC QN AUTO: 31.4 PG (ref 26–34)
MCHC RBC AUTO-ENTMCNC: 33.4 G/DL (ref 32–36)
MCV RBC AUTO: 94 FL (ref 80–100)
MONOCYTES # BLD AUTO: 0.38 X10*3/UL (ref 0.1–1)
MONOCYTES NFR BLD AUTO: 7.5 %
NEUTROPHILS # BLD AUTO: 2.1 X10*3/UL (ref 1.2–7.7)
NEUTROPHILS NFR BLD AUTO: 41.3 %
NITRITE UR QL STRIP.AUTO: NEGATIVE
NRBC BLD-RTO: 0 /100 WBCS (ref 0–0)
PH UR STRIP.AUTO: 7 [PH]
PLATELET # BLD AUTO: 301 X10*3/UL (ref 150–450)
POTASSIUM SERPL-SCNC: 4.3 MMOL/L (ref 3.5–5.3)
PROT SERPL-MCNC: 7.3 G/DL (ref 6.4–8.2)
PROT UR STRIP.AUTO-MCNC: NEGATIVE MG/DL
RBC # BLD AUTO: 4.21 X10*6/UL (ref 4.5–5.9)
RBC # UR STRIP.AUTO: NEGATIVE MG/DL
SODIUM SERPL-SCNC: 138 MMOL/L (ref 136–145)
SP GR UR STRIP.AUTO: 1.03
UROBILINOGEN UR STRIP.AUTO-MCNC: NORMAL MG/DL
WBC # BLD AUTO: 5.1 X10*3/UL (ref 4.4–11.3)

## 2025-07-07 PROCEDURE — 81003 URINALYSIS AUTO W/O SCOPE: CPT | Performed by: NURSE PRACTITIONER

## 2025-07-07 PROCEDURE — 87081 CULTURE SCREEN ONLY: CPT | Mod: AHULAB | Performed by: NURSE PRACTITIONER

## 2025-07-07 PROCEDURE — 83036 HEMOGLOBIN GLYCOSYLATED A1C: CPT

## 2025-07-07 PROCEDURE — 85025 COMPLETE CBC W/AUTO DIFF WBC: CPT

## 2025-07-07 PROCEDURE — 80053 COMPREHEN METABOLIC PANEL: CPT

## 2025-07-07 RX ORDER — CHLORHEXIDINE GLUCONATE ORAL RINSE 1.2 MG/ML
15 SOLUTION DENTAL DAILY
Qty: 30 ML | Refills: 0 | Status: SHIPPED | OUTPATIENT
Start: 2025-07-07 | End: 2025-07-09

## 2025-07-07 ASSESSMENT — ENCOUNTER SYMPTOMS
CONSTITUTIONAL NEGATIVE: 1
RESPIRATORY NEGATIVE: 1
GASTROINTESTINAL NEGATIVE: 1
CARDIOVASCULAR NEGATIVE: 1
BRUISES/BLEEDS EASILY: 1
NECK NEGATIVE: 1
ARTHRALGIAS: 1

## 2025-07-07 NOTE — PREPROCEDURE INSTRUCTIONS
Medication List            Accurate as of July 7, 2025 10:48 AM. Always use your most recent med list.                ALPRAZolam 0.5 mg tablet  Commonly known as: Xanax  Medication Adjustments for Surgery: Take/Use as prescribed     aspirin-acetaminophen-caffeine 250-250-65 mg tablet  Commonly known as: Excedrin Migraine  Additional Medication Adjustments for Surgery: Take last dose 7 days before surgery     atorvastatin 40 mg tablet  Commonly known as: Lipitor  Take 1 tablet (40 mg) by mouth once daily at bedtime.  Medication Adjustments for Surgery: Take on the morning of surgery     buPROPion  mg 24 hr tablet  Commonly known as: Wellbutrin XL  Medication Adjustments for Surgery: Take on the morning of surgery     chlorhexidine 0.12 % solution  Commonly known as: Peridex  Use 15 mL in the mouth or throat once daily for 2 days.  Medication Adjustments for Surgery: Take/Use as prescribed     Descovy 200-25 mg tablet  Generic drug: emtricitabine-tenofovir alafen  Take 1 tablet by mouth once daily.  Medication Adjustments for Surgery: Take on the morning of surgery     doxycycline 100 mg tablet  Commonly known as: Vibra-Tabs  TAKE 2 TABLETS WITHIN 24 TO 72 HOURS AFTER UNPROTECTED SEXUAL ENCOUNTER, NOT TO BE TAKEN DAILY. Take with a large glass of water and do not lie down for 30 minutes after.  Medication Adjustments for Surgery: Take/Use as prescribed     DULoxetine 20 mg DR capsule  Commonly known as: Cymbalta  Take 2 capsules (40 mg) by mouth once daily. Do not crush or chew.  Medication Adjustments for Surgery: Take on the morning of surgery     esomeprazole 40 mg DR capsule  Commonly known as: NexIUM  Take 1 capsule (40 mg) by mouth once daily in the morning. Take before meals.  Medication Adjustments for Surgery: Take on the morning of surgery     finasteride 5 mg tablet  Commonly known as: Proscar  TAKE ONE-FOURTH (1/4) TABLET DAILY  Medication Adjustments for Surgery: Take on the morning of surgery      lamoTRIgine 200 mg tablet  Commonly known as: LaMICtal  Medication Adjustments for Surgery: Take on the morning of surgery     lurasidone 60 mg tablet  Commonly known as: Latuda  Medication Adjustments for Surgery: Take on the morning of surgery     psyllium 0.4 gram capsule  Commonly known as: Metamucil  Medication Adjustments for Surgery: Do Not take on the morning of surgery     tadalafil 10 mg tablet  Commonly known as: Cialis  Take 1 tablet (10 mg) by mouth if needed for erectile dysfunction.  Medication Adjustments for Surgery: Take last dose 3 days before surgery     valACYclovir 1 gram tablet  Commonly known as: Valtrex  TAKE 2 TABLETS EVERY 12 HOURS FOR 1 DAY AS NEEDED AT EARLY ONSET OF COLD SORES  Medication Adjustments for Surgery: Take/Use as prescribed                   CONTACT SURGEON'S OFFICE IF YOU DEVELOP:  * Fever = 100.4 F   * New respiratory symptoms (e.g. cough, shortness of breath, respiratory distress, sore throat)  * Recent loss of taste or smell  *Flu like symptoms such as headache, fatigue or gastrointestinal symptoms  * You develop any open sores, shingles, burning or painful urination   AND/OR:  * You no longer wish to have the surgery.  * Any other personal circumstances change that may lead to the need to cancel or defer this surgery.  *You were admitted to any hospital within one week of your planned procedure.    SMOKING:  *Quitting smoking can make a huge difference to your health and recovery from surgery.    *If you need help with quitting, call [phone].    THE DAY BEFORE SURGERY:  *Do not eat any food after midnight the night before surgery.   You may have up to 13.5 ounces of clear liquid until TWO hours before your instructed arrival time to the hospital  DIABETICS:  Please check fasting blood sugar  upon waking up.  If fasting sugar is <80 mg/dl, please drink 100ml/3oz of apple juice no later than 2 hours prior to surgery.      SURGICAL TIME  *You will be contacted  between 2 p.m. and 6 p.m. the business day before your surgery with your arrival time.  *If you haven't received a call by 6pm, call [phone].  *Scheduled surgery times may change and you will be notified if this occurs-check your personal voicemail for any updates.    ON THE MORNING OF SURGERY:  *Wear comfortable, loose fitting clothing.   *Do not use moisturizers, creams, lotions or perfume.  *All jewelry and valuables should be left at home.  *Prosthetic devices such as contact lenses, hearing aids, dentures, eyelash extensions, hairpins and body piercing must be removed before surgery.    BRING WITH YOU:  *Photo ID and insurance card  *Current list of medicines and allergies  *Pacemaker/Defibrillator/Heart stent cards  *CPAP machine and mask  *Slings/splints/crutches  *Copy of your complete Advanced Directive/DHPOA-if applicable  *Neurostimulator implant remote    PARKING AND ARRIVAL:  *Check in at the Main Entrance desk and let them know you are here for surgery.  *You will be directed to the 2nd floor surgical waiting area.    AFTER OUTPATIENT SURGERY:  *A responsible adult MUST accompany you at the time of discharge and stay with you for 24 hours after your surgery.  *You may NOT drive yourself home after surgery.  *You may use a taxi or ride sharing service ([employer], Uber) to return home ONLY if you are accompanied by a friend or family member.  *Instructions for resuming your medications will be provided by your surgeon.    Home Preoperative Antibacterial Shower     What is a home preoperative antibacterial shower?  This shower is a way of cleaning the skin with a germ killing soap before surgery.  The soap contains chlorhexidine, commonly known as CHG.  CHG is a soap for your skin with germ killing ability.  Let your doctor know if you are allergic to chlorhexidine.    Why do I need to take a preoperative antibacterial shower?  Skin is not sterile.  It is best to try to make your skin as free of germs as  possible before surgery.  Proper cleansing with a germ killing soap before surgery can lower the number of germs on your skin.  This helps to reduce the risk of infection at the surgical site.  Following the instructions listed below will help you prepare your skin for surgery.      How do I use the CHG skin cleanser?  Steps:  Begin using your CHG soap five days before your scheduled surgery on ________________________.    Days 1-4 Shower before bed:  Wash your face and genitals with your normal soap and rinse.           2.    Apply the CHG soap to a clean wet washcloth.  Turn the water off or move away                From the water spray to avoid premature rinsing of the CHG soap as you are applying.     3.   Lather your entire body from the neck down.  Do not use on your face or genitals.  4. Pay special attention to the area(s) where your incision(s) will be located unless they are on your face.  Avoid scrubbing your skin too hard.  The important point is to have the CHG soap sit on your skin for 3 minutes.    When the 3 minutes are up, turn on the water and rinse the CHG soap off your body completely.   Pat yourself dry with a clean, freshly-laundered towel.  Dress in clean, freshly laundered night clothes.    Be sure to change bed sheets and blankets at least on the first night of CHG body wash use. May change linens every night of the above protocol for maximum benefit.   Day 5:  Last shower is the morning of surgery: Follow above Instructions.    NOTE:        *Keep CHG soap out of eyes and ear canals   *DO NOT wash with regular soap on your body after you have used the CHG soap solution  *DO NOT apply powders, lotions, or perfume.  *Deodorant may be used days 1-4, BUT NOT the day of surgery    Who should I contact if I have any questions regarding the use of CHG soap?  Call the [facility], Preadmission Testing at [phone] if you have any questions.              Patient  Information: Pre-Operative Infection Prevention Measures     Why did I have my nose, under my arms and groin swabbed?  The purpose of the swab is to identify Staphylococcus aureus inside your nose or on your skin.  The swab was sent to the laboratory for culture.  A positive swab/culture for Staphylococcus aureus is called colonization or carriage.      What is Staphylococcus aureus?  Staphylococcus aureus, also known as “staph”, is a germ found on the skin or in the nose of healthy people.  Sometimes Staphylococcus aureus can get into the body and cause an infection.  This can be minor (such as pimples, boils or other skin problems).  It might also be serious (such as blood infection, pneumonia or a surgical site infection).    What is Staphylococcus aureus colonization or carriage?  Colonization or carriage means that a person has the germ but is not sick from it.  These bacteria can be spread on the hands or when breathing or sneezing.    How is Staphylococcus aureus spread?  It is most often spread by close contact with a person or item that carries it.    What happens if my culture is positive for Staphylococcus aureus?  Your doctor/medical team will use this information to guide any antibiotic treatment which may be necessary.  Regardless of the culture results, we will clean the inside of your nose with a betadine swab just before you have your surgery.      Will I get an infection if I have Staphylococcus aureus in my nose or on my skin?  Anyone can get an infection with Staphylococcus aureus.  However, the best way to reduce your risk of infection is to follow the instructions provided to you for the use of your CHG soap and dental rinse.        Who should I contact if I have any questions?  Call the [facility], Preadmission Testing at [phone] if you have any questions.          Patient Information: Oral/Dental Rinse  **This is a prescription; pick it up at your preferred  local pharmacy **  What is oral/dental rinse?   It is a mouthwash. It is a way of cleaning the mouth with a germ killing solution before your surgery.  The solution contains chlorhexidine, commonly known as CHG.   It is used inside the mouth to kill a bacteria known as Staphylococcus aureus.  Let your doctor know if you are allergic to Chlorhexidine.    Why do I need to use CHG oral/dental rinse?  The CHG oral/dental rinse helps to kill a bacteria in your mouth known a Staphylococcus aureus.     This reduces the risk of infection at the surgical site.      Using your CHG oral/dental rinse  STEPS:  Use your CHG oral/dental rinse after you brush your teeth the night before (at bedtime) and the morning of your surgery.  Follow all directions on your prescription label.    Use the cap on the container to measure 15ml (fill cap to fill line)  Swish (gargle if you can) the mouthwash in your mouth for at least 30 seconds, (do not to swallow) spit out  After you use your CHG rinse, do not rinse your mouth with water, drink or eat.  Please refer to prescription label for the appropriate time to resume oral intake  Dental rinse comes in one size bottle: 473ml ~16oz.  You will have leftover    rinse, discard after this use.    What side effects might I have using the CHG oral/dental rinse?  CHG rinse will stick to plaque on the teeth.  Brush and floss just before use.  Teeth brushing will help avoid staining of plaque during use.    Who should I contact if I have questions about the CHG oral/dental rinse?  Please call [facility], Preadmission Testing at [phone] if you have any questions

## 2025-07-07 NOTE — CPM/PAT NURSE NOTE
"[facility]/PAT Nurse Note      Name: Diony Marte (Diony Marte \"Leandro\")  /Age: 1985/39 y.o.       Medical History[1]    Surgical History[2]    Patient Sexual activity questions deferred to the physician.    Family History[3]    Allergies[4]    Prior to Admission medications    Medication Sig Start Date End Date Taking? Authorizing Provider   ALPRAZolam (Xanax) 0.5 mg tablet Take 1 tablet (0.5 mg) by mouth once daily at bedtime.    Historical Provider, MD   aspirin-acetaminophen-caffeine (Excedrin Migraine) 250-250-65 mg tablet Take 1 tablet by mouth every 8 hours if needed for headaches.    Historical Provider, MD   atorvastatin (Lipitor) 40 mg tablet Take 1 tablet (40 mg) by mouth once daily at bedtime. 25   Hay Robledo MD   buPROPion XL (Wellbutrin XL) 150 mg 24 hr tablet Take 1 tablet (150 mg) by mouth early in the morning.. 24   Historical Provider, MD   chlorhexidine (Peridex) 0.12 % solution Use 15 mL in the mouth or throat once daily for 2 days. 25  JOSE Rosado-CNP   Descovy 200-25 mg tablet Take 1 tablet by mouth once daily. 25   PLACIDO Alberto   doxycycline (Vibra-Tabs) 100 mg tablet TAKE 2 TABLETS WITHIN 24 TO 72 HOURS AFTER UNPROTECTED SEXUAL ENCOUNTER, NOT TO BE TAKEN DAILY. Take with a large glass of water and do not lie down for 30 minutes after. 4/10/25   PLACIDO Alberto   DULoxetine (Cymbalta) 20 mg DR capsule Take 2 capsules (40 mg) by mouth once daily. Do not crush or chew. 25  Hay Robledo MD   esomeprazole (NexIUM) 40 mg DR capsule Take 1 capsule (40 mg) by mouth once daily in the morning. Take before meals. 25   Hay Robledo MD   finasteride (Proscar) 5 mg tablet TAKE ONE-FOURTH (1/4) TABLET DAILY 25   Hay Robledo MD   lamoTRIgine (LaMICtal) 200 mg tablet Take 2 tablets (400 mg) by mouth once daily. 17   Historical Provider, MD   lurasidone (Latuda) 60 mg tablet 2 tablets " (120 mg) once daily. 10/27/23   Historical Provider, MD   psyllium (Metamucil) 0.4 gram capsule Take 2 capsules by mouth 4 times a day.    Historical Provider, MD   tadalafil (Cialis) 10 mg tablet Take 1 tablet (10 mg) by mouth if needed for erectile dysfunction. 10/28/24 7/7/25  Celio Pierce MD MPH   valACYclovir (Valtrex) 1 gram tablet TAKE 2 TABLETS EVERY 12 HOURS FOR 1 DAY AS NEEDED AT EARLY ONSET OF COLD SORES 3/26/25   Hay Robledo MD        [facility] ROS     DASI Risk Score      Flowsheet Row Pre-Admission Testing from 12/31/2024 in [facility]   Can you take care of yourself (eat, dress, bathe, or use toilet)?  2.75 filed at 12/31/2024 0932   Can you walk indoors, such as around your house? 1.75 filed at 12/31/2024 0932   Can you walk a block or two on level ground?  2.75 filed at 12/31/2024 0932   Can you climb a flight of stairs or walk up a hill? 5.5 filed at 12/31/2024 0932   Can you run a short distance? 8 filed at 12/31/2024 0932   Can you do light work around the house like dusting or washing dishes? 2.7 filed at 12/31/2024 0932   Can you do moderate work around the house like vacuuming, sweeping floors or carrying groceries? 3.5 filed at 12/31/2024 0932   Can you do heavy work around the house like scrubbing floors or lifting and moving heavy furniture?  8 filed at 12/31/2024 0932   Can you do yard work like raking leaves, weeding or pushing a mower? 4.5 filed at 12/31/2024 0932   Can you have sexual relations? 5.25 filed at 12/31/2024 0932   Can you participate in moderate recreational activities like golf, bowling, dancing, doubles tennis or throwing a baseball or football? 6 filed at 12/31/2024 0932   Can you participate in strenous sports like swimming, singles tennis, football, basketball, or skiing? 0 filed at 12/31/2024 0932   DASI SCORE 50.7 filed at 12/31/2024 0932   METS Score (Will be calculated only when all the questions are answered) 9 filed at 12/31/2024 0932           Caprini DVT Assessment      Flowsheet Row Pre-Admission Testing from 12/31/2024 in [facility]   DVT Score (IF A SCORE IS NOT CALCULATING, MUST SELECT A BMI TO COMPLETE) 3 filed at 12/31/2024 0931   Surgical Factors Major surgery planned, including arthroscopic and laproscopic (1-2 hours) filed at 12/31/2024 0931   BMI (BMI MUST BE CHOSEN) 30 or less filed at 12/31/2024 0931          Modified Frailty Index    No data to display       [id number] Stroke Risk Points  Current as of about an hour ago        N/A 0 to 9 Points:      Last Change: N/A          The [id number] risk score (Lip WILBERTO, et al. 2009. © 2010 [employer]) quantifies the risk of stroke for a patient with atrial fibrillation. For patients without atrial fibrillation or under the age of 18 this score appears as N/A. Higher score values generally indicate higher risk of stroke.        This score is not applicable to this patient. Components are not calculated.          Revised Cardiac Risk Index      Flowsheet Row Pre-Admission Testing from 12/31/2024 in [facility]   High-Risk Surgery (Intraperitoneal, Intrathoracic,Suprainguinal vascular) 0 filed at 12/31/2024 0932   History of ischemic heart disease (History of MI, History of positive exercuse test, Current chest paint considered due to myocardial ischemia, Use of nitrate therapy, ECG with pathological Q Waves) 0 filed at 12/31/2024 0932   History of congestive heart failure (pulmonary edemia, bilateral rales or S3 gallop, Paroxysmal nocturnal dyspnea, CXR showing pulmonary vascular redistribution) 0 filed at 12/31/2024 0932   History of cerebrovascular disease (Prior TIA or stroke) 0 filed at 12/31/2024 0932   Pre-operative insulin treatment 0 filed at 12/31/2024 0932   Pre-operative creatinine>2 mg/dl 0 filed at 12/31/2024 0932   Revised Cardiac Risk Calculator 0 filed at 12/31/2024 0932          Apfel Simplified Score    No data to  display       Risk Analysis Index Results This Encounter    No data found in the last 10 encounters.       Stop Bang Score      Flowsheet Row Admission (Discharged) from 1/30/2025 in [facility] OR with Magdi Mejia MD Pre-Admission Testing from 12/31/2024 in [facility]   Do you snore loudly? 1  [Hx MONTEZ lost weight since] filed at 01/30/2025 1040 0 filed at 12/31/2024 0840   Do you often feel tired or fatigued after your sleep? 1 filed at 01/30/2025 1040 0 filed at 12/31/2024 0840   Has anyone ever observed you stop breathing in your sleep? 1 filed at 01/30/2025 1040 1 filed at 12/31/2024 0840   Do you have or are you being treated for high blood pressure? 1 filed at 01/30/2025 1040 1 filed at 12/31/2024 0840   Recent BMI (Calculated) 24.8 filed at 01/30/2025 1040 24.8 filed at 12/31/2024 0840   Is BMI greater than 35 kg/m2? 0=No filed at 01/30/2025 1040 0=No filed at 12/31/2024 0840   Age older than 50 years old? 0=No filed at 01/30/2025 1040 0=No filed at 12/31/2024 0840   Is your neck circumference greater than 17 inches (Male) or 16 inches (Female)? 1 filed at 01/30/2025 1040 0 filed at 12/31/2024 0840   Gender - Male 1=Yes filed at 01/30/2025 1040 1=Yes filed at 12/31/2024 0840   STOP-BANG Total Score 6 filed at 01/30/2025 1040 3 filed at 12/31/2024 0840          Prodigy: High Risk  Total Score: 8              Prodigy Gender Score          ARISCAT Score for Postoperative Pulmonary Complications      Flowsheet Row Pre-Admission Testing from 12/31/2024 in [facility]   Age Calculated Score 0 filed at 12/31/2024 0932   Preoperative SpO2 0 filed at 12/31/2024 0932   Respiratory infection in the last month Either upper or lower (i.e., URI, bronchitis, pneumonia), with fever and antibiotic treatment 0 filed at 12/31/2024 0932   Preoperative anemia (Hgb less than 10 g/dl) 0 filed at 12/31/2024 0932   Surgical incision  0 filed at 12/31/2024 0932   Duration of  surgery  0 filed at 12/31/2024 0932   Emergency Procedure  0 filed at 12/31/2024 0932   ARISCAT Total Score  0 filed at 12/31/2024 0932          Praneeth Perioperative Risk for Myocardial Infarction or Cardiac Arrest (JACOB)      Flowsheet Row Pre-Admission Testing from 12/31/2024 in [facility]   Calculated Age Score 0.74 filed at 12/31/2024 0932   Functional Status  0 filed at 12/31/2024 0932   ASA Class  -3.29 filed at 12/31/2024 0932   Creatinine 0 filed at 12/31/2024 0932   Type of Procedure  0.80 filed at 12/31/2024 0932   JACOB Total Score  -7 filed at 12/31/2024 0932   JACOB % 0.09 filed at 12/31/2024 0932            Nurse Plan of Action: After Visit Summary (AVS) reviewed and patient verbalized good understanding of medications and NPO instructions.  Pre-op infection prevention measures:  CHG showers and mouthwash reviewed, understanding voiced.  CHG soap given and patient verbalized need to pick CHG mouthwash at their preferred local pharmacy.   After Visit Summary (AVS) reviewed and patient verbalized good understanding of medications and NPO instructions.                   [1]   Past Medical History:  Diagnosis Date    Anxiety     Bipolar disorder     Depression     GERD (gastroesophageal reflux disease)     Hyperlipidemia     OAB (overactive bladder)    [2]   Past Surgical History:  Procedure Laterality Date    COLONOSCOPY      SHOULDER ARTHROSCOPY W/ LABRAL REPAIR Left 01/2025    TONSILLECTOMY      Tonsillectomy    UPPER GASTROINTESTINAL ENDOSCOPY     [3]   Family History  Problem Relation Name Age of Onset    Breast cancer Mother      Cancer Father          appendix    Lupus Sister      Heart disease Father's Sister afib     Hyperlipidemia Other      Breast cancer Other      Diabetes Other      Neuropathy Other      Depression Other      Alcohol abuse Other      Colon cancer Other      Autism spectrum disorder Other     [4]   Allergies  Allergen Reactions    Midazolam Hallucinations     Amoxicillin Rash    Aripiprazole Rash

## 2025-07-07 NOTE — CPM/PAT H&P
"CP[facility]/PAT Evaluation       Name: Diony Marte (Diony Marte \"Leandro\")  /Age: 1985/39 y.o.     SURGEON :DR JOSE DOUGLAS   Surgery, Date, and Length:  Stage 1 & 2 Insertion Continence Control Stimulator Sacral Lead with Neurostimulator     HPI:  This a 39  y.o. male who presents for presurgical evaluation for for above mentioned procedure  . Pt reports a history of over active bladder .After discussion of the risks and benefits with  the patient elects to proceed with the planned procedure.       Past Medical History:   Diagnosis Date    Anxiety     Bipolar disorder     Depression     GERD (gastroesophageal reflux disease)     Hyperlipidemia     OAB (overactive bladder)        Past Surgical History:   Procedure Laterality Date    COLONOSCOPY      SHOULDER ARTHROSCOPY W/ LABRAL REPAIR Left 2025    TONSILLECTOMY      Tonsillectomy    UPPER GASTROINTESTINAL ENDOSCOPY     Anesthesia History  Pt denies any past history of anesthetic complications such as PONV, awareness, prolonged sedation, dental damage, aspiration, cardiac arrest, difficult intubation, difficult I.V. access or unexpected hospital admissions.  NO malignant hyperthermia and or pseudo cholinesterase deficiency.    The patient IS NOT  a Buddhism and WILL accept blood and blood products if medically indicated.   No history of blood transfusions .Type and screen not sent.     Social History  Social History     Substance and Sexual Activity   Drug Use Yes    Frequency: 7.0 times per week    Types: Marijuana      Social History     Substance and Sexual Activity   Alcohol Use Yes    Alcohol/week: 2.0 - 4.0 standard drinks of alcohol    Types: 2 - 4 Standard drinks or equivalent per week      Social History     Tobacco Use   Smoking Status Never   Smokeless Tobacco Never            Family History   Problem Relation Name Age of Onset    Breast cancer Mother      Cancer Father          appendix    Lupus Sister      Heart disease " Father's Sister afib     Hyperlipidemia Other      Breast cancer Other      Diabetes Other      Neuropathy Other      Depression Other      Alcohol abuse Other      Colon cancer Other      Autism spectrum disorder Other         Allergies   Allergen Reactions    Midazolam Hallucinations    Amoxicillin Rash    Aripiprazole Rash       Prior to Admission medications    Medication Sig Start Date End Date Taking? Authorizing Provider   ALPRAZolam (Xanax) 0.5 mg tablet Take 1 tablet (0.5 mg) by mouth once daily at bedtime.   Yes Historical Provider, MD   aspirin-acetaminophen-caffeine (Excedrin Migraine) 250-250-65 mg tablet Take 1 tablet by mouth every 8 hours if needed for headaches.   Yes Historical Provider, MD   atorvastatin (Lipitor) 40 mg tablet Take 1 tablet (40 mg) by mouth once daily at bedtime. 5/9/25  Yes Hay Robledo MD   buPROPion XL (Wellbutrin XL) 150 mg 24 hr tablet Take 1 tablet (150 mg) by mouth early in the morning.. 6/6/24  Yes Historical Provider, MD   Descovy 200-25 mg tablet Take 1 tablet by mouth once daily. 6/11/25  Yes PLACIDO Alberto   doxycycline (Vibra-Tabs) 100 mg tablet TAKE 2 TABLETS WITHIN 24 TO 72 HOURS AFTER UNPROTECTED SEXUAL ENCOUNTER, NOT TO BE TAKEN DAILY. Take with a large glass of water and do not lie down for 30 minutes after. 4/10/25  Yes PLACIDO Alberto   DULoxetine (Cymbalta) 20 mg DR capsule Take 2 capsules (40 mg) by mouth once daily. Do not crush or chew. 5/9/25 5/9/26 Yes Hay Robledo MD   esomeprazole (NexIUM) 40 mg DR capsule Take 1 capsule (40 mg) by mouth once daily in the morning. Take before meals. 5/9/25  Yes Hay Robledo MD   finasteride (Proscar) 5 mg tablet TAKE ONE-FOURTH (1/4) TABLET DAILY 5/9/25  Yes Hay Robledo MD   lamoTRIgine (LaMICtal) 200 mg tablet Take 2 tablets (400 mg) by mouth once daily. 1/18/17  Yes Historical Provider, MD   lurasidone (Latuda) 60 mg tablet 2 tablets (120 mg) once daily. 10/27/23  Yes  Historical Provider, MD   psyllium (Metamucil) 0.4 gram capsule Take 2 capsules by mouth 4 times a day.   Yes Historical Provider, MD   tadalafil (Cialis) 10 mg tablet Take 1 tablet (10 mg) by mouth if needed for erectile dysfunction. 10/28/24 7/7/25 Yes Celio Pierce MD MPH   valACYclovir (Valtrex) 1 gram tablet TAKE 2 TABLETS EVERY 12 HOURS FOR 1 DAY AS NEEDED AT EARLY ONSET OF COLD SORES 3/26/25  Yes Hay Robledo MD   chlorhexidine (Peridex) 0.12 % solution Use 15 mL in the mouth or throat once daily for 2 days. 7/7/25 7/9/25  JOSE Rosado-CNP        PAT ROS:   Constitutional:   neg    Neuro/Psych:   Eyes:   Ears:   Nose:   neg    Mouth:   neg    Throat:   neg    Neck:   neg    Cardio:   neg    Respiratory:   neg    Endocrine:   GI:   neg    :   neg    Musculoskeletal:    arthralgias (LT SHOULDER)  Hematologic:    bruises/bleeds easily  Skin:  neg        Physical Exam  Vitals reviewed.   Constitutional:       Appearance: Normal appearance.   HENT:      Head: Normocephalic.      Mouth/Throat:      Mouth: Mucous membranes are moist.   Eyes:      Extraocular Movements: Extraocular movements intact.      Pupils: Pupils are equal, round, and reactive to light.   Cardiovascular:      Rate and Rhythm: Normal rate and regular rhythm.      Pulses: Normal pulses.      Heart sounds: Normal heart sounds.   Pulmonary:      Effort: Pulmonary effort is normal.      Breath sounds: Normal breath sounds.   Musculoskeletal:         General: Tenderness (left shoulder) present. Normal range of motion.      Cervical back: Normal range of motion.   Skin:     General: Skin is warm and dry.   Neurological:      Mental Status: He is alert and oriented to person, place, and time.   Psychiatric:         Behavior: Behavior normal.          PAT AIRWAY:   Airway:     Mallampati::  II  normal        Testing/Diagnostic:   UA =NEGATIVE     Lab Results   Component Value Date    WBC 5.1 07/07/2025    HGB 13.2 (L)  "07/07/2025    HCT 39.5 (L) 07/07/2025    MCV 94 07/07/2025     07/07/2025     Results from last 7 days   Lab Units 07/07/25  1154   SODIUM mmol/L 138   POTASSIUM mmol/L 4.3   CHLORIDE mmol/L 105   CO2 mmol/L 26   BUN mg/dL 16   CREATININE mg/dL 1.12   CALCIUM mg/dL 9.5   PROTEIN TOTAL g/dL 7.3   BILIRUBIN TOTAL mg/dL 0.4   ALK PHOS U/L 44   ALT U/L 26   AST U/L 17   GLUCOSE mg/dL 91     Mrsa pending  Patient Specialist/PCP:   /70   Pulse 59   Temp 36.6 °C (97.8 °F) (Temporal)   Resp 18   Ht 1.924 m (6' 3.75\")   Wt 88 kg (194 lb 0.1 oz)   SpO2 98%   BMI 23.77 kg/m²       ASSESSMENT/PLAN    Patient is a 39year-old  scheduled for Stage 1 & 2 Insertion Continence Control Stimulator Sacral Lead with Neurostimulator  with Dr. LOCKWOOD   on  7/9/25 .  CARDIOVASCULAR:  RCRI score / Risk: The patients score is 0 based on history . Per ACC/AHA guidelines this places him  at  3.9% risk for MACE undergoing a intermediate  risk procedure . The patient has the following risk factors:  denies   Functional Capacity: The patients exercise tolerance is  4  METS. This is based on the patient's. Patient denies  active cardiac symptoms or anginal equivalents .      PULMONARY:  The patient has the following factors that place them at increased risk of perioperative pulmonary complications; GERD/greater than 2.5 hour procedure.  Postoperatively the patient would benefit from early pulmonary toilet/incentive spirometry q 1-2 hours while awake/pulse oximetry/cautious use of respiratory depressant medications such as opioids/elevate the HOB/oral hygiene.      DVT:  CAPRINI SCORE=4  The patient has the following factors that increase his  Risk for thrombus formation ; Virchow's triad ge 39, bmi 23, , Surgical procedure >2 hrs  procedure .    Recommendations: DVT prophylaxis  per Dr. Lockwood  protocol . SCD's, BERT's, and early ambulation are recommended. Heparin or LMWH is recommended for the very high risk .      Risk assessment " complete.  Patient is scheduled for  Intermediate  surgical risk procedure.  Patient is considered an acceptable  risk to proceed with the planned procedure.      Preoperative medication instructions were provided and reviewed with the patient.  Any additional testing or evaluation was explained to the patient.  Nothing by mouth instructions were discussed and patient's questions were answered prior to conclusion to this encounter.  Patient verbalized understanding of preoperative instructions given in preadmission testing; discharge instructions available in EMR.

## 2025-07-09 ENCOUNTER — APPOINTMENT (OUTPATIENT)
Dept: RADIOLOGY | Facility: HOSPITAL | Age: 40
End: 2025-07-09
Payer: COMMERCIAL

## 2025-07-09 ENCOUNTER — ANESTHESIA EVENT (OUTPATIENT)
Dept: OPERATING ROOM | Facility: HOSPITAL | Age: 40
End: 2025-07-09
Payer: COMMERCIAL

## 2025-07-09 ENCOUNTER — HOSPITAL ENCOUNTER (OUTPATIENT)
Facility: HOSPITAL | Age: 40
Setting detail: OUTPATIENT SURGERY
Discharge: HOME | End: 2025-07-09
Attending: UROLOGY | Admitting: UROLOGY
Payer: COMMERCIAL

## 2025-07-09 ENCOUNTER — APPOINTMENT (OUTPATIENT)
Dept: PHYSICAL THERAPY | Facility: CLINIC | Age: 40
End: 2025-07-09
Payer: COMMERCIAL

## 2025-07-09 ENCOUNTER — ANESTHESIA (OUTPATIENT)
Dept: OPERATING ROOM | Facility: HOSPITAL | Age: 40
End: 2025-07-09
Payer: COMMERCIAL

## 2025-07-09 ENCOUNTER — TELEPHONE (OUTPATIENT)
Dept: UROLOGY | Facility: HOSPITAL | Age: 40
End: 2025-07-09

## 2025-07-09 VITALS
SYSTOLIC BLOOD PRESSURE: 120 MMHG | HEART RATE: 67 BPM | RESPIRATION RATE: 15 BRPM | TEMPERATURE: 98.2 F | DIASTOLIC BLOOD PRESSURE: 62 MMHG | OXYGEN SATURATION: 98 %

## 2025-07-09 DIAGNOSIS — R35.0 URINARY FREQUENCY: Primary | ICD-10-CM

## 2025-07-09 PROBLEM — O24.419 GESTATIONAL DIABETES: Status: ACTIVE | Noted: 2025-07-09

## 2025-07-09 PROBLEM — O24.419 GESTATIONAL DIABETES: Status: RESOLVED | Noted: 2025-07-09 | Resolved: 2025-07-09

## 2025-07-09 LAB — STAPHYLOCOCCUS SPEC CULT: NORMAL

## 2025-07-09 PROCEDURE — 2780000003 HC OR 278 NO HCPCS: Performed by: UROLOGY

## 2025-07-09 PROCEDURE — 7100000009 HC PHASE TWO TIME - INITIAL BASE CHARGE: Performed by: UROLOGY

## 2025-07-09 PROCEDURE — 2500000001 HC RX 250 WO HCPCS SELF ADMINISTERED DRUGS (ALT 637 FOR MEDICARE OP): Performed by: ANESTHESIOLOGY

## 2025-07-09 PROCEDURE — C1778 LEAD, NEUROSTIMULATOR: HCPCS | Performed by: UROLOGY

## 2025-07-09 PROCEDURE — C1767 GENERATOR, NEURO NON-RECHARG: HCPCS | Performed by: UROLOGY

## 2025-07-09 PROCEDURE — 3600000009 HC OR TIME - EACH INCREMENTAL 1 MINUTE - PROCEDURE LEVEL FOUR: Performed by: UROLOGY

## 2025-07-09 PROCEDURE — 3600000004 HC OR TIME - INITIAL BASE CHARGE - PROCEDURE LEVEL FOUR: Performed by: UROLOGY

## 2025-07-09 PROCEDURE — C1894 INTRO/SHEATH, NON-LASER: HCPCS | Performed by: UROLOGY

## 2025-07-09 PROCEDURE — C1787 PATIENT PROGR, NEUROSTIM: HCPCS | Performed by: UROLOGY

## 2025-07-09 PROCEDURE — 2500000004 HC RX 250 GENERAL PHARMACY W/ HCPCS (ALT 636 FOR OP/ED): Performed by: UROLOGY

## 2025-07-09 PROCEDURE — 7100000002 HC RECOVERY ROOM TIME - EACH INCREMENTAL 1 MINUTE: Performed by: UROLOGY

## 2025-07-09 PROCEDURE — 64561 IMPLANT NEUROELECTRODES: CPT | Performed by: UROLOGY

## 2025-07-09 PROCEDURE — 76000 FLUOROSCOPY <1 HR PHYS/QHP: CPT | Mod: 59

## 2025-07-09 PROCEDURE — 3700000002 HC GENERAL ANESTHESIA TIME - EACH INCREMENTAL 1 MINUTE: Performed by: UROLOGY

## 2025-07-09 PROCEDURE — 7100000001 HC RECOVERY ROOM TIME - INITIAL BASE CHARGE: Performed by: UROLOGY

## 2025-07-09 PROCEDURE — 2500000004 HC RX 250 GENERAL PHARMACY W/ HCPCS (ALT 636 FOR OP/ED): Performed by: ANESTHESIOLOGIST ASSISTANT

## 2025-07-09 PROCEDURE — 3700000001 HC GENERAL ANESTHESIA TIME - INITIAL BASE CHARGE: Performed by: UROLOGY

## 2025-07-09 PROCEDURE — 2720000007 HC OR 272 NO HCPCS: Performed by: UROLOGY

## 2025-07-09 PROCEDURE — 64590 INS/RPL PRPH SAC/GSTR NPG/R: CPT | Performed by: UROLOGY

## 2025-07-09 PROCEDURE — 7100000010 HC PHASE TWO TIME - EACH INCREMENTAL 1 MINUTE: Performed by: UROLOGY

## 2025-07-09 DEVICE — IMPLANTABLE DEVICE: Type: IMPLANTABLE DEVICE | Site: BACK | Status: FUNCTIONAL

## 2025-07-09 DEVICE — TINED LEAD KIT
Type: IMPLANTABLE DEVICE | Site: BACK | Status: FUNCTIONAL
Brand: AXONICS

## 2025-07-09 RX ORDER — LABETALOL HYDROCHLORIDE 5 MG/ML
5 INJECTION, SOLUTION INTRAVENOUS ONCE AS NEEDED
Status: DISCONTINUED | OUTPATIENT
Start: 2025-07-09 | End: 2025-07-09 | Stop reason: HOSPADM

## 2025-07-09 RX ORDER — LIDOCAINE HYDROCHLORIDE 20 MG/ML
INJECTION, SOLUTION EPIDURAL; INFILTRATION; INTRACAUDAL; PERINEURAL AS NEEDED
Status: DISCONTINUED | OUTPATIENT
Start: 2025-07-09 | End: 2025-07-09

## 2025-07-09 RX ORDER — ACETAMINOPHEN 325 MG/1
975 TABLET ORAL ONCE
Status: DISCONTINUED | OUTPATIENT
Start: 2025-07-09 | End: 2025-07-09 | Stop reason: HOSPADM

## 2025-07-09 RX ORDER — OXYCODONE HYDROCHLORIDE 5 MG/1
5 TABLET ORAL EVERY 4 HOURS PRN
Status: DISCONTINUED | OUTPATIENT
Start: 2025-07-09 | End: 2025-07-09 | Stop reason: HOSPADM

## 2025-07-09 RX ORDER — SODIUM CHLORIDE, SODIUM LACTATE, POTASSIUM CHLORIDE, CALCIUM CHLORIDE 600; 310; 30; 20 MG/100ML; MG/100ML; MG/100ML; MG/100ML
100 INJECTION, SOLUTION INTRAVENOUS CONTINUOUS
Status: DISCONTINUED | OUTPATIENT
Start: 2025-07-09 | End: 2025-07-09 | Stop reason: HOSPADM

## 2025-07-09 RX ORDER — CHLORHEXIDINE GLUCONATE 40 MG/ML
SOLUTION TOPICAL DAILY PRN
Status: DISCONTINUED | OUTPATIENT
Start: 2025-07-09 | End: 2025-07-09 | Stop reason: HOSPADM

## 2025-07-09 RX ORDER — HYDRALAZINE HYDROCHLORIDE 20 MG/ML
5 INJECTION INTRAMUSCULAR; INTRAVENOUS EVERY 30 MIN PRN
Status: DISCONTINUED | OUTPATIENT
Start: 2025-07-09 | End: 2025-07-09 | Stop reason: HOSPADM

## 2025-07-09 RX ORDER — LIDOCAINE HYDROCHLORIDE 10 MG/ML
0.1 INJECTION, SOLUTION EPIDURAL; INFILTRATION; INTRACAUDAL; PERINEURAL ONCE
Status: DISCONTINUED | OUTPATIENT
Start: 2025-07-09 | End: 2025-07-09 | Stop reason: HOSPADM

## 2025-07-09 RX ORDER — ALBUTEROL SULFATE 0.83 MG/ML
2.5 SOLUTION RESPIRATORY (INHALATION) ONCE AS NEEDED
Status: DISCONTINUED | OUTPATIENT
Start: 2025-07-09 | End: 2025-07-09 | Stop reason: HOSPADM

## 2025-07-09 RX ORDER — LIDOCAINE HYDROCHLORIDE 10 MG/ML
INJECTION, SOLUTION EPIDURAL; INFILTRATION; INTRACAUDAL; PERINEURAL AS NEEDED
Status: DISCONTINUED | OUTPATIENT
Start: 2025-07-09 | End: 2025-07-09 | Stop reason: HOSPADM

## 2025-07-09 RX ORDER — ONDANSETRON HYDROCHLORIDE 2 MG/ML
INJECTION, SOLUTION INTRAVENOUS AS NEEDED
Status: DISCONTINUED | OUTPATIENT
Start: 2025-07-09 | End: 2025-07-09

## 2025-07-09 RX ORDER — OXYCODONE HYDROCHLORIDE 5 MG/1
10 TABLET ORAL EVERY 4 HOURS PRN
Status: DISCONTINUED | OUTPATIENT
Start: 2025-07-09 | End: 2025-07-09 | Stop reason: HOSPADM

## 2025-07-09 RX ORDER — DROPERIDOL 2.5 MG/ML
0.62 INJECTION, SOLUTION INTRAMUSCULAR; INTRAVENOUS ONCE AS NEEDED
Status: DISCONTINUED | OUTPATIENT
Start: 2025-07-09 | End: 2025-07-09 | Stop reason: HOSPADM

## 2025-07-09 RX ORDER — SODIUM CHLORIDE, SODIUM LACTATE, POTASSIUM CHLORIDE, CALCIUM CHLORIDE 600; 310; 30; 20 MG/100ML; MG/100ML; MG/100ML; MG/100ML
INJECTION, SOLUTION INTRAVENOUS CONTINUOUS PRN
Status: DISCONTINUED | OUTPATIENT
Start: 2025-07-09 | End: 2025-07-09

## 2025-07-09 RX ORDER — ONDANSETRON HYDROCHLORIDE 2 MG/ML
4 INJECTION, SOLUTION INTRAVENOUS ONCE AS NEEDED
Status: DISCONTINUED | OUTPATIENT
Start: 2025-07-09 | End: 2025-07-09 | Stop reason: HOSPADM

## 2025-07-09 RX ORDER — MIDAZOLAM HYDROCHLORIDE 1 MG/ML
INJECTION, SOLUTION INTRAMUSCULAR; INTRAVENOUS CONTINUOUS PRN
Status: DISCONTINUED | OUTPATIENT
Start: 2025-07-09 | End: 2025-07-09

## 2025-07-09 RX ORDER — FENTANYL CITRATE 50 UG/ML
INJECTION, SOLUTION INTRAMUSCULAR; INTRAVENOUS AS NEEDED
Status: DISCONTINUED | OUTPATIENT
Start: 2025-07-09 | End: 2025-07-09

## 2025-07-09 RX ORDER — PROPOFOL 10 MG/ML
INJECTION, EMULSION INTRAVENOUS AS NEEDED
Status: DISCONTINUED | OUTPATIENT
Start: 2025-07-09 | End: 2025-07-09

## 2025-07-09 RX ORDER — VANCOMYCIN HYDROCHLORIDE 1 G/200ML
1000 INJECTION, SOLUTION INTRAVENOUS ONCE
Status: COMPLETED | OUTPATIENT
Start: 2025-07-09 | End: 2025-07-09

## 2025-07-09 RX ADMIN — VANCOMYCIN HYDROCHLORIDE 1000 MG: 1 INJECTION, SOLUTION INTRAVENOUS at 06:45

## 2025-07-09 RX ADMIN — OXYCODONE HYDROCHLORIDE 5 MG: 5 TABLET ORAL at 08:29

## 2025-07-09 RX ADMIN — ONDANSETRON 4 MG: 2 INJECTION, SOLUTION INTRAMUSCULAR; INTRAVENOUS at 07:59

## 2025-07-09 RX ADMIN — LIDOCAINE HYDROCHLORIDE 100 MG: 20 INJECTION, SOLUTION EPIDURAL; INFILTRATION; INTRACAUDAL; PERINEURAL at 07:29

## 2025-07-09 RX ADMIN — PROPOFOL 50 MG: 10 INJECTION, EMULSION INTRAVENOUS at 07:28

## 2025-07-09 RX ADMIN — SODIUM CHLORIDE, POTASSIUM CHLORIDE, SODIUM LACTATE AND CALCIUM CHLORIDE: 600; 310; 30; 20 INJECTION, SOLUTION INTRAVENOUS at 07:22

## 2025-07-09 RX ADMIN — FENTANYL CITRATE 25 MCG: 50 INJECTION, SOLUTION INTRAMUSCULAR; INTRAVENOUS at 07:37

## 2025-07-09 RX ADMIN — PROPOFOL 100 MCG/KG/MIN: 10 INJECTION, EMULSION INTRAVENOUS at 07:30

## 2025-07-09 RX ADMIN — FENTANYL CITRATE 50 MCG: 50 INJECTION, SOLUTION INTRAMUSCULAR; INTRAVENOUS at 07:28

## 2025-07-09 RX ADMIN — DEXAMETHASONE SODIUM PHOSPHATE 4 MG: 4 INJECTION, SOLUTION INTRAMUSCULAR; INTRAVENOUS at 07:44

## 2025-07-09 RX ADMIN — FENTANYL CITRATE 25 MCG: 50 INJECTION, SOLUTION INTRAMUSCULAR; INTRAVENOUS at 07:46

## 2025-07-09 ASSESSMENT — PAIN SCALES - GENERAL
PAINLEVEL_OUTOF10: 0 - NO PAIN
PAINLEVEL_OUTOF10: 1
PAINLEVEL_OUTOF10: 2
PAINLEVEL_OUTOF10: 1
PAINLEVEL_OUTOF10: 3

## 2025-07-09 ASSESSMENT — PAIN - FUNCTIONAL ASSESSMENT
PAIN_FUNCTIONAL_ASSESSMENT: 0-10

## 2025-07-09 ASSESSMENT — PAIN DESCRIPTION - LOCATION: LOCATION: BACK

## 2025-07-09 ASSESSMENT — PAIN DESCRIPTION - DESCRIPTORS
DESCRIPTORS: BURNING

## 2025-07-09 ASSESSMENT — COLUMBIA-SUICIDE SEVERITY RATING SCALE - C-SSRS
2. HAVE YOU ACTUALLY HAD ANY THOUGHTS OF KILLING YOURSELF?: NO
6. HAVE YOU EVER DONE ANYTHING, STARTED TO DO ANYTHING, OR PREPARED TO DO ANYTHING TO END YOUR LIFE?: NO
1. IN THE PAST MONTH, HAVE YOU WISHED YOU WERE DEAD OR WISHED YOU COULD GO TO SLEEP AND NOT WAKE UP?: NO

## 2025-07-09 ASSESSMENT — PAIN DESCRIPTION - ORIENTATION: ORIENTATION: LOWER

## 2025-07-09 NOTE — DISCHARGE INSTRUCTIONS
Call Dr. Lockwood for any problems and/or concerns    *No lifting/straining greater than 10 pounds  *You may shower 48 hours after your surgery   *Prevent constipation by using stool softeners and staying hydrated, so that you do not strain against your stitches or have pain from constipation    Call Doctor right away for:    *Fever above 100.4/shaking chills  *A foul smelling discharge    *Persistent nausea/vomiting  *Redness, swelling, or drainage at your incision sites  *Chest pain/shortness of breath-call 911.    - Can take over the counter pain medication as needed., alternate a dose of Acetaminophen (Tylenol) and Ibuprofen (Motrin) every 3 hours.

## 2025-07-09 NOTE — OP NOTE
"Stage 1 & 2 Insertion Continence Control Stimulator Sacral Lead with Neurostimulator Operative Note     Date: 2025  OR Location: [facility] A OR    Name: Diony Marte \"Iglesia", : 1985, Age: 39 y.o., MRN: [id number], Sex: male    Diagnosis  Pre-op Diagnosis      * Urinary frequency [R35.0] Post-op Diagnosis     * Urinary frequency [R35.0]     Procedures  Stage 1 & 2 Insertion Continence Control Stimulator Sacral Lead with Neurostimulator  00479 - AL PRQ IMPLTJ NEUROSTIM ELTRD SACRAL NRVE W/IMAGING    Stage 1 & 2 Insertion Continence Control Stimulator Sacral Lead with Neurostimulator  17207 - AL INS/RPLC PERPH SAC/GSTRC NPG/RCVR PCKT CRTJ&CONN      Surgeons      * Bruce Lockwood - Primary    Resident/Fellow/Other Assistant:  Surgeons and Role:  * No surgeons found with a matching role *    Staff:   Circulator: Clary Geller Person: Arabella    Anesthesia Staff: Anesthesiologist: Diony Gilliam MD  C-AA: CLEMENTE Varela    Procedure Summary  Anesthesia: Anesthesia type not filed in the log.  ASA: ASA status not filed in the log.  Estimated Blood Loss: 10mL  Intra-op Medications: Administrations occurring from 0705 to 0835 on 25:  * No intraprocedure medications in log *           Anesthesia Record               Intraprocedure I/O Totals       None           Specimen: No specimens collected              Drains and/or Catheters: * None in log *    Tourniquet Times:         Implants:     Findings: Excellent motor responses with lead in right S3.  Rechargeable IPG    Indications: Diony Marte \"Iglesia" is an 39 y.o. male who is having surgery for Urinary frequency [R35.0].     The patient was seen in the preoperative area. The risks, benefits, complications, treatment options, non-operative alternatives, expected recovery and outcomes were discussed with the patient. The possibilities of reaction to medication, pulmonary aspiration, injury to surrounding structures, bleeding, recurrent infection, the " need for additional procedures, failure to diagnose a condition, and creating a complication requiring transfusion or operation were discussed with the patient. The patient concurred with the proposed plan, giving informed consent.  The site of surgery was properly noted/marked if necessary per policy. The patient has been actively warmed in preoperative area. Preoperative antibiotics have been ordered and given within 1 hours of incision. Venous thrombosis prophylaxis have been ordered including bilateral sequential compression devices    Procedure Details:   After surgical consent was reviewed with the patient, including risks, benefits and alternatives, the patient expressed desire to proceed with surgery.  The patient was then taken to the operating room and placed in the prone position. Pillows were placed in the lower abdomen and hips to level and flatten the sacrum and allow the toes to dangle freely.  Intravenous Ancef was infused 30 minutes prior to incision for infection prophylaxis. A ground pad was placed on the bottom of the patient's foot and was connected to the Clinician  along with the test stimulation cable (J-hook). Tape was used on the buttocks to expose the anus and allow for observation of the celio response. The patient was then prepped and draped in the usual fashion.   MAC anesthesia was induced. Preoperative fluoroscopy was performed to visualize the sacral anatomy.    Attention was turned to placement of the lead wire using the Appoet System. The level of S3 and the medial border of the sacral foramen were identified bilaterally using fluoroscopy in the AP view. After administration of local anesthesia, a foramen needle was placed in the superior, medial aspect of the S3 foramen such that the needle was parallel to medial border of the foramen. A lateral fluoroscopic image was then obtained to ensure the needle entry point was approximately 1 cm above and parallel to the  fusion plate of S3. The needle was advanced such that the tip of the foramen needle was just at the anterior aspect of the sacrum. The J-hook was then placed on the uninsulated portion of the foramen needle and stimulation applied to obtain the opening threshold amplitude. There was noted to be a positive celio response at <1mA,. Once the ideal location was confirmed, a small incision was made at the foramen needle to accommodate the lead wire and tunneling tool. The directional guide was placed through the foramen needle and the needle removed. The introducer was placed over the directional guide and advanced under live fluoroscopy such that the radiopaque marker was placed half-way through the sacral plate. The dilator was removed along with the directional guide. Using live fluoroscopy, the tined lead with the curved stylet was placed through the introducer until electrode three straddled the anterior surface of the sacrum and ensuring the lead had a gentle downward trajectory. The stimulation clip was then placed on the distal lead and each electrode was tested observing for a motor response. After satisfactory lead positioning was confirmed, the introducer was retracted over the lead under continuous fluoroscopy, deploying the tines into presacral tissue. Stimulation thresholds were established using the least amount of stimulation required to obtain a motor response.     After administration of local anesthetic, a 2.5cm incision was made just lateral to the site of rechargeable battery placement site, to a depth of 2cm. A pocket was created to accommodate the INS using combination of sharp and blunt dissection, maintaining the depth of 2 cm. The tunneling tool was passed from the lead wire insertion site to the lateral pocket. The sharp tip of the tunneling tool was removed and the lead was fed through the sheath, exiting at the pocket site. The sheath was removed. The lead was cleaned and dried and  connected to the SqueezeCMM INS by inserting the lead into the header of the neurostimulator until the white tip was visualized in the strain relief. The single set screw was tightened using the torque wrench    The neurostimulator was placed into the pocket. Any excessive lead was coiled and placed behind the neurostimulator. Impedances were confirmed to be within normal limits.    The incisions were irrigated with antibiotic solution in sterile water and the INS incision was closed with 2-0 Vicryl for the subcutaneous layer, skin was closed with running 4-0 Monocryl skin sutures. Skin glue was applied and covered with dressing.    Instrument, sponge, and needle counts were correct times two.    Bruce Lockwood MD   Evidence of Infection: No   Complications:  None; patient tolerated the procedure well.    Disposition: PACU - hemodynamically stable.  Condition: stable     Additional Details: Will be programmed in PACU, fu in 1m    Attending Attestation: I was present and scrubbed for the entire procedure.    Bruce Lockwood  Phone Number: [phone]

## 2025-07-09 NOTE — TELEPHONE ENCOUNTER
Left pt message with post op appointment information  Gave office number of [phone] to call back

## 2025-07-09 NOTE — ANESTHESIA POSTPROCEDURE EVALUATION
"Patient: Diony Marte \"Leandro\"    Procedure Summary       Date: 07/09/25 Room / Location: [facility] OR 04 / [facility] OR    Anesthesia Start: 0722 Anesthesia Stop: 0814    Procedure: Stage 1 & 2 Insertion Continence Control Stimulator Sacral Lead with Neurostimulator (Back) Diagnosis:       Urinary frequency      (Urinary frequency [R35.0])    Surgeons: Bruce Lockwood MD Responsible Provider: Diony Gilliam MD    Anesthesia Type: general ASA Status: 2            Anesthesia Type: general    Vitals Value Taken Time   /81 07/09/25 08:17   Temp 37.1 °C (98.8 °F) 07/09/25 08:10   Pulse 61 07/09/25 08:17   Resp 13 07/09/25 08:10   SpO2 97 % 07/09/25 08:17   Vitals shown include unfiled device data.    Anesthesia Post Evaluation    Patient location during evaluation: bedside  Patient participation: complete - patient participated  Level of consciousness: awake  Pain management: adequate  Airway patency: patent  Cardiovascular status: acceptable  Respiratory status: acceptable  Hydration status: acceptable  Postoperative Nausea and Vomiting: none        There were no known notable events for this encounter.    "

## 2025-07-09 NOTE — PERIOPERATIVE NURSING NOTE
0810- PHASE I - Patient to PACU bay at this time in stable condition. Bedside monitors applied to patient upon arrival to PACU. Report received from OR Team at bedside. Patient was MAC sedation.     0824- Device RN at bedside setting up patient's incontinence stimulator at this time    0825- Patient tolerating sips of ginger ale and bites of jerson crackers at this time. Denies nausea.     0828- Called patient's  at this time; no answer; left VM updates     0844- Criteria met for patient to discharge from Phase I at this time    0845- PHASE II

## 2025-07-09 NOTE — ANESTHESIA PREPROCEDURE EVALUATION
"Patient: Diony Marte \"Leandro\"    Procedure Information       Date/Time: 07/09/25 0705    Procedure: Stage 1 & 2 Insertion Continence Control Stimulator Sacral Lead with Neurostimulator (Back)    Location: [facility] A OR 04 / [facility] A OR    Surgeons: Bruce Lockwood MD            Relevant Problems   Anesthesia (within normal limits)      Cardiac (within normal limits)      Pulmonary (within normal limits)      Neuro   (+) Anxiety   (+) Bipolar affective disorder (Multi)   (+) Chronic headaches   (+) Cubital tunnel syndrome on left      GI   (+) GERD (gastroesophageal reflux disease)   (+) Peptic ulcer      /Renal (within normal limits)      Liver (within normal limits)      Endocrine (within normal limits)   (+) Gestational diabetes (Resolved)      Hematology   (+) Anemia      Musculoskeletal (within normal limits)      HEENT (within normal limits)      ID (within normal limits)       Clinical information reviewed:   Tobacco  Allergies  Meds   Med Hx  Surg Hx   Fam Hx  Soc Hx        NPO Detail:  NPO/Void Status  Carbohydrate Drink Given Prior to Surgery? : N  Date of Last Liquid: 07/09/25  Time of Last Liquid: 0400  Date of Last Solid: 07/08/25  Time of Last Solid: 2200  Last Intake Type: Clear fluids  Time of Last Void: 0626         Physical Exam    Airway  Mallampati: II  TM distance: >3 FB  Neck ROM: full  Mouth opening: 3 or more finger widths     Cardiovascular   Rhythm: regular  Rate: normal     Dental - normal exam     Pulmonary - normal exam   Abdominal            Anesthesia Plan    History of general anesthesia?: yes  History of complications of general anesthesia?: no    ASA 2     MAC     Anesthetic plan and risks discussed with patient.  Use of blood products discussed with patient who consented to blood products.    Plan discussed with CAA and CRNA.      "

## 2025-07-10 ASSESSMENT — PAIN SCALES - GENERAL: PAINLEVEL_OUTOF10: 0 - NO PAIN

## 2025-07-11 ENCOUNTER — APPOINTMENT (OUTPATIENT)
Dept: ORTHOPEDIC SURGERY | Facility: HOSPITAL | Age: 40
End: 2025-07-11
Payer: COMMERCIAL

## 2025-07-16 ENCOUNTER — APPOINTMENT (OUTPATIENT)
Dept: ORTHOPEDIC SURGERY | Facility: HOSPITAL | Age: 40
End: 2025-07-16
Payer: COMMERCIAL

## 2025-07-16 ENCOUNTER — APPOINTMENT (OUTPATIENT)
Dept: PHYSICAL THERAPY | Facility: CLINIC | Age: 40
End: 2025-07-16
Payer: COMMERCIAL

## 2025-07-16 DIAGNOSIS — S43.432D LABRAL TEAR OF SHOULDER, LEFT, SUBSEQUENT ENCOUNTER: Primary | ICD-10-CM

## 2025-07-16 PROCEDURE — 2500000004 HC RX 250 GENERAL PHARMACY W/ HCPCS (ALT 636 FOR OP/ED): Performed by: ORTHOPAEDIC SURGERY

## 2025-07-16 PROCEDURE — 99212 OFFICE O/P EST SF 10 MIN: CPT | Mod: 25 | Performed by: ORTHOPAEDIC SURGERY

## 2025-07-16 PROCEDURE — 99213 OFFICE O/P EST LOW 20 MIN: CPT | Performed by: ORTHOPAEDIC SURGERY

## 2025-07-16 PROCEDURE — 20610 DRAIN/INJ JOINT/BURSA W/O US: CPT | Mod: LT | Performed by: ORTHOPAEDIC SURGERY

## 2025-07-16 RX ORDER — LIDOCAINE HYDROCHLORIDE 10 MG/ML
4 INJECTION, SOLUTION INFILTRATION; PERINEURAL
Status: COMPLETED | OUTPATIENT
Start: 2025-07-16 | End: 2025-07-16

## 2025-07-16 RX ORDER — TRIAMCINOLONE ACETONIDE 40 MG/ML
40 INJECTION, SUSPENSION INTRA-ARTICULAR; INTRAMUSCULAR
Status: COMPLETED | OUTPATIENT
Start: 2025-07-16 | End: 2025-07-16

## 2025-07-16 RX ADMIN — TRIAMCINOLONE ACETONIDE 40 MG: 400 INJECTION, SUSPENSION INTRA-ARTICULAR; INTRAMUSCULAR at 08:27

## 2025-07-16 RX ADMIN — LIDOCAINE HYDROCHLORIDE 4 ML: 10 INJECTION, SOLUTION INFILTRATION; PERINEURAL at 08:27

## 2025-07-16 ASSESSMENT — PAIN SCALES - GENERAL: PAINLEVEL_OUTOF10: 0 - NO PAIN

## 2025-07-16 ASSESSMENT — PAIN - FUNCTIONAL ASSESSMENT: PAIN_FUNCTIONAL_ASSESSMENT: 0-10

## 2025-07-16 NOTE — PROGRESS NOTES
Patient is here for reevaluation of his left shoulder status post SLAP repair.  He is doing better and has no pain at rest and less pain with working out but occasionally becomes aggravated the pain is consistently anterior.  We discussed the potential merit of injection patient elected to have an injection and intra-articular injection was performed on the posterior approach and tolerated very well.  The patient has been asked to call next week to report the results.    This was dictated using voice recognition software and not corrected for grammatical or spelling errors.

## 2025-07-16 NOTE — PROGRESS NOTES
L Inj/Asp: L glenohumeral on 7/16/2025 8:27 AM  Indications: pain  Details: 22 G needle, posterior approach  Medications: 40 mg triamcinolone acetonide 40 mg/mL; 4 mL lidocaine 10 mg/mL (1 %)  Outcome: tolerated well, no immediate complications  Procedure, treatment alternatives, risks and benefits explained, specific risks discussed. Consent was given by the patient. Immediately prior to procedure a time out was called to verify the correct patient, procedure, equipment, support staff and site/side marked as required.

## 2025-07-23 ENCOUNTER — APPOINTMENT (OUTPATIENT)
Dept: PHYSICAL THERAPY | Facility: CLINIC | Age: 40
End: 2025-07-23
Payer: COMMERCIAL

## 2025-07-30 ENCOUNTER — APPOINTMENT (OUTPATIENT)
Dept: PHYSICAL THERAPY | Facility: CLINIC | Age: 40
End: 2025-07-30
Payer: COMMERCIAL

## 2025-08-05 DIAGNOSIS — E78.00 ELEVATED LDL CHOLESTEROL LEVEL: ICD-10-CM

## 2025-08-05 RX ORDER — ATORVASTATIN CALCIUM 40 MG/1
40 TABLET, FILM COATED ORAL NIGHTLY
Qty: 90 TABLET | Refills: 0 | Status: SHIPPED | OUTPATIENT
Start: 2025-08-05

## 2025-08-07 ENCOUNTER — OFFICE VISIT (OUTPATIENT)
Dept: UROLOGY | Facility: HOSPITAL | Age: 40
End: 2025-08-07
Payer: COMMERCIAL

## 2025-08-07 DIAGNOSIS — R35.0 URINARY FREQUENCY: Primary | ICD-10-CM

## 2025-08-07 PROCEDURE — 99212 OFFICE O/P EST SF 10 MIN: CPT | Performed by: UROLOGY

## 2025-08-07 PROCEDURE — 99213 OFFICE O/P EST LOW 20 MIN: CPT | Performed by: UROLOGY

## 2025-08-07 NOTE — PROGRESS NOTES
NAME:Diony Marte  DATE: 8/7/2025    FUV               Subjective:     HPI:  39 y.o. male presenting for fuv     Pt had axonics PNE on 5/2/25 5/9/25 - Reports >50% improvement in symptoms.  Voiding less during day (12-13 vs 25x).  Only getting up once a night vs 4-6x.      7/9/25 - Had full implant    8/7/25 - Doing great.  Getting up twice a night.  Voiding ever 2-3hrs.      Past Medical History:   Diagnosis Date    Anxiety     Bipolar disorder     Depression     GERD (gastroesophageal reflux disease)     Hyperlipidemia     OAB (overactive bladder)      Past Surgical History:   Procedure Laterality Date    COLONOSCOPY      SHOULDER ARTHROSCOPY W/ LABRAL REPAIR Left 01/2025    TONSILLECTOMY      Tonsillectomy    UPPER GASTROINTESTINAL ENDOSCOPY       Social History     Tobacco Use    Smoking status: Never    Smokeless tobacco: Never   Substance Use Topics    Alcohol use: Yes     Alcohol/week: 2.0 - 4.0 standard drinks of alcohol     Types: 2 - 4 Standard drinks or equivalent per week     Family History   Problem Relation Name Age of Onset    Breast cancer Mother      Cancer Father          appendix    Lupus Sister      Heart disease Father's Sister afib     Hyperlipidemia Other      Breast cancer Other      Diabetes Other      Neuropathy Other      Depression Other      Alcohol abuse Other      Colon cancer Other      Autism spectrum disorder Other       Current Outpatient Medications on File Prior to Visit   Medication Sig Dispense Refill    ALPRAZolam (Xanax) 0.5 mg tablet Take 1 tablet (0.5 mg) by mouth once daily at bedtime.      aspirin-acetaminophen-caffeine (Excedrin Migraine) 250-250-65 mg tablet Take 1 tablet by mouth every 8 hours if needed for headaches.      atorvastatin (Lipitor) 40 mg tablet Take 1 tablet (40 mg) by mouth once daily at bedtime. 90 tablet 0    buPROPion XL (Wellbutrin XL) 150 mg 24 hr tablet Take 1 tablet (150 mg) by mouth early in the morning..      Descovy 200-25 mg tablet  Take 1 tablet by mouth once daily. 30 tablet 2    doxycycline (Vibra-Tabs) 100 mg tablet TAKE 2 TABLETS WITHIN 24 TO 72 HOURS AFTER UNPROTECTED SEXUAL ENCOUNTER, NOT TO BE TAKEN DAILY. Take with a large glass of water and do not lie down for 30 minutes after. 30 tablet 0    DULoxetine (Cymbalta) 20 mg DR capsule Take 2 capsules (40 mg) by mouth once daily. Do not crush or chew. 180 capsule 1    esomeprazole (NexIUM) 40 mg DR capsule Take 1 capsule (40 mg) by mouth once daily in the morning. Take before meals. 90 capsule 1    finasteride (Proscar) 5 mg tablet TAKE ONE-FOURTH (1/4) TABLET DAILY 23 tablet 0    lamoTRIgine (LaMICtal) 200 mg tablet Take 2 tablets (400 mg) by mouth once daily.      lurasidone (Latuda) 60 mg tablet 2 tablets (120 mg) once daily.      psyllium (Metamucil) 0.4 gram capsule Take 2 capsules by mouth 4 times a day.      tadalafil (Cialis) 10 mg tablet Take 1 tablet (10 mg) by mouth if needed for erectile dysfunction. 15 tablet 3    valACYclovir (Valtrex) 1 gram tablet TAKE 2 TABLETS EVERY 12 HOURS FOR 1 DAY AS NEEDED AT EARLY ONSET OF COLD SORES 40 tablet 0    [DISCONTINUED] atorvastatin (Lipitor) 40 mg tablet Take 1 tablet (40 mg) by mouth once daily at bedtime. 90 tablet 1     Current Facility-Administered Medications on File Prior to Visit   Medication Dose Route Frequency Provider Last Rate Last Admin    ciprofloxacin (Cipro) tablet 500 mg  500 mg oral Once Celio Pierce MD MPH         Diony is allergic to midazolam, amoxicillin, and aripiprazole.     Review of Systems    14 point ROS reviewed and discussed with the patient. Pertinent positives/negatives discussed in the History of Present Illness (HPI).      FH:  Prostate CA: Yes, grandfather  Bladder CA: No  Kidney CA: No  Stones: Yes    Social History  Occupation: IT consulting  Tob: No  Etoh: Yes      Objective:     Physical Exam   1. Constitutional: NAD, Well-developed, Well-nourished  2. Respiratory: Unlabored breathing, no  audible wheezes, no use of accessory muscles   3. Cardiovascular: No JVD, extremities perfused, no edema  4. Abdomen: Soft, non-tender, non-distended, no masses or hernia.  No CVA tenderness.    5. Skin: no visible lesions, plaque, rashes, jaundice  6. Neuro: Gait normal, no focal neurologic deficit  7. Psychiatric: Mood and affect normal and appropriate, alert and oriented  *. Back - incision well healed    Labs  Lab Results   Component Value Date    GFRMALE 88 08/08/2023     Lab Results   Component Value Date    CREATININE 1.12 07/07/2025     Lab Results   Component Value Date    CHOL 136 05/13/2025     Lab Results   Component Value Date    HDL 44 05/13/2025     Lab Results   Component Value Date    CHHDL 3.1 05/13/2025     Lab Results   Component Value Date    LDLF 73 08/08/2023     Lab Results   Component Value Date    VLDL 14 09/20/2024     Lab Results   Component Value Date    TRIG 52 05/13/2025     Lab Results   Component Value Date    HGBA1C 5.0 07/07/2025     Lab Results   Component Value Date    HCT 39.5 (L) 07/07/2025       Assessment/Plan:   Diony Marte presents with     1. Urinary frequency      Doing great s/p Axonics NM  Cont current setting    FU in 6m, sooner if needed    OAB    We discussed the pathophysiology of overactive bladder. We discussed possible treatment options including doing conservative voiding techniques, medications, and surgical options.. He was counseled regarding bladder retraining, diet choices, and fluid restriction.  A handout about this given as well as a voiding diary    Patient was informed that first line treatment is behavioral therapy.  This includes:   -Fluid balancing and sometimes restriction   -Reducing caffeine or other bladder stimulants   -Bladder retraining  -Delayed voiding to help defer the overwhelming urge    Patient was informed that second line treatment includes medications. We discussed Mirabegron and that the side effects include possible  increase in blood pressure in a small minority of patients, however insurance does not always cover this. We also discussed anticholinergic medications which can have the side effects of dry eyes, dry mouth, constipation and rarely cognitive changes.    I mentioned 3rd line management options of neuromodulation and Botox injections as well    Patient continues to have bothersome urinary symptoms despite conservative measures and had tried at least 2 medications (anti-cholinergic or beta-agonist).  We discussed OAB pathway as above.  We went into detail about Axonics neuromodulation.  We discussed that this is a long-term treatment that helps to restore communication between the brain, bladder and bowel.    We discussed the peripheral nerve evaluation (PNE).  That it would be done in the office setting with local anesthetic.  Two tiny wires, or leads, would be inserted in the upper buttock.  This would be guided by bony landmarks and patients sensory responses.  Once we confirmed the correct location the leads would be connected to an external device and secured to the patients back.  They would return home with no major restrictions, just no showering or strenuous activity.  They will keep a voiding diary to monitor their responses. They will follow up in office the week after to have the leads removed and review the voiding diary.  A successful trial is deemed if >50% response.    If we decide to move forward with permanent implant that will be done in the operating room with light sedation.  A permanent lead would be placed under fluoroscopic guidance to confirm correct positioning.  This is then connected to an internal pulse generator which can last up to 20 years.    Risks of change in sensation, pain, irritation, bleeding, movement of the lead and implant infection discussed.    Patient in agreement and wants to proceed.

## 2025-08-09 DIAGNOSIS — E78.00 ELEVATED LDL CHOLESTEROL LEVEL: ICD-10-CM

## 2025-08-09 DIAGNOSIS — Z13.89 SCREENING FOR BLOOD OR PROTEIN IN URINE: ICD-10-CM

## 2025-10-10 ENCOUNTER — APPOINTMENT (OUTPATIENT)
Dept: PRIMARY CARE | Facility: CLINIC | Age: 40
End: 2025-10-10
Payer: COMMERCIAL

## (undated) DEVICE — GLOVE, SURGICAL, PROTEXIS PI , 8.0, PF, LF

## (undated) DEVICE — Device

## (undated) DEVICE — TOWEL, SURGICAL, NEURO, O/R, 16 X 26, BLUE, STERILE

## (undated) DEVICE — DRILL BIT, Y-KNOT 1.3MM

## (undated) DEVICE — DRAPE, C-ARM, W/12 IN COVER, LI XTRAY TUBE

## (undated) DEVICE — NEEDLE, HYPODERMIC, 25 G X 1.5 IN, A BEVEL, STERILE

## (undated) DEVICE — GLOVE, SURGICAL, PROTEXIS PI , 7.5, PF, LF

## (undated) DEVICE — DRESSING, ISLAND, TELFA, 4 X 5 IN

## (undated) DEVICE — BLANKET, LOWER BODY, VHA PLUS, ADULT

## (undated) DEVICE — ADHESIVE, SKIN, DERMABOND ADVANCED, 15CM, PEN-STYLE

## (undated) DEVICE — DRAPE, SHEET, 17 X 23 IN

## (undated) DEVICE — APPLICATOR, CHLORAPREP, W/ORANGE TINT, 26ML

## (undated) DEVICE — SUTURE, VICRYL, 3-0, 27 IN, SH, VIOLET

## (undated) DEVICE — SUTURE, STRATAFIX, 3-0 MONOCRYL PLUS, PS-2 SPIRAL UNDYED

## (undated) DEVICE — TUBING, SUCTION, 6MM X 10, CLEAN N-COND

## (undated) DEVICE — GOWN, SURGICAL, SIRUS, NON REINFORCED, LARGE

## (undated) DEVICE — DRESSING, GAUZE, SPONGE, VERSALON, 4 PLY, 2 X 2 IN, STERILE

## (undated) DEVICE — AXONICS REMOTE CONTROL

## (undated) DEVICE — PROBE, APOLLO RF, 90 DEG, EXTRA LARTGE

## (undated) DEVICE — SUTURE, VICRYL, 2-0, 27 IN, BR/SH 27, VIOLET

## (undated) DEVICE — COVER, C-ARM W/CLIPS, OEC GE

## (undated) DEVICE — EXCAL 4MM X 13CM SINGLE

## (undated) DEVICE — CHARGING SYSTEM

## (undated) DEVICE — GLOVE, SURGICAL, PROTEXIS PI ORTHO, 7.5, PF, LF

## (undated) DEVICE — STRIP, SKIN CLOSURE, STERI STRIP, REINFORCED, 0.5 X 4 IN

## (undated) DEVICE — NEEDLE, HYPODERMIC, MONOJECT, 22 G X 1.5 IN, BLUE, RIGID PACK

## (undated) DEVICE — KIT, STABILIZATION SHOULDER

## (undated) DEVICE — KIT, LEAD IMPLANT

## (undated) DEVICE — TUBING, PATIENT 8FT STERILE